# Patient Record
Sex: MALE | Race: WHITE
[De-identification: names, ages, dates, MRNs, and addresses within clinical notes are randomized per-mention and may not be internally consistent; named-entity substitution may affect disease eponyms.]

---

## 2017-01-12 ENCOUNTER — HOSPITAL ENCOUNTER (OUTPATIENT)
Dept: HOSPITAL 45 - C.LABSPEC | Age: 82
Discharge: HOME | End: 2017-01-12
Attending: INTERNAL MEDICINE
Payer: COMMERCIAL

## 2017-01-12 DIAGNOSIS — I48.91: Primary | ICD-10-CM

## 2017-01-12 DIAGNOSIS — Z91.81: ICD-10-CM

## 2017-01-12 DIAGNOSIS — Z79.01: ICD-10-CM

## 2017-01-12 DIAGNOSIS — M62.81: ICD-10-CM

## 2017-01-12 DIAGNOSIS — Z51.81: ICD-10-CM

## 2017-01-12 LAB
ANION GAP SERPL CALC-SCNC: 8 MMOL/L (ref 3–11)
BUN SERPL-MCNC: 25 MG/DL (ref 7–18)
BUN/CREAT SERPL: 16.8 (ref 10–20)
CALCIUM SERPL-MCNC: 9.4 MG/DL (ref 8.5–10.1)
CHLORIDE SERPL-SCNC: 104 MMOL/L (ref 98–107)
CO2 SERPL-SCNC: 28 MMOL/L (ref 21–32)
CREAT SERPL-MCNC: 1.5 MG/DL (ref 0.6–1.4)
GLUCOSE SERPL-MCNC: 100 MG/DL (ref 70–99)
POTASSIUM SERPL-SCNC: 4.6 MMOL/L (ref 3.5–5.1)
SODIUM SERPL-SCNC: 140 MMOL/L (ref 136–145)

## 2017-09-30 ENCOUNTER — HOSPITAL ENCOUNTER (INPATIENT)
Dept: HOSPITAL 45 - C.EDC | Age: 82
LOS: 6 days | Discharge: NURSING, REHAB OR LONG-TERM CARE | DRG: 494 | End: 2017-10-06
Attending: HOSPITALIST | Admitting: FAMILY MEDICINE
Payer: COMMERCIAL

## 2017-09-30 VITALS — OXYGEN SATURATION: 99 %

## 2017-09-30 VITALS
SYSTOLIC BLOOD PRESSURE: 112 MMHG | DIASTOLIC BLOOD PRESSURE: 59 MMHG | HEART RATE: 58 BPM | OXYGEN SATURATION: 94 % | TEMPERATURE: 97.7 F

## 2017-09-30 VITALS
HEIGHT: 71 IN | BODY MASS INDEX: 28.92 KG/M2 | WEIGHT: 206.57 LBS | BODY MASS INDEX: 28.92 KG/M2 | WEIGHT: 206.57 LBS | HEIGHT: 71 IN

## 2017-09-30 VITALS
HEART RATE: 59 BPM | DIASTOLIC BLOOD PRESSURE: 74 MMHG | OXYGEN SATURATION: 96 % | TEMPERATURE: 97.7 F | SYSTOLIC BLOOD PRESSURE: 124 MMHG

## 2017-09-30 DIAGNOSIS — Z79.899: ICD-10-CM

## 2017-09-30 DIAGNOSIS — Z79.82: ICD-10-CM

## 2017-09-30 DIAGNOSIS — I12.9: ICD-10-CM

## 2017-09-30 DIAGNOSIS — S93.04XA: ICD-10-CM
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
## 2017-09-30 DIAGNOSIS — T38.0X5A: ICD-10-CM

## 2017-09-30 DIAGNOSIS — I48.0: ICD-10-CM

## 2017-09-30 DIAGNOSIS — N18.3: ICD-10-CM

## 2017-09-30 DIAGNOSIS — E03.9: ICD-10-CM

## 2017-09-30 DIAGNOSIS — G25.0: ICD-10-CM

## 2017-09-30 DIAGNOSIS — Z79.01: ICD-10-CM

## 2017-09-30 DIAGNOSIS — S82.841A: Primary | ICD-10-CM

## 2017-09-30 DIAGNOSIS — S93.402A: ICD-10-CM

## 2017-09-30 DIAGNOSIS — T45.515A: ICD-10-CM

## 2017-09-30 DIAGNOSIS — D72.829: ICD-10-CM

## 2017-09-30 DIAGNOSIS — E78.5: ICD-10-CM

## 2017-09-30 DIAGNOSIS — R79.1: ICD-10-CM

## 2017-09-30 DIAGNOSIS — K21.9: ICD-10-CM

## 2017-09-30 DIAGNOSIS — R29.6: ICD-10-CM

## 2017-09-30 LAB
ALBUMIN/GLOB SERPL: 1 {RATIO} (ref 0.9–2)
ALP SERPL-CCNC: 83 U/L (ref 45–117)
ALT SERPL-CCNC: 27 U/L (ref 12–78)
ANION GAP SERPL CALC-SCNC: 8 MMOL/L (ref 3–11)
AST SERPL-CCNC: 18 U/L (ref 15–37)
BASOPHILS # BLD: 0 K/UL (ref 0–0.2)
BASOPHILS NFR BLD: 0 %
BUN SERPL-MCNC: 53 MG/DL (ref 7–18)
BUN/CREAT SERPL: 28.1 (ref 10–20)
CALCIUM SERPL-MCNC: 8.5 MG/DL (ref 8.5–10.1)
CHLORIDE SERPL-SCNC: 98 MMOL/L (ref 98–107)
CO2 SERPL-SCNC: 29 MMOL/L (ref 21–32)
COMPLETE: YES
CREAT CL PREDICTED SERPL C-G-VRATE: 32.6 ML/MIN
CREAT SERPL-MCNC: 1.9 MG/DL (ref 0.6–1.4)
EOSINOPHIL NFR BLD AUTO: 278 K/UL (ref 130–400)
GLOBULIN SER-MCNC: 3.2 GM/DL (ref 2.5–4)
GLUCOSE SERPL-MCNC: 115 MG/DL (ref 70–99)
HCT VFR BLD CALC: 42.7 % (ref 42–52)
IG%: 0.5 %
IMM GRANULOCYTES NFR BLD AUTO: 8.6 %
INR PPP: 4.4 (ref 0.9–1.1)
LYMPHOCYTES # BLD: 1.43 K/UL (ref 1.2–3.4)
MCH RBC QN AUTO: 28.1 PG (ref 25–34)
MCHC RBC AUTO-ENTMCNC: 31.4 G/DL (ref 32–36)
MCV RBC AUTO: 89.5 FL (ref 80–100)
MONOCYTES NFR BLD: 5.9 %
NEUTROPHILS # BLD AUTO: 0.4 %
NEUTROPHILS NFR BLD AUTO: 84.6 %
PMV BLD AUTO: 10.4 FL (ref 7.4–10.4)
POTASSIUM SERPL-SCNC: 3.6 MMOL/L (ref 3.5–5.1)
PROTHROMBIN TIME: 50.3 SECONDS (ref 9–12)
RBC # BLD AUTO: 4.77 M/UL (ref 4.7–6.1)
SODIUM SERPL-SCNC: 135 MMOL/L (ref 136–145)
WBC # BLD AUTO: 16.64 K/UL (ref 4.8–10.8)

## 2017-09-30 PROCEDURE — 0QSG04Z REPOSITION RIGHT TIBIA WITH INTERNAL FIXATION DEVICE, OPEN APPROACH: ICD-10-PCS | Performed by: ORTHOPAEDIC SURGERY

## 2017-09-30 PROCEDURE — 0SSFXZZ REPOSITION RIGHT ANKLE JOINT, EXTERNAL APPROACH: ICD-10-PCS

## 2017-09-30 PROCEDURE — 0QSJ04Z REPOSITION RIGHT FIBULA WITH INTERNAL FIXATION DEVICE, OPEN APPROACH: ICD-10-PCS | Performed by: ORTHOPAEDIC SURGERY

## 2017-09-30 RX ADMIN — TAMSULOSIN HYDROCHLORIDE SCH MG: 0.4 CAPSULE ORAL at 20:51

## 2017-09-30 RX ADMIN — TRAMADOL HYDROCHLORIDE PRN MG: 50 TABLET, COATED ORAL at 17:18

## 2017-09-30 NOTE — DIAGNOSTIC IMAGING REPORT
R ANKLE MIN 3 VIEWS ROUTINE



CLINICAL HISTORY: 86 years-old Male presenting with trauma. 



TECHNIQUE: Frontal, mortise, and lateral views of the right ankle were obtained.





COMPARISON:  None.



FINDINGS:

Diffuse soft tissue swelling noted around the ankle. Obliquely oriented fracture

of the fibula at the level of the syndesmosis which courses from the posterior

cortex proximally to the anterior cortex distally. Widening of the medial clear

space, which measures 6 to 7 mm. Widening of the anterior ankle joint, which

measures 16 mm. No gross evidence of a posterior malleolus fracture, although

presumed disruption of posterior ligaments.



IMPRESSION:

Brunner B fracture pattern with presumed disruption of posterior and medial

ligamentous structures. No fracture of the posterior malleolus or medial

malleolus.







Electronically signed by:  Santo Ochoa M.D.

9/30/2017 12:37 PM



Dictated Date/Time:  9/30/2017 12:34 PM

## 2017-09-30 NOTE — DIAGNOSTIC IMAGING REPORT
CERVICAL SPINE W/O



CLINICAL HISTORY: 86 years-old Male presenting with trauma, fell getting out of

bed. 



TECHNIQUE: Multidetector CT of the cervical spine was performed without the use

of intravenous contrast. IV contrast: None. A dose lowering technique was used

consistent with the principles of ALARA (as low as reasonably achievable).



COMPARISON: Plain radiographs of the cervical spine from 2016.



CT DOSE (mGy.cm): The estimated cumulative dose is 1221.96.



FINDINGS:



 topogram: Unremarkable.



Normal cervical lordosis. Mild vertebral body height loss noted at C4 and C5.

Significant intervertebral disc height loss at C5-6 and C6-7. Multilevel

degenerative changes with disc osteophyte complexes from C4-5 through C6-7.

Facet arthropathy also noted at multiple levels. No acute fracture or

malalignment. Skull base intact. Osseous neural foraminal narrowing noted at

C4-5 and to a lesser degree at C5-6 bilaterally. No osseous spinal canal

narrowing. Paraspinal soft tissues demonstrate the stimulator leads and

atherosclerosis but are otherwise normal. Lung apices with mild interlobular

septal prominence but essentially clear. Old calcified granuloma noted at the

right apex.



IMPRESSION:

1.  No acute osseous injury of the cervical spine.



2.  Multilevel degenerative changes as above. 







Electronically signed by:  Santo Ochoa M.D.

9/30/2017 12:32 PM



Dictated Date/Time:  9/30/2017 12:23 PM

## 2017-09-30 NOTE — DIAGNOSTIC IMAGING REPORT
PELVIS 1 OR 2 VIEW ROUTINE



CLINICAL HISTORY: 86 years-old Male presenting with trauma, distracting injury. 



TECHNIQUE: Single frontal view of the pelvis was obtained. 



COMPARISON:  None.



FINDINGS:

Bony pelvis intact. Pubic symphysis and sacroiliac joints congruent. Hip joints

congruent. No gross evidence of femoral neck fracture. Unremarkable lower lumbar

spine. Nonobstructive bowel gas pattern.



IMPRESSION:

No acute osseous injury of the pelvis.







Electronically signed by:  Santo Ochoa M.D.

9/30/2017 12:39 PM



Dictated Date/Time:  9/30/2017 12:37 PM

## 2017-09-30 NOTE — DIAGNOSTIC IMAGING REPORT
CHEST ONE VIEW PORTABLE



CLINICAL HISTORY: 86 years-old Male presenting with trauma. 



TECHNIQUE: Portable upright AP view of the chest was obtained.



COMPARISON:  12/17/2016.



FINDINGS:

Bilateral implanted devices with stimulator leads coursing to the neck and

cranium. Atherosclerosis of aortic arch. Cardiac silhouette top normal in size.

Persistent elevation of the right hemidiaphragm. Minimal bibasilar opacities

similar to prior exam. No large effusion or pneumothorax. No new focal

infiltrate. Degenerative changes of the thoracic spine. Upper abdomen normal.



IMPRESSION:

1.  Bibasilar opacities likely atelectasis or scarring. This is unchanged prior.

No new focal infiltrate to suggest acute cardiopulmonary disease.







Electronically signed by:  Santo Ochoa M.D.

9/30/2017 12:34 PM



Dictated Date/Time:  9/30/2017 12:32 PM

## 2017-09-30 NOTE — PROGRESS NOTE
Progress Note


Date of Service


Sep 30, 2017.





Progress Note


Spoke with Dr. Nice, orthopedic surgery; recommendation is made for 

surgery on the R ankle in the AM (9/30). Only concern is the INR is 4.4. I 

spoke with Dr. Miller, who recommends giving IV Vitamin K 10 - and recheck INR 

in AM. No other contraindications for surgery. Last echo noted in December 2016

; currently in NSR.

## 2017-09-30 NOTE — HISTORY AND PHYSICAL
History & Physical


Date & Time of Service:


Sep 30, 2017 at 14:06


Chief Complaint:


Fall/R-Ankle Pain


Primary Care Physician:


Dilan Rucker MD


History of Present Illness


Source:  patient, family, clinic records, hospital records


This is an 86 year old male with a PMH of paroxysmal atrial fibrillation on 

Coumadin, HTN, hypothyroidism, CKD stage 3, essential tremor, with a recent 

injury of the L fibula; L lateral malleolar area - he was at home with non-

weight bearing status; using prednisone for this. This morning, patient states 

he was trying to get out of the bed and he fell. He states his R ankle was very 

painful and swollen. He presented to he ER and found to have fracture and 

dislocation of the R ankle. This was reduced and repeat X-ray suggests a 

posterior malleolar fracture. He states that the pain is much improved after 

morphine and fentanyl. He states he feels better now. Denies chest pain/

shortness of breath. Denies fevers/chills.





States he had some diarrhea with the Keflex that he was prescribed as an 

outpatient.





Past Medical/Surgical History


Medical Problems:


(1) Carotid artery surgery


Status: Resolved  





(2) Gastroesophageal reflux disease


Status: Chronic  





(3) Hyperlipidemia


Status: Chronic  





(4) Hypothyroidism


Status: Chronic  





(5) Placement of deep brain stimulators


Status: Resolved  











Social History


Smoking Status:  Never Smoker


Marital Status:  


Housing status:  lives with family


Occupational Status:  retired





Immunizations


History of Influenza Vaccine:  Yes


History of Tetanus Vaccine?:  No


History of Pneumococcal:  Yes


History of Hepatitis B Vaccine:  No





Multi-Drug Resistant Organisms


History of MDRO:  No





Allergies


Coded Allergies:  


     Finasteride (Unverified  Adverse Reaction, Severe, RASH, 9/30/17)





Home Medications


Scheduled


Amiodarone Hcl (Cordarone), 200 MG PO DAILY


Aspirin (Aspirin EC Low Dose), 81 MG PO DAILY


Atorvastatin (Lipitor), 10 MG PO DAILY


Cephalexin Monohydrate (Keflex), 500 MG PO TID


Levothyroxine Sodium (Synthroid), 75 MCG PO DAILY


Omeprazole (Prilosec), 40 MG PO DAILYBB


Prednisone (Prednisone), 10 MG PO DAILY


Probiotic Product (Probiotic), 1 CAP PO DAILY


Tamsulosin Hcl (Flomax), 0.4 MG PO HS


Warfarin Sod (Jantoven), 4.5 MG PO UD





Scheduled PRN


Polyethylene (Miralax), 17 GM PO DAILY PRN for Constipation


Tramadol (Ultram), 25 MG PO Q6H PRN for Pain





Review of Systems


Constitutional:  No fever, No chills


ENT:  No hearing loss


Respiratory:  No cough, No sputum, No wheezing, No shortness of breath, No 

dyspnea on exertion, No dyspnea at rest, No hemoptysis


Cardiovascular:  No chest pain, No palpitations


Abdomen:  + diarrhea, No pain, No nausea, No vomiting, No constipation, No GI 

bleeding


Musculoskeletal:  + joint pain (R ankle), + swelling, No muscle pain, No calf 

pain


Genitourinary - Male:  No hematuria, No dysuria


Neurologic:  + balance problems, No memory loss, No paralysis, No weakness


Psychiatric:  No depression symptoms


Hematologic / Lymphatic:  No abnormal bleeding/bruising


Integumentary:  No rash


Allergic / Immunologic:  No environmental allergies, No seasonal allergies





Physical Exam


Vital Signs











  Date Time  Temp Pulse Resp B/P (MAP) Pulse Ox O2 Delivery O2 Flow Rate FiO2


 


9/30/17 13:00  53 14 127/81 99 Room Air  


 


9/30/17 12:22  53      


 


9/30/17 11:57  50  106/56 93 Room Air  


 


9/30/17 11:09 36.4 92 17 106/62 98 Room Air  








General Appearance:  no apparent distress, + pertinent finding (+resting tremor)


Head:  normocephalic, atraumatic


Eyes:  normal inspection


ENT:  hearing grossly normal


Respiratory/Chest:  chest non-tender, lungs clear, normal breath sounds, no 

respiratory distress, no accessory muscle use


Cardiovascular:  regular rate, rhythm, no edema, no gallop, no JVD, no murmur, 

normal peripheral pulses


Abdomen/GI:  normal bowel sounds, non tender, soft


Back:  no muscle spasm


Extremities/Musculoskelatal:  + pertinent finding (R ankle is wrapped currently

, painful to any movement; L ankle is doing better, but painful with ROM)


Neurologic/Psych:  CNs II-XII nml as tested, no motor/sensory deficits, alert, 

normal mood/affect, oriented x 3, + pertinent finding (+tremor)


Skin:  normal color


Lymphatic:  no adenopathy





Diagnostics


Laboratory Results





Results Past 24 Hours








Test


  9/30/17


11:34 Range/Units


 


 


White Blood Count 16.64 4.8-10.8  K/uL


 


Red Blood Count 4.77 4.7-6.1  M/uL


 


Hemoglobin 13.4 14.0-18.0  g/dL


 


Hematocrit 42.7 42-52  %


 


Mean Corpuscular Volume 89.5   fL


 


Mean Corpuscular Hemoglobin 28.1 25-34  pg


 


Mean Corpuscular Hemoglobin


Concent 31.4


  32-36  g/dl


 


 


Platelet Count 278 130-400  K/uL


 


Mean Platelet Volume 10.4 7.4-10.4  fL


 


Neutrophils (%) (Auto) 84.6  %


 


Lymphocytes (%) (Auto) 8.6  %


 


Monocytes (%) (Auto) 5.9  %


 


Eosinophils (%) (Auto) 0.4  %


 


Basophils (%) (Auto) 0.0  %


 


Neutrophils # (Auto) 14.07 1.4-6.5  K/uL


 


Lymphocytes # (Auto) 1.43 1.2-3.4  K/uL


 


Monocytes # (Auto) 0.99 0.11-0.59  K/uL


 


Eosinophils # (Auto) 0.06 0-0.5  K/uL


 


Basophils # (Auto) 0.00 0-0.2  K/uL


 


RDW Standard Deviation 53.1 36.4-46.3  fL


 


RDW Coefficient of Variation 16.2 11.5-14.5  %


 


Immature Granulocyte % (Auto) 0.5  %


 


Immature Granulocyte # (Auto) 0.09 0.00-0.02  K/uL


 


Prothrombin Time


  50.3


  9.0-12.0


SECONDS


 


Prothromb Time International


Ratio 4.4


  0.9-1.1  


 


 


Sodium Level 135 136-145  mmol/L


 


Potassium Level 3.6 3.5-5.1  mmol/L


 


Chloride Level 98   mmol/L


 


Carbon Dioxide Level 29 21-32  mmol/L


 


Anion Gap 8.0 3-11  mmol/L


 


Blood Urea Nitrogen 53 7-18  mg/dl


 


Creatinine


  1.90


  0.60-1.40


mg/dl


 


Est Creatinine Clear Calc


Drug Dose 32.6


   ml/min


 


 


Estimated GFR (


American) 36.2


   


 


 


Estimated GFR (Non-


American 31.2


   


 


 


BUN/Creatinine Ratio 28.1 10-20  


 


Random Glucose 115 70-99  mg/dl


 


Calcium Level 8.5 8.5-10.1  mg/dl


 


Total Bilirubin 0.7 0.2-1  mg/dl


 


Aspartate Amino Transf


(AST/SGOT) 18


  15-37  U/L


 


 


Alanine Aminotransferase


(ALT/SGPT) 27


  12-78  U/L


 


 


Alkaline Phosphatase 83   U/L


 


Total Protein 6.4 6.4-8.2  gm/dl


 


Albumin 3.1 3.4-5.0  gm/dl


 


Globulin 3.2 2.5-4.0  gm/dl


 


Albumin/Globulin Ratio 1.0 0.9-2  











Diagnostic Radiology


R ANKLE MIN 3 VIEWS ROUTINE





CLINICAL HISTORY: 86 years-old Male presenting with post reduction. 





TECHNIQUE: Frontal, oblique, and lateral views of the right ankle were obtained.








COMPARISON:  Plain radiographs of the right ankle performed earlier the same


day.





FINDINGS:


There has been interval reduction of the of the ankle mortise. The medial clear


space now measures 5 mm, decreased from 7 mm. No significant diastases of the


anterior aspect of the ankle mortise. Again demonstrated is the obliquely


oriented fracture of the fibula at the level of the syndesmosis. There is a


cortical discontinuity of the posterior aspect of the tibial plafond suggesting


posterior malleolar fracture. An overlying fiberglass splint partially tears


underlying osseous detail.





IMPRESSION:


Suggestion of posterior malleolus fracture, which is nondisplaced. Interval


reduction of the ankle mortise dislocation. Unchanged Brunner B fracture of the


fibula.








HEAD WITHOUT CONTRAST (CT)





CLINICAL HISTORY: 86 years-old Male presenting with trauma, fell when getting


out of of bed. 





TECHNIQUE: Multidetector CT imaging of the head was performed without the use of


intravenous contrast. IV contrast: None. A dose lowering technique was used


consistent with the principles of ALARA (as low as reasonably achievable). 





COMPARISON:  4/27/2009.





CT DOSE (mGy.cm): The estimated cumulative dose is 1221.96 mGy.cm.





FINDINGS: 





 topogram: 2 stimulator leads noted in the cranium.





Proportional ventricular and sulcal prominence, likely age-related parenchymal


volume loss. Prominence of CSF space along the left posterior fossa may indicate


an arachnoid cyst. Periventricular and subcortical white matter hypoattenuation,


nonspecific but likely indicative of chronic small vessel ischemic change. 2


transfrontal deep brain stimulator leads terminate in the thalami. No mass


effect or midline shift. No hemorrhage or acute territorial infarct. No


extra-axial fluid collection. Paranasal sinuses and mastoid air cells clear.


Calvarium intact other than the rupa holes for the stimulator leads.    





IMPRESSION:


1.  No acute intracranial pathology. 





2.  Postoperative changes stable from prior exam.





PELVIS 1 OR 2 VIEW ROUTINE





CLINICAL HISTORY: 86 years-old Male presenting with trauma, distracting injury. 





TECHNIQUE: Single frontal view of the pelvis was obtained. 





COMPARISON:  None.





FINDINGS:


Bony pelvis intact. Pubic symphysis and sacroiliac joints congruent. Hip joints


congruent. No gross evidence of femoral neck fracture. Unremarkable lower lumbar


spine. Nonobstructive bowel gas pattern.





IMPRESSION:


No acute osseous injury of the pelvis.





CERVICAL SPINE W/O





CLINICAL HISTORY: 86 years-old Male presenting with trauma, fell getting out of


bed. 





TECHNIQUE: Multidetector CT of the cervical spine was performed without the use


of intravenous contrast. IV contrast: None. A dose lowering technique was used


consistent with the principles of ALARA (as low as reasonably achievable).





COMPARISON: Plain radiographs of the cervical spine from 2016.





CT DOSE (mGy.cm): The estimated cumulative dose is 1221.96.





FINDINGS:





 topogram: Unremarkable.





Normal cervical lordosis. Mild vertebral body height loss noted at C4 and C5.


Significant intervertebral disc height loss at C5-6 and C6-7. Multilevel


degenerative changes with disc osteophyte complexes from C4-5 through C6-7.


Facet arthropathy also noted at multiple levels. No acute fracture or


malalignment. Skull base intact. Osseous neural foraminal narrowing noted at


C4-5 and to a lesser degree at C5-6 bilaterally. No osseous spinal canal


narrowing. Paraspinal soft tissues demonstrate the stimulator leads and


atherosclerosis but are otherwise normal. Lung apices with mild interlobular


septal prominence but essentially clear. Old calcified granuloma noted at the


right apex.





IMPRESSION:


1.  No acute osseous injury of the cervical spine.





2.  Multilevel degenerative changes as above.





EKG


Sinus Bradycardia at 58 bpm





Impression


Assessment and Plan


This is an 86 year old male with a PMH of paroxysmal atrial fibrillation on 

Coumadin, HTN, hypothyroidism, CKD stage 3, essential tremor, with mechanical 

fall and R posterior malleolar fracture





R Posterior Malleolar Fracture


L Ankle Pain


patient presented status post mechanical fall


fracture R posterior malleolar fracture


this was also dislocated and subsequently reduced by the ER physician


orthopedic consultation placed


currently on bedrest


morphine and tramadol PRN





Paroxysmal Atrial Fibrillation


currently in NSR


continue Amiodarone


hold Coumadin, due to elevated INR





Leukocytosis


secondary to prednisone use


hold steroids, and hold abx. - no sign of infection currently





Hypothyroidism


continue Synthroid





CKD stage 3


avoid nephrotoxic agents when able


gentle hydration





DVT ppx


Coumadin





FULL CODE





VTE Prophylaxis


VTE Risk Assessment Done? Y/N:  Yes


Risk Level:  High


Given or contraindicated:  Warfarin (Coumadin)

## 2017-09-30 NOTE — DIAGNOSTIC IMAGING REPORT
HEAD WITHOUT CONTRAST (CT)



CLINICAL HISTORY: 86 years-old Male presenting with trauma, fell when getting

out of of bed. 



TECHNIQUE: Multidetector CT imaging of the head was performed without the use of

intravenous contrast. IV contrast: None. A dose lowering technique was used

consistent with the principles of ALARA (as low as reasonably achievable). 



COMPARISON:  4/27/2009.



CT DOSE (mGy.cm): The estimated cumulative dose is 1221.96 mGy.cm.



FINDINGS: 



 topogram: 2 stimulator leads noted in the cranium.



Proportional ventricular and sulcal prominence, likely age-related parenchymal

volume loss. Prominence of CSF space along the left posterior fossa may indicate

an arachnoid cyst. Periventricular and subcortical white matter hypoattenuation,

nonspecific but likely indicative of chronic small vessel ischemic change. 2

transfrontal deep brain stimulator leads terminate in the thalami. No mass

effect or midline shift. No hemorrhage or acute territorial infarct. No

extra-axial fluid collection. Paranasal sinuses and mastoid air cells clear.

Calvarium intact other than the rupa holes for the stimulator leads.    



IMPRESSION:

1.  No acute intracranial pathology. 



2.  Postoperative changes stable from prior exam.







Electronically signed by:  Santo Ochoa M.D.

9/30/2017 12:19 PM



Dictated Date/Time:  9/30/2017 12:16 PM

## 2017-09-30 NOTE — EMERGENCY ROOM VISIT NOTE
History


Report prepared by Lynn:  Violte Sommers


Under the Supervision of:  ANTHONY GriffithO.


First contact with patient:  11:04


Stated Complaint:  FALL/R-ANKLE PAIN





History of Present Illness


The patient is a 86 year old male who presents to the Emergency Room with 

complaints of constant right ankle pain beginning just PTA. The patient states 

that he got up this morning to use the urinal and fell. He reports that he hit 

his head and hurt his right ankle. He notes that he is not sure how he fell and 

states that he is on Coumadin. The patient denies any LOC, shortness of breath, 

nausea, back pain, abdominal pain, shoulder pain, and hip pain. He notes that 

he had an episode of a fall last week and has been in a wheelchair since then 

with left leg pain.





   Source of History:  patient


   Onset:  just PTA


   Position:  ankle (right)


   Timing:  constant


   Associated Symptoms:  No LOC, No SOB, No nausea, No abdominal pain, No back 

pain


Note:


Pt complains of head injury. The patient denies any shoulder pain, and hip pain.





Review of Systems


See HPI for pertinent positives & negatives. A total of 10 systems reviewed and 

were otherwise negative.





Past Medical & Surgical


Medical Problems:


(1) Atrial fibrillation with RVR


(2) Carotid artery surgery


(3) Gastroesophageal reflux disease


(4) Hyperlipidemia


(5) Hypotension


(6) Hypothyroidism


(7) Placement of deep brain stimulators








Family History


No pertinent family history stated.





Social History


Smoking Status:  Never Smoker


Alcohol Use:  none


Marital Status:  


Housing Status:  lives with family


Occupation Status:  retired





Current/Historical Medications


Scheduled


Amiodarone Hcl (Cordarone), 200 MG PO DAILY


Aspirin (Aspirin EC Low Dose), 81 MG PO DAILY


Atorvastatin (Lipitor), 10 MG PO DAILY


Cephalexin Monohydrate (Keflex), 500 MG PO TID


Levothyroxine Sodium (Synthroid), 75 MCG PO DAILY


Omeprazole (Prilosec), 40 MG PO DAILYBB


Prednisone (Prednisone), 10 MG PO DAILY


Probiotic Product (Probiotic), 1 CAP PO DAILY


Tamsulosin Hcl (Flomax), 0.4 MG PO HS


Warfarin Sod (Jantoven), 4.5 MG PO UD





Scheduled PRN


Polyethylene (Miralax), 17 GM PO DAILY PRN for Constipation


Tramadol (Ultram), 25 MG PO Q6H PRN for Pain





Allergies


Coded Allergies:  


     Finasteride (Unverified  Adverse Reaction, Severe, RASH, 9/30/17)





Physical Exam


Vital Signs











  Date Time  Temp Pulse Resp B/P (MAP) Pulse Ox O2 Delivery O2 Flow Rate FiO2


 


9/30/17 13:00  53 14 127/81 99 Room Air  


 


9/30/17 12:22  53      


 


9/30/17 11:57  50  106/56 93 Room Air  


 


9/30/17 11:09 36.4 92 17 106/62 98 Room Air  











Physical Exam


GENERAL: alert, well appearing, well nourished, no distress, non-toxic 


EYE EXAM: normal conjunctiva, PERRL and EOM's grossly intact


OROPHARYNX: no exudate, no erythema, lips, buccal mucosa, and tongue normal and 

mucous membranes are moist


NECK: supple, no nuchal rigidity, no adenopathy, non-tender


LUNGS: Clear to auscultation. Normal chest wall mechanics


CHEST: No chest wall tenderness. 


HEART: no murmurs, S1 normal and S2 normal 


ABDOMEN: abdomen soft, non-tender, normo-active bowel sounds, no masses, no 

rebound or guarding. 


BACK: Back is symmetrical on inspection and there is no deformity, no midline 

tenderness, no CVA tenderness. 


SKIN: no rashes and no bruising 


UPPER EXTREMITIES: upper extremities are grossly normal. 


LOWER EXTREMITIES: Obvious deformity and swelling to the right ankle. Normal 

pulses, normal sensorium, decreased range of motion of the right foot secondary 

to pain. No other pain to palpation to hips, arms, knees.


NEURO EXAM: Normal sensorium, cranial nerves II-XII grossly intact, normal 

speech,  no gross weakness of arms, no gross weakness of legs.





Medical Decision & Procedures


ER Provider


Diagnostic Interpretation:


Radiology results have been interpreted by the radiologist and reviewed by me.





PELVIS 1 OR 2 VIEW ROUTINE





FINDINGS:


Bony pelvis intact. Pubic symphysis and sacroiliac joints congruent. Hip joints


congruent. No gross evidence of femoral neck fracture. Unremarkable lower lumbar


spine. Nonobstructive bowel gas pattern.





IMPRESSION:


No acute osseous injury of the pelvis.





Electronically signed by:  Santo Ochoa M.D.


9/30/2017 12:39 PM





Dictated Date/Time:  9/30/2017 12:37 PM





HEAD WITHOUT CONTRAST (CT)





FINDINGS: 





 topogram: 2 stimulator leads noted in the cranium.





Proportional ventricular and sulcal prominence, likely age-related parenchymal


volume loss. Prominence of CSF space along the left posterior fossa may indicate


an arachnoid cyst. Periventricular and subcortical white matter hypoattenuation,


nonspecific but likely indicative of chronic small vessel ischemic change. 2


transfrontal deep brain stimulator leads terminate in the thalami. No mass


effect or midline shift. No hemorrhage or acute territorial infarct. No


extra-axial fluid collection. Paranasal sinuses and mastoid air cells clear.


Calvarium intact other than the rupa holes for the stimulator leads.    





IMPRESSION:


1.  No acute intracranial pathology. 





2.  Postoperative changes stable from prior exam.





Electronically signed by:  Santo Ochoa M.D.


9/30/2017 12:19 PM





Dictated Date/Time:  9/30/2017 12:16 PM





CHEST ONE VIEW PORTABLE





FINDINGS:


Bilateral implanted devices with stimulator leads coursing to the neck and


cranium. Atherosclerosis of aortic arch. Cardiac silhouette top normal in size.


Persistent elevation of the right hemidiaphragm. Minimal bibasilar opacities


similar to prior exam. No large effusion or pneumothorax. No new focal


infiltrate. Degenerative changes of the thoracic spine. Upper abdomen normal.





IMPRESSION:


1.  Bibasilar opacities likely atelectasis or scarring. This is unchanged prior.


No new focal infiltrate to suggest acute cardiopulmonary disease.





Electronically signed by:  Santo Ochoa M.D.


9/30/2017 12:34 PM





Dictated Date/Time:  9/30/2017 12:32 PM





CERVICAL SPINE W/O





FINDINGS:





 topogram: Unremarkable.





Normal cervical lordosis. Mild vertebral body height loss noted at C4 and C5.


Significant intervertebral disc height loss at C5-6 and C6-7. Multilevel


degenerative changes with disc osteophyte complexes from C4-5 through C6-7.


Facet arthropathy also noted at multiple levels. No acute fracture or


malalignment. Skull base intact. Osseous neural foraminal narrowing noted at


C4-5 and to a lesser degree at C5-6 bilaterally. No osseous spinal canal


narrowing. Paraspinal soft tissues demonstrate the stimulator leads and


atherosclerosis but are otherwise normal. Lung apices with mild interlobular


septal prominence but essentially clear. Old calcified granuloma noted at the


right apex.





IMPRESSION:


1.  No acute osseous injury of the cervical spine.





2.  Multilevel degenerative changes as above. 





Electronically signed by:  Santo Ochoa M.D.


9/30/2017 12:32 PM





Dictated Date/Time:  9/30/2017 12:23 PM





R ANKLE MIN 3 VIEWS ROUTINE





COMPARISON:  None.





FINDINGS:


Diffuse soft tissue swelling noted around the ankle. Obliquely oriented fracture


of the fibula at the level of the syndesmosis which courses from the posterior


cortex proximally to the anterior cortex distally. Widening of the medial clear


space, which measures 6 to 7 mm. Widening of the anterior ankle joint, which


measures 16 mm. No gross evidence of a posterior malleolus fracture, although


presumed disruption of posterior ligaments.





IMPRESSION:


Brunner B fracture pattern with presumed disruption of posterior and medial


ligamentous structures. No fracture of the posterior malleolus or medial


malleolus.





Electronically signed by:  Santo Ochoa M.D.


9/30/2017 12:37 PM





Dictated Date/Time:  9/30/2017 12:34 PM





Laboratory Results











Test


  9/30/17


11:34


 


Immature Granulocyte % (Auto) 0.5 % 


 


White Blood Count


  16.64 K/uL


(4.8-10.8)


 


Red Blood Count


  4.77 M/uL


(4.7-6.1)


 


Hemoglobin


  13.4 g/dL


(14.0-18.0)


 


Hematocrit 42.7 % (42-52) 


 


Mean Corpuscular Volume


  89.5 fL


()


 


Mean Corpuscular Hemoglobin


  28.1 pg


(25-34)


 


Mean Corpuscular Hemoglobin


Concent 31.4 g/dl


(32-36)


 


Platelet Count


  278 K/uL


(130-400)


 


Mean Platelet Volume


  10.4 fL


(7.4-10.4)


 


Neutrophils (%) (Auto) 84.6 % 


 


Lymphocytes (%) (Auto) 8.6 % 


 


Monocytes (%) (Auto) 5.9 % 


 


Eosinophils (%) (Auto) 0.4 % 


 


Basophils (%) (Auto) 0.0 % 


 


Neutrophils # (Auto)


  14.07 K/uL


(1.4-6.5)


 


Lymphocytes # (Auto)


  1.43 K/uL


(1.2-3.4)


 


Monocytes # (Auto)


  0.99 K/uL


(0.11-0.59)


 


Eosinophils # (Auto)


  0.06 K/uL


(0-0.5)


 


Basophils # (Auto)


  0.00 K/uL


(0-0.2)


 


Immature Granulocyte # (Auto)


  0.09 K/uL


(0.00-0.02)


 


Total Bilirubin


  0.7 mg/dl


(0.2-1)


 


Aspartate Amino Transf


(AST/SGOT) 18 U/L (15-37) 


 


 


Alanine Aminotransferase


(ALT/SGPT) 27 U/L (12-78) 


 


 


Alkaline Phosphatase


  83 U/L


()


 


Total Protein


  6.4 gm/dl


(6.4-8.2)


 


Albumin


  3.1 gm/dl


(3.4-5.0)


 


Globulin


  3.2 gm/dl


(2.5-4.0)


 


Albumin/Globulin Ratio 1.0 (0.9-2) 





Laboratory results per my review.





Medications Administered











 Medications


  (Trade)  Dose


 Ordered  Sig/Deanne


 Route  Start Time


 Stop Time Status Last Admin


Dose Admin


 


 Morphine Sulfate


  (MoRPHine


 SULFATE INJ)  4 mg  NOW  STAT


 IV  9/30/17 12:01


 9/30/17 12:02 DC 9/30/17 12:13


4 MG


 


 Fentanyl Citrate


  (Fentanyl Inj)  50 mcg  NOW  STAT


 IV  9/30/17 12:40


 9/30/17 12:41 DC 9/30/17 12:46


50 MCG











Procedure


Total reduction of the patient's subluxation at the right ankle was performed 

with distraction after administration of additional 50 g of fentanyl and 

posterior splint applied.  Patient had no pulse and sensation throughout.  

Repeat exam showed improved alignment of the prior subluxation.  Fractures 

again noted.  Hospitalist aware and will place with a consult.





ECG


Indication:  other (fall)


Rate (beats per minute):  50


Rhythm:  sinus bradycardia


Findings:  no acute ischemic change, left axis deviation, other (normal 

intervals)





ED Course


1104: The patient was evaluated in room C4. A complete history and physical 

exam was performed. 





1201: Morphine Sulfate 4mg IV. 





1229: I reevaluated and updated the patient. He is feeling more comfortable 

after the pain medication. 





1240: Fentanyl Inj 50mcg IV. 





1301: I reviewed the patient's case with Dr. Quiroga of [facility].  He will 

evaluate the patient for further management.





1314: Upon reevaluation, the patient is doing well. I discussed the findings 

and the treatment plan with the patient.  He expresses agreement and 

understanding.  I spoke with Dr. Quiroga of the [facility] Hospitalist Service.  

He will be evaluated for further management.





Medical Decision


Differential diagnosis:


Etiologies such as fracture, dislocation, neurovascular compromise, compartment 

syndrome, soft tissue injury, as well as others were entertained. 





Patient with prior left fibular fracture and nonweightbearing, now sustaining a 

new fall with a new fracture on the right.  Patient with a supratherapeutic INR 

on Coumadin for A. fib.  No other trauma noted and no other acute traumatic 

injuries found.  Patient admitted for management and eventual placement, 

orthopedic consult, monitoring of his INR and other labs.  Patient and family 

aware.





Medication Reconcilliation


Current Medication List:  was personally reviewed by me





Blood Pressure Screening


Patient's blood pressure:  Normal blood pressure


Blood pressure disposition:  Did not require urgent referral





Consults


Time Called:  1256


Consulting Physician:  Dr. Junaid Schaefer


Returned Call:  1301


I reviewed the patient's case with Dr. Quiroga of [facility].  He will evaluate 

the patient for further management.





Impression





 Primary Impression:  


 Fall


 Additional Impressions:  


 Fibula fracture


 Ankle dislocation


 Supratherapeutic INR





Scribe Attestation


The scribe's documentation has been prepared under my direction and personally 

reviewed by me in its entirety. I confirm that the note above accurately 

reflects all work, treatment, procedures, and medical decision making performed 

by me.





Departure Information


Dispostion


Being Evaluated By Hospitalist





Referrals


No Doctor, Assigned (PCP)





Problem Qualifiers








 Primary Impression:  


 Fall


 Encounter type:  initial encounter  Qualified Codes:  W19.XXXA - Unspecified 

fall, initial encounter


 Additional Impressions:  


 Fibula fracture


 Encounter type:  initial encounter  Fibula location:  distal  Fracture type:  

closed  Fracture morphology:  unspecified fracture morphology  Laterality:  

right  Qualified Codes:  S82.831A - Other fracture of upper and lower end of 

right fibula, initial encounter for closed fracture


 Ankle dislocation


 Encounter type:  initial encounter  Laterality:  right  Qualified Codes:  

S93.04XA - Dislocation of right ankle joint, initial encounter

## 2017-09-30 NOTE — HISTORY AND PHYSICAL
History & Physical


Date


Sep 30, 2017.





Chief Complaint


Right ankle pain





History of Present Illness


The patient is a 86 year old male with complaints of pain in his bilateral 

ankles.  He sustained 3 falls in the last 2 weeks.  He says there are more like 

collapsing.  He had x-rays done of his left ankle at [facility] which showed a 

nondisplaced avulsion fracture per report.  We don't have those x-rays here to 

review.  However he was having trouble getting around in his left ankle.  Than 

this morning getting out of bed he still fell and injured his right ankle.  He 

is brought to the emergency room where x-rays revealed a posterior fracture 

dislocation of the ankle.  The fracture was reduced and splinted and he was 

admitted to the floor by Dr. Quiroga.  Orthopedics was consult for management 

recommendations.





Mr. Francis states that the pain is throughout the ankle.  He denies any numbness 

or tingling.  He lives with his spouse independently in a house.  His 2 

daughters are with him today.





Past Medical/Surgical History


Medical Problems:


(1) Atrial fibrillation with RVR


(2) Carotid artery surgery


(3) Gastroesophageal reflux disease


(4) Hyperlipidemia


(5) Hypotension


(6) Hypothyroidism


(7) Placement of deep brain stimulators








Allergies


Coded Allergies:  


     Finasteride (Unverified  Adverse Reaction, Severe, RASH, 9/30/17)





Home Medications


Scheduled


Amiodarone Hcl (Cordarone), 200 MG PO DAILY


Aspirin (Aspirin EC Low Dose), 81 MG PO DAILY


Atorvastatin (Lipitor), 10 MG PO DAILY


Cephalexin Monohydrate (Keflex), 500 MG PO TID


Levothyroxine Sodium (Synthroid), 75 MCG PO DAILY


Omeprazole (Prilosec), 40 MG PO DAILYBB


Prednisone (Prednisone), 10 MG PO DAILY


Probiotic Product (Probiotic), 1 CAP PO DAILY


Tamsulosin Hcl (Flomax), 0.4 MG PO HS


Warfarin Sod (Jantoven), 4.5 MG PO UD





Scheduled PRN


Polyethylene (Miralax), 17 GM PO DAILY PRN for Constipation


Tramadol (Ultram), 25 MG PO Q6H PRN for Pain





Physical Examination


Extremities:  + pertinent finding (examination of bilateral ankles reveals his 

right ankle to be in a splint.  He is able to wiggle his toes.  He is sensory 

intact to light touch over the toes.  The splint was not taken down since he 

had a fracture dislocation.  Examination of the left ankle reveals bruising 

over the distal fibula with tenderness over the ATFL CFL and the lateral 

malleolus.  He is able to fire EHL FHL tib and gastrocsoleus E has pain with 

resisted strength testing of the left ankle.  He is sensory intact to light 

touch throughout.The patient also has a central tremor of his bilateral upper 

extremities.)


Neurologic/Psych:  no motor/sensory deficits, alert


Addiitonal Comments:


Results reviewed right ankle x-rays done today pre-and post reduction show a 

distal fibula fracture with posterior displacement as well as a posterior 

malleolus fracture with posterior dislocation of the talus on the tibia.  Post 

reduction films demonstrate the ankle to be relocated in the joint.





Diagnosis


Right ankle flat fracture dislocation and subacute left ankle fracture 

reportedly nondisplaced avulsion fracture.  These are in the setting of a 

patient with history of 3 falls in the last 2 weeks who lives with his wife.





Plan of Treatment


I discussed the diagnoses with the patient and his 2 daughters.  I recommend 

surgical intervention for the right ankle fracture to improve the alignment and 

reduce his risk of posttraumatic arthritis as well as optimize his function.  

However there are risks with surgery including but not limited to anesthetic 

related risks, bleeding particularly in light of the fact that he is on 

Coumadin with an INR for today.  Other risks include infection and wound 

complications numbness stiffness and continued pain.  Postoperatively his can 

be a challenge mobilizing him because of his broken left ankle.  His skin is 

likely need to be in a wheelchair for at least a couple weeks afterwards.  For 

this reason he is going to need to go to a nursing facility once he leaves the 

hospital.  He has received IV vitamin K to reverse his INR.  It's going to be 

checked in the morning.  He is then placed on the surgery schedule for 9:30.  I 

will allow him to eat tonight and then be nothing by mouth after midnight.  

Informed consent was signed.  His surgical site was marked.

## 2017-10-01 VITALS
SYSTOLIC BLOOD PRESSURE: 111 MMHG | DIASTOLIC BLOOD PRESSURE: 66 MMHG | OXYGEN SATURATION: 99 % | HEART RATE: 69 BPM | TEMPERATURE: 97.7 F

## 2017-10-01 VITALS
DIASTOLIC BLOOD PRESSURE: 71 MMHG | HEART RATE: 66 BPM | SYSTOLIC BLOOD PRESSURE: 118 MMHG | OXYGEN SATURATION: 100 % | TEMPERATURE: 97.7 F

## 2017-10-01 VITALS
DIASTOLIC BLOOD PRESSURE: 71 MMHG | SYSTOLIC BLOOD PRESSURE: 117 MMHG | OXYGEN SATURATION: 100 % | HEART RATE: 66 BPM | TEMPERATURE: 97.16 F

## 2017-10-01 VITALS
SYSTOLIC BLOOD PRESSURE: 111 MMHG | OXYGEN SATURATION: 99 % | TEMPERATURE: 97.34 F | HEART RATE: 69 BPM | DIASTOLIC BLOOD PRESSURE: 70 MMHG

## 2017-10-01 VITALS
OXYGEN SATURATION: 100 % | DIASTOLIC BLOOD PRESSURE: 62 MMHG | SYSTOLIC BLOOD PRESSURE: 118 MMHG | HEART RATE: 67 BPM | TEMPERATURE: 97.34 F

## 2017-10-01 VITALS
DIASTOLIC BLOOD PRESSURE: 70 MMHG | HEART RATE: 66 BPM | SYSTOLIC BLOOD PRESSURE: 107 MMHG | OXYGEN SATURATION: 93 % | TEMPERATURE: 98.24 F

## 2017-10-01 LAB
ANION GAP SERPL CALC-SCNC: 7 MMOL/L (ref 3–11)
BUN SERPL-MCNC: 51 MG/DL (ref 7–18)
BUN/CREAT SERPL: 29.7 (ref 10–20)
CALCIUM SERPL-MCNC: 8.3 MG/DL (ref 8.5–10.1)
CHLORIDE SERPL-SCNC: 102 MMOL/L (ref 98–107)
CO2 SERPL-SCNC: 27 MMOL/L (ref 21–32)
CREAT CL PREDICTED SERPL C-G-VRATE: 36.5 ML/MIN
CREAT SERPL-MCNC: 1.7 MG/DL (ref 0.6–1.4)
EOSINOPHIL NFR BLD AUTO: 271 K/UL (ref 130–400)
GLUCOSE SERPL-MCNC: 104 MG/DL (ref 70–99)
HCT VFR BLD CALC: 40.6 % (ref 42–52)
INR PPP: 1.1 (ref 0.9–1.1)
MAGNESIUM SERPL-MCNC: 2.4 MG/DL (ref 1.8–2.4)
MCH RBC QN AUTO: 27.8 PG (ref 25–34)
MCHC RBC AUTO-ENTMCNC: 30.8 G/DL (ref 32–36)
MCV RBC AUTO: 90.2 FL (ref 80–100)
PMV BLD AUTO: 10.4 FL (ref 7.4–10.4)
POTASSIUM SERPL-SCNC: 4.2 MMOL/L (ref 3.5–5.1)
PROTHROMBIN TIME: 12 SECONDS (ref 9–12)
RBC # BLD AUTO: 4.5 M/UL (ref 4.7–6.1)
SODIUM SERPL-SCNC: 136 MMOL/L (ref 136–145)
WBC # BLD AUTO: 16.99 K/UL (ref 4.8–10.8)

## 2017-10-01 RX ADMIN — TAMSULOSIN HYDROCHLORIDE SCH MG: 0.4 CAPSULE ORAL at 22:21

## 2017-10-01 RX ADMIN — LACTOBACILLUS TAB SCH TAB: TAB at 14:01

## 2017-10-01 RX ADMIN — AMIODARONE HYDROCHLORIDE SCH MG: 200 TABLET ORAL at 13:58

## 2017-10-01 RX ADMIN — LEVOTHYROXINE SODIUM SCH MCG: 75 TABLET ORAL at 06:12

## 2017-10-01 RX ADMIN — CEFAZOLIN SCH MLS/HR: 10 INJECTION, POWDER, FOR SOLUTION INTRAVENOUS at 17:06

## 2017-10-01 RX ADMIN — PANTOPRAZOLE SCH MG: 40 TABLET, DELAYED RELEASE ORAL at 06:12

## 2017-10-01 RX ADMIN — WARFARIN SODIUM SCH MG: 2.5 TABLET ORAL at 17:06

## 2017-10-01 RX ADMIN — ATORVASTATIN CALCIUM SCH MG: 10 TABLET, FILM COATED ORAL at 13:58

## 2017-10-01 RX ADMIN — Medication SCH MG: at 13:57

## 2017-10-01 RX ADMIN — CEFAZOLIN SCH MLS/HR: 10 INJECTION, POWDER, FOR SOLUTION INTRAVENOUS at 23:34

## 2017-10-01 NOTE — DIAGNOSTIC IMAGING REPORT
R ANKLE MIN 3 VIEWS ROUTINE



HISTORY:  86 years-old Male ORIF OF RIGHT ANKLE follow-up study of distal

fibular fracture.



COMPARISON: Right ankle radiographs 9/30/2017



TECHNIQUE: 8 spot fluoroscopic images of the right ankle were obtained utilizing

42.1 seconds of fluoroscopy time.



FINDINGS: 

There has been interval ORIF of the distal fibula and posterior malleolus of the

distal tibia. There is improved alignment of the distal fibular fracture status

post plate and screw fixation. Single cannulated screw from a posterior approach

fixates the posterior malleolus fracture. Soft tissue swelling persists about

the ankle and lower leg.



IMPRESSION: Status post ORIF of the distal tibia and fibula with satisfactory

alignment as above. 







The above report was generated using voice recognition software. It may contain

grammatical, syntax or spelling errors.







Electronically signed by:  Sameer Sanchez M.D.

10/1/2017 10:38 AM



Dictated Date/Time:  10/1/2017 10:36 AM

## 2017-10-01 NOTE — OPERATIVE REPORT
DATE OF OPERATION:  09/30/2017

 

PREOPERATIVE DIAGNOSES:  Right ankle fracture dislocation with distal fibula

and posterior malleolus fractures.

 

POSTOPERATIVE DIAGNOSES:  Right ankle fracture dislocation with distal fibula

and posterior malleolus fractures.

 

OPERATION PERFORMED:  Open reduction and internal fixation of right ankle

fracture dislocation.

 

SURGEON:  Santo Brito MD

 

ASSISTANT:  Shilpi Vu PA-C

 

ANESTHESIA:  General endotracheal anesthesia with regional block.

 

ESTIMATED BLOOD LOSS:  25 mL.

 

IV FLUIDS:  1300 mL crystalloid.

 

IMPLANTS:  One Synthes 7-hole 1/3 tubular plate with seven 3.5 mm cortical

screws as well as a 40 mm 4.0 cannulated cancellous screw with washer.

 

INDICATIONS:  Mr. Francis is an 86-year-old gentleman, who slipped yesterday

morning, twisting his ankle.  He was brought to the emergency room where he

was found to have a fracture dislocation.  He is on Coumadin for atrial

fibrillation.  His INR was reversed yesterday.  He had his fracture reduced

in the Emergency Room.  I had a long discussion with him and his family about

the fracture.  Recommend surgical intervention.  We reviewed the risks and

benefits of surgery.  All questions were answered and informed consent was

signed.

 

OPERATIVE FINDINGS:  The posterior malleolus fracture was reduced with a 4.0

cancellous screw and washer from back to front.  The distal fibula fracture

was stabilized with a posterior antiglide plate using a 1/3 tubular plate and

screws.  This was all accomplished through a posterior lateral incision.

 

DESCRIPTION OF OPERATION:  The patient was identified on the floor yesterday

where surgical site was marked.  He was met in the preop area as well. 

Anesthesia performed a regional nerve block.  He was then brought back to

main operating room where general anesthesia was induced on the bed.  He was

then carefully rolled in the prone position.  All bony prominences were

padded.  Perioperative antibiotics were administered.  We brought in C-arm to

check both ankles.  He had a history of a nondisplaced left ankle fracture

which we did not have any x-rays of.  I was able to visualize a small

cortical irregularity in the fibula under fluoroscopy; however, this was not 
optimal and so we

will plan on getting plain x-rays of the left ankle in the recovery room.  The

fluoroscopy was used to check the right ankle fracture as well and we were

able to get good views.  The leg was then prepped and draped in normal

sterile fashion.  The timeout was performed prior to incision.  All in the

room were in agreement.

 

We began by making a 10 cm long incision just posterior to the posterior

border of the fibula.  The tourniquet had been inflated after we

exsanguinated the leg with an Esmarch bandage.  We then dissected down the

subcutaneous tissues.  We did not encounter the sural nerve in our

dissection.  Small crossing venous branches were coagulated by cautery.  We

then identified the fascial plane between the peroneals and the flexor

hallucis longus.  This was incised with cautery.  The peroneals were

retracted laterally and the FHL was moved medially to expose the posterior

aspect of the tibia.  The fracture line was identified.  It was reduced in

the anatomic fashion.  It was too small to get a T plate on and was also

oblique, so I felt that the best form of stabilization would be a single

screw with washer.  A K-wire was placed first under fluoroscopic guidance. 

We then drilled and measured for a cancellous screw which was placed without

difficulty.

 

We then moved on to the fibula.  The peroneals were dissected off the

posterior border of the fibula.  The fracture line was opened up, irrigated

and curetted to remove any fracture hematoma.  A pointed tenaculum was used

to reduce the fracture through the hole of the 1/3 tubular plate.  The plate

was optimally positioned and one screw was placed above and one screw below

the fracture.  X-ray was brought in to verify the proper position of the

plate and the fracture reduction.  We then filled the remaining holes of the

plate including a lag screw across the fracture.  X-ray was again brought in

to check the reduction of his fracture which we were happy with.  We then

made sure all the screws were tight, irrigated out the wound and closed the

fascial incisions with a running 2-0 Vicryl.  The deep dermis was closed with

inverted 3-0 Vicryl in an interrupted fashion.  A 3-0 nylons in a horizontal

mattress fashion were placed for the skin.  Xeroform was placed followed by a

4 x 4s cotton padding and a posterior and U plaster splint with the ankle held

at neutral.  The patient was then awoken from anesthesia after being

carefully rolled in the supine position.  He was extubated and transferred to

recovery room in stable condition.

 

POSTOPERATIVE COURSE:  The patient will be readmitted to the floor.  We will

restart his Coumadin starting tomorrow.  We will aim for an INR of no greater

than 2.5 to minimize the chance of post-surgical bleeding.  He is going to

need to be nonweightbearing on this leg for the next 6 weeks.  He will stay

in this splint until his 2-week followup at which point we will plan on

transitioning him to a short leg nonweightbearing cast.  Because of his other

ankle fracture, I am going to recommend that he is nonweightbearing on the

bilateral lower extremities as well and that means he will have to go in a

wheelchair and likely need a referral to a skilled nursing facility from the

hospital.

 

 

I attest to the content of the Intraoperative Record and any orders documented 
therein. Any exceptions are noted below.

 

 

 

MARILIA

## 2017-10-01 NOTE — DIAGNOSTIC IMAGING REPORT
R ANKLE MIN 3 VIEWS ROUTINE



HISTORY:  86 years-old Male s/p ORIF R ankle fracture dislocation status post

ORIF of right ankle fracture



COMPARISON: Spot fluoroscopic images of the right ankle of same day



TECHNIQUE: 3 views of the right ankle



FINDINGS: 

Plate and screw fusion hardware of the distal fibula fixating a previously noted

obliquely oriented fracture is in place with satisfactory alignment. Single

cannulated screw from a dorsal approach fixates the posterior malleolus fracture

which is also in satisfactory alignment. Fine bony detail obscured by overlying

casting material. There is mild dorsal spurring of the midfoot. No retained

foreign body identified. Post surgical soft tissue swelling.



IMPRESSION: Status post ORIF of the distal tibia and fibula with satisfactory

alignment as above. 







The above report was generated using voice recognition software. It may contain

grammatical, syntax or spelling errors.







Electronically signed by:  Sameer Sanchez M.D.

10/1/2017 12:08 PM



Dictated Date/Time:  10/1/2017 12:07 PM

## 2017-10-01 NOTE — ANESTHESIOLOGY PROGRESS NOTE
Anesthesia Post Op Note


Date & Time


Oct 1, 2017 at 11:58





Vital Signs


Pain Intensity:  0





Vital Signs Past 12 Hours








  Date Time  Temp Pulse Resp B/P (MAP) Pulse Ox O2 Delivery O2 Flow Rate FiO2


 


10/1/17 11:50 36.5 67 16 125/64 100 Nasal Cannula 2 


 


10/1/17 11:40  68 16 123/70 100 Nasal Cannula 2 


 


10/1/17 11:30  68 16 129/65 100 Oxymask 10 


 


10/1/17 11:20  67 16 133/64 100 Oxymask 10 


 


10/1/17 11:11 36 68 16 145/70 100 Oxymask 10 


 


10/1/17 07:35 36.8 66 16 107/70 (82) 93 Room Air  


 


10/1/17 07:15      Room Air  











Notes


Mental Status:  alert / awake / arousable, participated in evaluation


Pt Amnestic to Procedure:  Yes


Nausea / Vomiting:  adequately controlled


Pain:  adequately controlled


Airway Patency, RR, SpO2:  stable & adequate


BP & HR:  stable & adequate


Hydration State:  stable & adequate


Anesthetic Complications:  no major complications apparent


Pt awake. Doing well. Pain controlled. VSS.

## 2017-10-01 NOTE — DIAGNOSTIC IMAGING REPORT
L ANKLE MIN 3 VIEWS ROUTINE



HISTORY:  86 years-old Male history of L ankle fracture with bruising laterally

acute left ankle pain and bruising.



COMPARISON: None available.



TECHNIQUE: 3 views of the left ankle



FINDINGS: 

There is mild soft tissue swelling of the left lower leg and circumferentially

about the ankle, greatest laterally. There is no acute fracture or dislocation

identified. Mild talonavicular osteoarthritis. Small plantar enthesophyte about

the calcaneus. Peripheral vascular calcifications are noted.



IMPRESSION: 

1. Mild soft tissue swelling about the lower leg and ankle, greatest laterally

without acute fracture or dislocation.

2. Mild midfoot degenerative changes with small plantar enthesophyte about the

calcaneus. 







The above report was generated using voice recognition software. It may contain

grammatical, syntax or spelling errors.







Electronically signed by:  Sameer Sanchez M.D.

10/1/2017 12:10 PM



Dictated Date/Time:  10/1/2017 12:08 PM

## 2017-10-01 NOTE — HISTORY & PHYSICAL BRIDGE NOTE
H&P Re-Evaluation


Bridge Note:


I have examined the patient, reviewed the History & Physical and in the 

interval since the performance of the History & Physical I have noted the 

following changes of clinical significance: his INR this morning is 1.1, 

appropriate for proceeding with surgery. No other changes.

## 2017-10-01 NOTE — [facility] POST OPERATIVE BRIEF NOTE
Immediate Operative Summary


Operative Date


Oct 1, 2017.





Pre-Operative Diagnosis





Right ankle flat fracture dislocation and subacute left ankle fracture





Post-Operative Diagnosis





same as preop





Procedure(s) Performed





Open reduction internal fixation right ankle fracture





Surgeon


Dr. Brito





Assistant Surgeon(s)


Leanna Vu PA-C





Estimated Blood Loss


25 ml





Findings


Posterior malleolus fracture stabilized with 4.0 mm cancellous screw and 

washer.  Fibula fracture stabilized with 1/3 tubular antiglide plate and seven 

3.5 mm cortical screws





Fluids (cc crystalloids)


1300cc





Specimens





none





Drains


none





Anesthesia


GETA with block





Complication(s)


None





Disposition


Recovery Room / PACU

## 2017-10-02 VITALS
SYSTOLIC BLOOD PRESSURE: 111 MMHG | HEART RATE: 79 BPM | OXYGEN SATURATION: 93 % | DIASTOLIC BLOOD PRESSURE: 62 MMHG | TEMPERATURE: 98.06 F

## 2017-10-02 VITALS
TEMPERATURE: 97.52 F | HEART RATE: 65 BPM | DIASTOLIC BLOOD PRESSURE: 57 MMHG | SYSTOLIC BLOOD PRESSURE: 120 MMHG | OXYGEN SATURATION: 99 %

## 2017-10-02 VITALS
SYSTOLIC BLOOD PRESSURE: 131 MMHG | TEMPERATURE: 97.7 F | HEART RATE: 64 BPM | OXYGEN SATURATION: 98 % | DIASTOLIC BLOOD PRESSURE: 73 MMHG

## 2017-10-02 VITALS — OXYGEN SATURATION: 98 %

## 2017-10-02 VITALS
OXYGEN SATURATION: 95 % | HEART RATE: 68 BPM | DIASTOLIC BLOOD PRESSURE: 67 MMHG | SYSTOLIC BLOOD PRESSURE: 116 MMHG | TEMPERATURE: 97.88 F

## 2017-10-02 LAB
ANION GAP SERPL CALC-SCNC: 8 MMOL/L (ref 3–11)
BUN SERPL-MCNC: 35 MG/DL (ref 7–18)
BUN/CREAT SERPL: 23.3 (ref 10–20)
CALCIUM SERPL-MCNC: 8.9 MG/DL (ref 8.5–10.1)
CHLORIDE SERPL-SCNC: 99 MMOL/L (ref 98–107)
CO2 SERPL-SCNC: 30 MMOL/L (ref 21–32)
CREAT CL PREDICTED SERPL C-G-VRATE: 41.3 ML/MIN
CREAT SERPL-MCNC: 1.5 MG/DL (ref 0.6–1.4)
EOSINOPHIL NFR BLD AUTO: 261 K/UL (ref 130–400)
GLUCOSE SERPL-MCNC: 103 MG/DL (ref 70–99)
HCT VFR BLD CALC: 37.7 % (ref 42–52)
INR PPP: 1.1 (ref 0.9–1.1)
MAGNESIUM SERPL-MCNC: 2.3 MG/DL (ref 1.8–2.4)
MCH RBC QN AUTO: 29.5 PG (ref 25–34)
MCHC RBC AUTO-ENTMCNC: 32.9 G/DL (ref 32–36)
MCV RBC AUTO: 89.8 FL (ref 80–100)
PMV BLD AUTO: 10.7 FL (ref 7.4–10.4)
POTASSIUM SERPL-SCNC: 4 MMOL/L (ref 3.5–5.1)
PROTHROMBIN TIME: 11.4 SECONDS (ref 9–12)
RBC # BLD AUTO: 4.2 M/UL (ref 4.7–6.1)
SODIUM SERPL-SCNC: 137 MMOL/L (ref 136–145)
WBC # BLD AUTO: 17.74 K/UL (ref 4.8–10.8)

## 2017-10-02 RX ADMIN — LEVOTHYROXINE SODIUM SCH MCG: 75 TABLET ORAL at 05:47

## 2017-10-02 RX ADMIN — HYDROCODONE BITARTRATE AND ACETAMINOPHEN PRN TAB: 5; 325 TABLET ORAL at 01:24

## 2017-10-02 RX ADMIN — Medication SCH MG: at 08:32

## 2017-10-02 RX ADMIN — LACTOBACILLUS TAB SCH TAB: TAB at 08:32

## 2017-10-02 RX ADMIN — WARFARIN SODIUM SCH MG: 2.5 TABLET ORAL at 16:31

## 2017-10-02 RX ADMIN — HYDROCODONE BITARTRATE AND ACETAMINOPHEN PRN TAB: 5; 325 TABLET ORAL at 09:45

## 2017-10-02 RX ADMIN — AMIODARONE HYDROCHLORIDE SCH MG: 200 TABLET ORAL at 08:32

## 2017-10-02 RX ADMIN — TAMSULOSIN HYDROCHLORIDE SCH MG: 0.4 CAPSULE ORAL at 20:51

## 2017-10-02 RX ADMIN — ATORVASTATIN CALCIUM SCH MG: 10 TABLET, FILM COATED ORAL at 08:32

## 2017-10-02 RX ADMIN — PANTOPRAZOLE SCH MG: 40 TABLET, DELAYED RELEASE ORAL at 05:47

## 2017-10-02 NOTE — PROGRESS NOTE
Subjective


Date of Service:


Oct 2, 2017.


Subjective


Pt evaluation today including:  conversation w/ patient, physical exam, lab 

review, review of studies, review of inpatient medication list





Problem List


Medical Problems:


(1) NEGAR (acute kidney injury)


Status: Acute  





(2) Ankle dislocation


Status: Acute  





(3) Dehydration


Status: Acute  





(4) Fall


Status: Acute  





(5) Fibula fracture


Status: Acute  





(6) Supratherapeutic INR


Status: Acute  











Review of Systems


Constitutional:  No fever, No chills


Respiratory:  No shortness of breath


Cardiac:  No chest pain, No palpitations


Abdomen:  + constipation, No pain, No nausea, No vomiting, No diarrhea


Musculoskeletal:  No joint pain





Objective


Vital Signs











  Date Time  Temp Pulse Resp B/P (MAP) Pulse Ox O2 Delivery O2 Flow Rate FiO2


 


10/2/17 15:13 36.6 68 18 116/67 (83) 95 Room Air  


 


10/2/17 07:20      Room Air  


 


10/2/17 07:12 36.5 64 16 131/73 (92) 98 Room Air  


 


10/2/17 03:20 36.4 65 16 120/57 (78) 99 Room Air  


 


10/2/17 00:24     98 Room Air  


 


10/1/17 23:30      Room Air  


 


10/1/17 23:00 36.5 66 16 118/71 (87) 100 Nasal Cannula 2.0 











Physical Exam


General Appearance:  no apparent distress


Respiratory/Chest:  no respiratory distress, no accessory muscle use


Cardiovascular:  regular rate, rhythm, no edema, no murmur


Abdomen:  normal bowel sounds, non tender, soft





Laboratory Results





Last 24 Hours








Test


  10/2/17


07:11


 


White Blood Count 17.74 K/uL 


 


Red Blood Count 4.20 M/uL 


 


Hemoglobin 12.4 g/dL 


 


Hematocrit 37.7 % 


 


Mean Corpuscular Volume 89.8 fL 


 


Mean Corpuscular Hemoglobin 29.5 pg 


 


Mean Corpuscular Hemoglobin


Concent 32.9 g/dl 


 


 


RDW Standard Deviation 53.7 fL 


 


RDW Coefficient of Variation 16.3 % 


 


Platelet Count 261 K/uL 


 


Mean Platelet Volume 10.7 fL 


 


Prothrombin Time 11.4 SECONDS 


 


Prothromb Time International


Ratio 1.1 


 


 


Sodium Level 137 mmol/L 


 


Potassium Level 4.0 mmol/L 


 


Chloride Level 99 mmol/L 


 


Carbon Dioxide Level 30 mmol/L 


 


Anion Gap 8.0 mmol/L 


 


Blood Urea Nitrogen 35 mg/dl 


 


Creatinine 1.50 mg/dl 


 


Est Creatinine Clear Calc


Drug Dose 41.3 ml/min 


 


 


Estimated GFR (


American) 48.2 


 


 


Estimated GFR (Non-


American 41.6 


 


 


BUN/Creatinine Ratio 23.3 


 


Random Glucose 103 mg/dl 


 


Calcium Level 8.9 mg/dl 


 


Magnesium Level 2.3 mg/dl 











Assessment and Plan


This is an 86 year old male with a PMH of paroxysmal atrial fibrillation on 

Coumadin, HTN, hypothyroidism, CKD stage 3, essential tremor, with mechanical 

fall and R posterior malleolar fracture





R Posterior Malleolar Fracture


L Ankle Pain


10/2


s/p ORIF POD #1


Doing well


Restarted Coumadin at current dose, monitor INR, will need cautious elevation 

of INR





9/30


patient presented status post mechanical fall


fracture R posterior malleolar fracture


this was also dislocated and subsequently reduced by the ER physician


orthopedic consultation placed


currently on bedrest


morphine and tramadol PRN





Paroxysmal Atrial Fibrillation


currently in NSR


continue Amiodarone


hold Coumadin, due to elevated INR





Leukocytosis


secondary to prednisone use


hold steroids, and hold abx. - no sign of infection currently





Hypothyroidism


continue Synthroid





CKD stage 3


avoid nephrotoxic agents when able


gentle hydration





DVT ppx


Coumadin





FULL CODE

## 2017-10-02 NOTE — ORTHOPEDIC PROGRESS NOTE
Orthopedic Progress Note


Date of Service


Oct 2, 2017.





Subjective


Post OP Day:  1


Reports: feeling well, pain controlled w PO medications, Denies: complaints, 

chest pain, SOB, nausea / vomiting, light headedness, calf pain


Additional Notes:


States no pain in right ankle, still "can't feel anything".  Complaining of 

pain in left ankle.





Objective


N/V intact, splint C/D/I, capillary refill less than 2 sec., A&O x3, toes mobile


Distal sensation intact.  Right foot elevated on pillows.











  Date Time  Temp Pulse Resp B/P (MAP) Pulse Ox O2 Delivery O2 Flow Rate FiO2


 


10/2/17 15:13 36.6 68 18 116/67 (83) 95 Room Air  


 


10/2/17 07:20      Room Air  


 


10/2/17 07:12 36.5 64 16 131/73 (92) 98 Room Air  


 


10/2/17 03:20 36.4 65 16 120/57 (78) 99 Room Air  


 


10/2/17 00:24     98 Room Air  


 


10/1/17 23:30      Room Air  


 


10/1/17 23:00 36.5 66 16 118/71 (87) 100 Nasal Cannula 2.0 








Laboratory Results 24 Hours:











Test


  10/2/17


07:11


 


Hematocrit 37.7 % 


 


Hemoglobin 12.4 g/dL 


 


Prothromb Time International


Ratio 1.1 


 


 


Prothrombin Time 11.4 SECONDS 











Assessment & Plan


Assessment:


POD 1 - ORIF right ankle bimalleolar fracture


Plan:


OOB to chair, TTWB right ankle for transfers only, needs to maintain NWB 

otherwise bilateral lower extremities


continue Ice/elevation as needed for pain/swelling


PT/OT


Pain medication as prescribed


Will continue to follow


Seen and evaluated by Dr. Brito this afternoon.


Care as per primary team.

## 2017-10-03 VITALS
TEMPERATURE: 97.52 F | HEART RATE: 78 BPM | SYSTOLIC BLOOD PRESSURE: 109 MMHG | OXYGEN SATURATION: 94 % | DIASTOLIC BLOOD PRESSURE: 65 MMHG

## 2017-10-03 VITALS
SYSTOLIC BLOOD PRESSURE: 116 MMHG | TEMPERATURE: 98.24 F | DIASTOLIC BLOOD PRESSURE: 70 MMHG | HEART RATE: 81 BPM | OXYGEN SATURATION: 97 %

## 2017-10-03 VITALS
OXYGEN SATURATION: 95 % | DIASTOLIC BLOOD PRESSURE: 58 MMHG | TEMPERATURE: 97.88 F | HEART RATE: 73 BPM | SYSTOLIC BLOOD PRESSURE: 109 MMHG

## 2017-10-03 LAB
ANION GAP SERPL CALC-SCNC: 10 MMOL/L (ref 3–11)
BUN SERPL-MCNC: 34 MG/DL (ref 7–18)
BUN/CREAT SERPL: 21.3 (ref 10–20)
CALCIUM SERPL-MCNC: 8.7 MG/DL (ref 8.5–10.1)
CHLORIDE SERPL-SCNC: 98 MMOL/L (ref 98–107)
CO2 SERPL-SCNC: 27 MMOL/L (ref 21–32)
CREAT CL PREDICTED SERPL C-G-VRATE: 38.7 ML/MIN
CREAT SERPL-MCNC: 1.6 MG/DL (ref 0.6–1.4)
EOSINOPHIL NFR BLD AUTO: 243 K/UL (ref 130–400)
GLUCOSE SERPL-MCNC: 107 MG/DL (ref 70–99)
HCT VFR BLD CALC: 38.3 % (ref 42–52)
INR PPP: 1.5 (ref 0.9–1.1)
MAGNESIUM SERPL-MCNC: 2.1 MG/DL (ref 1.8–2.4)
MCH RBC QN AUTO: 28 PG (ref 25–34)
MCHC RBC AUTO-ENTMCNC: 31.1 G/DL (ref 32–36)
MCV RBC AUTO: 90.1 FL (ref 80–100)
PMV BLD AUTO: 10.6 FL (ref 7.4–10.4)
POTASSIUM SERPL-SCNC: 4 MMOL/L (ref 3.5–5.1)
PROTHROMBIN TIME: 16 SECONDS (ref 9–12)
RBC # BLD AUTO: 4.25 M/UL (ref 4.7–6.1)
SODIUM SERPL-SCNC: 135 MMOL/L (ref 136–145)
WBC # BLD AUTO: 16.5 K/UL (ref 4.8–10.8)

## 2017-10-03 RX ADMIN — LEVOTHYROXINE SODIUM SCH MCG: 75 TABLET ORAL at 05:38

## 2017-10-03 RX ADMIN — PANTOPRAZOLE SCH MG: 40 TABLET, DELAYED RELEASE ORAL at 05:38

## 2017-10-03 RX ADMIN — HYDROCODONE BITARTRATE AND ACETAMINOPHEN PRN TAB: 5; 325 TABLET ORAL at 23:23

## 2017-10-03 RX ADMIN — TAMSULOSIN HYDROCHLORIDE SCH MG: 0.4 CAPSULE ORAL at 20:54

## 2017-10-03 RX ADMIN — LACTOBACILLUS TAB SCH TAB: TAB at 09:06

## 2017-10-03 RX ADMIN — Medication SCH MG: at 09:00

## 2017-10-03 RX ADMIN — WARFARIN SODIUM SCH MG: 2.5 TABLET ORAL at 17:15

## 2017-10-03 RX ADMIN — ATORVASTATIN CALCIUM SCH MG: 10 TABLET, FILM COATED ORAL at 09:06

## 2017-10-03 RX ADMIN — AMIODARONE HYDROCHLORIDE SCH MG: 200 TABLET ORAL at 09:06

## 2017-10-03 NOTE — PROGRESS NOTE
Internal Med Progress Note


Date of Service:


Oct 3, 2017.


Provider Documentation:





SUBJECTIVE:





The patient was seen and examined 


Feels very weak and lethargic today 


Pain seems to be controlled 








OBJECTIVE:





Vital Signs-as noted below





Exam:


General-No distress at rest


Eyes-normal


ENT-normal


Neck-supple


Lungs-clear to auscultate bilaterally 


Heart-Regular,no murmur appreciated


Abdomen-Benign,no masses,bowel sound present 


Extremities-Right Ankle is bandaged  


Neuro-AAOx3


Generally weak and lethargic





Lab data as noted below.


ASSESSMENT & PLAN:








This is an 86 year old male with a PMH of paroxysmal atrial fibrillation on 

Coumadin, HTN, Hypothyroidism, CKD stage 3, Essential tremor, with mechanical 

fall and R posterior malleolar fracture





R Posterior Malleolar Fracture


Patient presented status post mechanical fall


Fracture R posterior malleolar fracture


This was also dislocated and subsequently reduced by the ER physician


Appreciate Ortho input 


S/P ORIF POD #2


Doing well following surgery 


PT/OT 


Awaiting Placement 








Paroxysmal Atrial Fibrillation


Currently in NSR


Continue Amiodarone


Coumadin was on Hold 





Leukocytosis


Secondary to use of Steroid


Will monitor 





Hypothyroidism


continue Synthroid





CKD stage 3


Avoid nephrotoxic agents when able


Gentle hydration





DVT ppx


On Coumadin 


INR 1.5 today 





FULL CODE





Disposition


Likely discharge in a day or two


Vital Signs:











  Date Time  Temp Pulse Resp B/P (MAP) Pulse Ox O2 Delivery O2 Flow Rate FiO2


 


10/3/17 08:00      Room Air  


 


10/3/17 07:10 36.8 81 17 116/70 (85) 97 Room Air  


 


10/2/17 23:30      Room Air  


 


10/2/17 22:54 36.7 79 18 111/62 (78) 93 Room Air  


 


10/2/17 16:15      Room Air  


 


10/2/17 15:13 36.6 68 18 116/67 (83) 95 Room Air  








Lab Results:





Results Past 24 Hours








Test


  10/3/17


06:49 Range/Units


 


 


White Blood Count 16.50 4.8-10.8  K/uL


 


Red Blood Count 4.25 4.7-6.1  M/uL


 


Hemoglobin 11.9 14.0-18.0  g/dL


 


Hematocrit 38.3 42-52  %


 


Mean Corpuscular Volume 90.1   fL


 


Mean Corpuscular Hemoglobin 28.0 25-34  pg


 


Mean Corpuscular Hemoglobin


Concent 31.1


  32-36  g/dl


 


 


RDW Standard Deviation 53.6 36.4-46.3  fL


 


RDW Coefficient of Variation 16.3 11.5-14.5  %


 


Platelet Count 243 130-400  K/uL


 


Mean Platelet Volume 10.6 7.4-10.4  fL


 


Prothrombin Time


  16.0


  9.0-12.0


SECONDS


 


Prothromb Time International


Ratio 1.5


  0.9-1.1  


 


 


Sodium Level 135 136-145  mmol/L


 


Potassium Level 4.0 3.5-5.1  mmol/L


 


Chloride Level 98   mmol/L


 


Carbon Dioxide Level 27 21-32  mmol/L


 


Anion Gap 10.0 3-11  mmol/L


 


Blood Urea Nitrogen 34 7-18  mg/dl


 


Creatinine


  1.60


  0.60-1.40


mg/dl


 


Est Creatinine Clear Calc


Drug Dose 38.7


   ml/min


 


 


Estimated GFR (


American) 44.6


   


 


 


Estimated GFR (Non-


American 38.4


   


 


 


BUN/Creatinine Ratio 21.3 10-20  


 


Random Glucose 107 70-99  mg/dl


 


Calcium Level 8.7 8.5-10.1  mg/dl


 


Magnesium Level 2.1 1.8-2.4  mg/dl

## 2017-10-03 NOTE — ORTHOPEDIC PROGRESS NOTE
Orthopedic Progress Note


Date of Service


Oct 3, 2017.





Subjective


Post OP Day:  2


Reports: feeling well, pain controlled w PO medications, Denies: complaints, 

chest pain, SOB, nausea / vomiting, light headedness, calf pain, using PCA





Objective


N/V intact, splint C/D/I, capillary refill less than 2 sec., dressing C/D/I, A&

O x3, toes mobile, CMS intact


Mild referred pain with resisted flexion at IP and MTP joints of Rt foot.  No 

pain with resisted extension.  Toes mobile.  Able to perform SLRT without 

difficulty.  Able to fully flex and extend at knee (0-125)











  Date Time  Temp Pulse Resp B/P (MAP) Pulse Ox O2 Delivery O2 Flow Rate FiO2


 


10/3/17 08:00      Room Air  


 


10/3/17 07:10 36.8 81 17 116/70 (85) 97 Room Air  


 


10/2/17 23:30      Room Air  


 


10/2/17 22:54 36.7 79 18 111/62 (78) 93 Room Air  


 


10/2/17 16:15      Room Air  


 


10/2/17 15:13 36.6 68 18 116/67 (83) 95 Room Air  








Laboratory Results 24 Hours:











Test


  10/3/17


06:49


 


Hematocrit 38.3 % 


 


Hemoglobin 11.9 g/dL 


 


Prothromb Time International


Ratio 1.5 


 


 


Prothrombin Time 16.0 SECONDS 











Assessment & Plan


Assessment:


POD 2 - ORIF right ankle bimalleolar fracture


Plan:


OOB to chair, TTWB right ankle for transfers only, needs to maintain NWB 

otherwise bilateral lower extremities


continue Ice/elevation as needed for pain/swelling


PT/OT


Pain medication as prescribed


Will continue to follow


Goal INR is 2.0


Patient is accepted to [facility] when deemed medically stable and prior 

auth for insurance obtained.


Care as per primary team.

## 2017-10-04 VITALS
TEMPERATURE: 98.24 F | OXYGEN SATURATION: 95 % | SYSTOLIC BLOOD PRESSURE: 101 MMHG | HEART RATE: 74 BPM | DIASTOLIC BLOOD PRESSURE: 60 MMHG

## 2017-10-04 VITALS
DIASTOLIC BLOOD PRESSURE: 67 MMHG | OXYGEN SATURATION: 97 % | TEMPERATURE: 97.7 F | HEART RATE: 71 BPM | SYSTOLIC BLOOD PRESSURE: 104 MMHG

## 2017-10-04 VITALS
OXYGEN SATURATION: 97 % | SYSTOLIC BLOOD PRESSURE: 102 MMHG | TEMPERATURE: 97.88 F | DIASTOLIC BLOOD PRESSURE: 62 MMHG | HEART RATE: 71 BPM

## 2017-10-04 LAB
EOSINOPHIL NFR BLD AUTO: 229 K/UL (ref 130–400)
HCT VFR BLD CALC: 34.1 % (ref 42–52)
INR PPP: 2.2 (ref 0.9–1.1)
MCH RBC QN AUTO: 29.7 PG (ref 25–34)
MCHC RBC AUTO-ENTMCNC: 33.1 G/DL (ref 32–36)
MCV RBC AUTO: 89.5 FL (ref 80–100)
PMV BLD AUTO: 10.7 FL (ref 7.4–10.4)
PROTHROMBIN TIME: 24.1 SECONDS (ref 9–12)
RBC # BLD AUTO: 3.81 M/UL (ref 4.7–6.1)
WBC # BLD AUTO: 14.65 K/UL (ref 4.8–10.8)

## 2017-10-04 RX ADMIN — ATORVASTATIN CALCIUM SCH MG: 10 TABLET, FILM COATED ORAL at 09:19

## 2017-10-04 RX ADMIN — LACTOBACILLUS TAB SCH TAB: TAB at 09:19

## 2017-10-04 RX ADMIN — TAMSULOSIN HYDROCHLORIDE SCH MG: 0.4 CAPSULE ORAL at 20:53

## 2017-10-04 RX ADMIN — PANTOPRAZOLE SCH MG: 40 TABLET, DELAYED RELEASE ORAL at 05:26

## 2017-10-04 RX ADMIN — HYDROCODONE BITARTRATE AND ACETAMINOPHEN PRN TAB: 5; 325 TABLET ORAL at 23:24

## 2017-10-04 RX ADMIN — Medication SCH MG: at 09:00

## 2017-10-04 RX ADMIN — AMIODARONE HYDROCHLORIDE SCH MG: 200 TABLET ORAL at 09:18

## 2017-10-04 RX ADMIN — LEVOTHYROXINE SODIUM SCH MCG: 75 TABLET ORAL at 05:26

## 2017-10-04 NOTE — PROGRESS NOTE
Internal Med Progress Note


Date of Service:


Oct 4, 2017.


Provider Documentation:





SUBJECTIVE:





The patient was seen and examined 


Feels very weak and lethargic today 


Pain seems to be controlled 


Denies any other symptoms














OBJECTIVE:





Vital Signs-as noted below





Exam:


General-No distress at rest


Eyes-normal


ENT-normal


Neck-supple


Lungs-clear to auscultate bilaterally 


Heart-Regular,no murmur appreciated


Abdomen-Benign,no masses,bowel sound present 


Extremities-Right Ankle is bandaged  


Neuro-AAOx3


Generally weak and lethargic





Lab data as noted below.


ASSESSMENT & PLAN:








This is an 86 year old male with a PMH of paroxysmal atrial fibrillation on 

Coumadin, HTN, Hypothyroidism, CKD stage 3, Essential tremor, with mechanical 

fall and R posterior malleolar fracture





R Posterior Malleolar Fracture


Patient presented status post mechanical fall


Fracture R posterior malleolar fracture


This was also dislocated and subsequently reduced by the ER physician


Appreciate Ortho input 


S/P ORIF POD # 3


Doing well following surgery 


PT/OT -requested 


Clinically better today 








Paroxysmal Atrial Fibrillation


Currently in NSR


Continue Amiodarone


Coumadin was on Hold for today 





Leukocytosis


Secondary to use of Steroid


Will monitor -improving





Hypothyroidism


continue Synthroid





CKD stage 3


Avoid nephrotoxic agents when able


Gentle hydration


Remains stable 





DVT ppx


On Coumadin 


INR 2.2 today 





FULL CODE





Disposition


Likely discharge in a day or two


Vital Signs:











  Date Time  Temp Pulse Resp B/P (MAP) Pulse Ox O2 Delivery O2 Flow Rate FiO2


 


10/4/17 08:10      Room Air  


 


10/4/17 07:18 36.5 71 16 104/67 (79) 97 Room Air  


 


10/3/17 23:25 36.6 73 18 109/58 (75) 95 Room Air  


 


10/3/17 23:15      Room Air  


 


10/3/17 17:15      Room Air  


 


10/3/17 14:59 36.4 78 18 109/65 (80) 94 Room Air  








Lab Results:





Results Past 24 Hours








Test


  10/4/17


06:11 Range/Units


 


 


White Blood Count 14.65 4.8-10.8  K/uL


 


Red Blood Count 3.81 4.7-6.1  M/uL


 


Hemoglobin 11.3 14.0-18.0  g/dL


 


Hematocrit 34.1 42-52  %


 


Mean Corpuscular Volume 89.5   fL


 


Mean Corpuscular Hemoglobin 29.7 25-34  pg


 


Mean Corpuscular Hemoglobin


Concent 33.1


  32-36  g/dl


 


 


RDW Standard Deviation 53.6 36.4-46.3  fL


 


RDW Coefficient of Variation 16.4 11.5-14.5  %


 


Platelet Count 229 130-400  K/uL


 


Mean Platelet Volume 10.7 7.4-10.4  fL


 


Prothrombin Time


  24.1


  9.0-12.0


SECONDS


 


Prothromb Time International


Ratio 2.2


  0.9-1.1

## 2017-10-04 NOTE — ORTHOPEDIC PROGRESS NOTE
Orthopedic Progress Note


Date of Service


Oct 4, 2017.





Subjective


Post OP Day:  3


Reports: feeling well


Additional Notes:


Mr. Francis reports his right ankle is doing well.  He actually says his left 

ankle bothers him more than his right.  He's been unable to put any weight on 

his left ankle with physical therapy.  He received 4.5 mg Coumadin last night 

and his INR is 2.2 this morning.





Objective


Examination of the left ankle: Patient continues to have tenderness over the 

lateral malleolus and ATFL and the left ankle.  He has pain with resisted 

dorsiflexion and plantarflexion as well as eversion.





Examination the right ankle reveals a splint to be fitting well.  Is able 

wiggle his toes and sensory intact to light touch over the toes.











  Date Time  Temp Pulse Resp B/P (MAP) Pulse Ox O2 Delivery O2 Flow Rate FiO2


 


10/4/17 07:18 36.5 71 16 104/67 (79) 97 Room Air  


 


10/3/17 23:25 36.6 73 18 109/58 (75) 95 Room Air  


 


10/3/17 23:15      Room Air  


 


10/3/17 17:15      Room Air  


 


10/3/17 14:59 36.4 78 18 109/65 (80) 94 Room Air  








Laboratory Results 24 Hours:











Test


  10/4/17


06:11


 


Hematocrit 34.1 % 


 


Hemoglobin 11.3 g/dL 


 


Prothromb Time International


Ratio 2.2 


 


 


Prothrombin Time 24.1 SECONDS 











Assessment & Plan


Assessment:


POD 3 - ORIF right ankle bimalleolar fracture.   Left ankle sprain vs. 

questionable stable fracture


Plan:


I ordered an ankle stirrup brace for his Left ankle for when he is transferring 

from OOB to chair.  NWB on the right lower extremity at all times


continue Ice/elevation as needed for pain/swelling


PT/OT


Pain medication as prescribed


Will continue to follow


Goal INR is 2.0.  Hold coumadin for today. I am concerned about his INR spiking 

and him bleeding into his surgical wound.  We do not want to return to the 

operating room to wash out a hematoma and place him at the risk of another 

anesthetic.


Patient is accepted to [facility] when deemed medically stable and prior 

auth for insurance obtained.


Care as per primary team.

## 2017-10-05 VITALS
HEART RATE: 64 BPM | TEMPERATURE: 97.52 F | DIASTOLIC BLOOD PRESSURE: 59 MMHG | OXYGEN SATURATION: 94 % | SYSTOLIC BLOOD PRESSURE: 94 MMHG

## 2017-10-05 VITALS
DIASTOLIC BLOOD PRESSURE: 67 MMHG | OXYGEN SATURATION: 95 % | HEART RATE: 62 BPM | SYSTOLIC BLOOD PRESSURE: 113 MMHG | TEMPERATURE: 97.88 F

## 2017-10-05 VITALS
HEART RATE: 59 BPM | OXYGEN SATURATION: 96 % | DIASTOLIC BLOOD PRESSURE: 59 MMHG | TEMPERATURE: 97.88 F | SYSTOLIC BLOOD PRESSURE: 98 MMHG

## 2017-10-05 LAB
ANION GAP SERPL CALC-SCNC: 3 MMOL/L (ref 3–11)
BUN SERPL-MCNC: 37 MG/DL (ref 7–18)
BUN/CREAT SERPL: 24.9 (ref 10–20)
CALCIUM SERPL-MCNC: 8.8 MG/DL (ref 8.5–10.1)
CHLORIDE SERPL-SCNC: 101 MMOL/L (ref 98–107)
CO2 SERPL-SCNC: 33 MMOL/L (ref 21–32)
CREAT CL PREDICTED SERPL C-G-VRATE: 41.3 ML/MIN
CREAT SERPL-MCNC: 1.5 MG/DL (ref 0.6–1.4)
EOSINOPHIL NFR BLD AUTO: 232 K/UL (ref 130–400)
GLUCOSE SERPL-MCNC: 94 MG/DL (ref 70–99)
HCT VFR BLD CALC: 34.6 % (ref 42–52)
INR PPP: 2.7 (ref 0.9–1.1)
MCH RBC QN AUTO: 29 PG (ref 25–34)
MCHC RBC AUTO-ENTMCNC: 32.1 G/DL (ref 32–36)
MCV RBC AUTO: 90.3 FL (ref 80–100)
PMV BLD AUTO: 10 FL (ref 7.4–10.4)
POTASSIUM SERPL-SCNC: 4 MMOL/L (ref 3.5–5.1)
PROTHROMBIN TIME: 30 SECONDS (ref 9–12)
RBC # BLD AUTO: 3.83 M/UL (ref 4.7–6.1)
SODIUM SERPL-SCNC: 137 MMOL/L (ref 136–145)
WBC # BLD AUTO: 13.2 K/UL (ref 4.8–10.8)

## 2017-10-05 RX ADMIN — ATORVASTATIN CALCIUM SCH MG: 10 TABLET, FILM COATED ORAL at 09:27

## 2017-10-05 RX ADMIN — HYDROCODONE BITARTRATE AND ACETAMINOPHEN PRN TAB: 5; 325 TABLET ORAL at 16:42

## 2017-10-05 RX ADMIN — LACTOBACILLUS TAB SCH TAB: TAB at 09:27

## 2017-10-05 RX ADMIN — HYDROCODONE BITARTRATE AND ACETAMINOPHEN PRN TAB: 5; 325 TABLET ORAL at 09:27

## 2017-10-05 RX ADMIN — PANTOPRAZOLE SCH MG: 40 TABLET, DELAYED RELEASE ORAL at 05:27

## 2017-10-05 RX ADMIN — AMIODARONE HYDROCHLORIDE SCH MG: 200 TABLET ORAL at 09:27

## 2017-10-05 RX ADMIN — TAMSULOSIN HYDROCHLORIDE SCH MG: 0.4 CAPSULE ORAL at 20:47

## 2017-10-05 RX ADMIN — LEVOTHYROXINE SODIUM SCH MCG: 75 TABLET ORAL at 05:27

## 2017-10-05 RX ADMIN — Medication SCH MG: at 09:27

## 2017-10-05 RX ADMIN — TRAMADOL HYDROCHLORIDE PRN MG: 50 TABLET, COATED ORAL at 19:04

## 2017-10-05 NOTE — ORTHOPEDIC PROGRESS NOTE
Orthopedic Progress Note


Date of Service


Oct 5, 2017.





Subjective


Post OP Day:  4


Reports: feeling well, Denies: chest pain, SOB, calf pain


Additional Notes:


Patients INR 2.7 this AM after holding coumadin last night.  He was fit with a 

stirrup ankle brace for the L ankle yesterday which he reports is fitting well.





Objective


splint C/D/I, capillary refill less than 2 sec.


L ankle stirrup brace in place.  NVI


R ankle: wiggles toes.  SILT over exposed toes.  able to do a SLR.











  Date Time  Temp Pulse Resp B/P (MAP) Pulse Ox O2 Delivery O2 Flow Rate FiO2


 


10/5/17 07:08 36.4 64 19 94/59 (71) 94 Room Air  


 


10/4/17 23:15      Room Air  


 


10/4/17 22:45 36.6 71 18 102/62 (75) 97 Room Air  


 


10/4/17 15:45      Room Air  


 


10/4/17 15:02 36.8 74 18 101/60 (74) 95 Room Air  








Laboratory Results 24 Hours:











Test


  10/5/17


06:20


 


Hematocrit 34.6 % 


 


Hemoglobin 11.1 g/dL 


 


Prothromb Time International


Ratio 2.7 


 


 


Prothrombin Time 30.0 SECONDS 











Assessment & Plan


Assessment:


POD 4 - ORIF right ankle bimalleolar fracture.   Left ankle sprain vs. 

questionable stable fracture.  Anticoagulated with coumadin


Plan:


Continue NWB RLE in splint.  Splint to remain in place until his 2 week 

followup.


Continue L ankle stirrup brace when he is transferring from OOB to chair.  NWB 

on the LLE except for transfers.  The stirrup brace may be removed when not 

transferring.


continue Ice/elevation as needed for pain/swelling


PT/OT


Pain medication as prescribed


Will continue to follow


Goal INR is 2.0.  Hold coumadin again today. I am concerned about his INR 

spiking and him bleeding into his surgical wound.  We do not want to return to 

the operating room to wash out a hematoma and place him at the risk of another 

anesthetic.


Cleared from ortho standpoint to discharge to nursing facility when appropriate 

per primary team


Care as per primary team.


Follow-up Dr. Brito in 2 weeks.

## 2017-10-05 NOTE — CONSULTANT RECOMMENDATIONS
Consultant Recommendations


Date of Service


Oct 5, 2017.





Consultant Recommendations


Orthopedic Recommendations





Continue NWB RLE in splint.  Splint to remain in place until his 2 week 

followup.


Continue L ankle stirrup brace when he is transferring from OOB to chair.  NWB 

on the LLE except for transfers.  The stirrup brace may be removed when not 

transferring.


continue Ice/elevation as needed for pain/swelling


PT/OT


Pain medication as prescribed


Will continue to follow


Goal INR is 2.0.  Hold coumadin again today. I am concerned about his INR 

spiking and him bleeding into his surgical wound.  We do not want to return to 

the operating room to wash out a hematoma and place him at the risk of another 

anesthetic.


Cleared from ortho standpoint to discharge to nursing facility when appropriate 

per primary team


Care as per primary team.


Follow-up Dr. Brito in 2 weeks

## 2017-10-06 VITALS
TEMPERATURE: 97.88 F | SYSTOLIC BLOOD PRESSURE: 112 MMHG | OXYGEN SATURATION: 94 % | HEART RATE: 85 BPM | DIASTOLIC BLOOD PRESSURE: 67 MMHG

## 2017-10-06 VITALS
SYSTOLIC BLOOD PRESSURE: 112 MMHG | DIASTOLIC BLOOD PRESSURE: 67 MMHG | OXYGEN SATURATION: 94 % | HEART RATE: 85 BPM | TEMPERATURE: 97.88 F

## 2017-10-06 LAB
EOSINOPHIL NFR BLD AUTO: 264 K/UL (ref 130–400)
HCT VFR BLD CALC: 35.5 % (ref 42–52)
INR PPP: 2.8 (ref 0.9–1.1)
MCH RBC QN AUTO: 28.9 PG (ref 25–34)
MCHC RBC AUTO-ENTMCNC: 32.1 G/DL (ref 32–36)
MCV RBC AUTO: 89.9 FL (ref 80–100)
PMV BLD AUTO: 10.4 FL (ref 7.4–10.4)
PROTHROMBIN TIME: 31.5 SECONDS (ref 9–12)
RBC # BLD AUTO: 3.95 M/UL (ref 4.7–6.1)
WBC # BLD AUTO: 12.58 K/UL (ref 4.8–10.8)

## 2017-10-06 RX ADMIN — HYDROCODONE BITARTRATE AND ACETAMINOPHEN PRN TAB: 5; 325 TABLET ORAL at 15:14

## 2017-10-06 RX ADMIN — Medication SCH MG: at 09:50

## 2017-10-06 RX ADMIN — LACTOBACILLUS TAB SCH TAB: TAB at 09:50

## 2017-10-06 RX ADMIN — PANTOPRAZOLE SCH MG: 40 TABLET, DELAYED RELEASE ORAL at 05:58

## 2017-10-06 RX ADMIN — TRAMADOL HYDROCHLORIDE PRN MG: 50 TABLET, COATED ORAL at 11:54

## 2017-10-06 RX ADMIN — TRAMADOL HYDROCHLORIDE PRN MG: 50 TABLET, COATED ORAL at 06:02

## 2017-10-06 RX ADMIN — LEVOTHYROXINE SODIUM SCH MCG: 75 TABLET ORAL at 05:58

## 2017-10-06 RX ADMIN — AMIODARONE HYDROCHLORIDE SCH MG: 200 TABLET ORAL at 09:50

## 2017-10-06 RX ADMIN — ATORVASTATIN CALCIUM SCH MG: 10 TABLET, FILM COATED ORAL at 09:50

## 2017-10-06 NOTE — PROGRESS NOTE
Internal Med Progress Note


Date of Service:


Oct 6, 2017.


Provider Documentation:





SUBJECTIVE:





The patient was seen and examined 


Much better today OOB in a chair 


Pain is reasonably controlled 











OBJECTIVE:





Vital Signs-as noted below





Exam:


General-No distress at rest


OOB in  a Chair


Eyes-normal


ENT-normal


Neck-supple


Lungs-clear to auscultate bilaterally 


Heart-Regular,no murmur appreciated


Abdomen-Benign,no masses,bowel sound present 


Extremities-Right Ankle is bandaged in Cast  


and Left ankle has brace 


Neuro-AAOx3


Generally weak and lethargic





Lab data as noted below.


ASSESSMENT & PLAN:








This is an 86 year old male with a PMH of paroxysmal atrial fibrillation on 

Coumadin, HTN, Hypothyroidism, CKD stage 3, Essential tremor, with mechanical 

fall and R posterior malleolar fracture





R Posterior Malleolar Fracture


Patient presented status post mechanical fall


Fracture R posterior malleolar fracture


This was also dislocated and subsequently reduced by the ER physician


Appreciate Ortho input 


S/P ORIF POD # 5


Doing well following surgery 


PT/OT -requested .needs to participate more in PT 


Clinically a little better today 


Will go to [facility] today 








Paroxysmal Atrial Fibrillation


Currently in NSR


Continue Amiodarone


Coumadin was on Hold for today 





Leukocytosis


Secondary to use of Steroid


Will monitor -improving


Doubt any infective process but has inflammation 





Hypothyroidism


continue Synthroid





CKD stage 3


Avoid nephrotoxic agents when able


Gentle hydration


Remains stable at 1.5 





DVT ppx


On Coumadin 


INR 2.8 today -Coumadin on Hold 





FULL CODE





Disposition


Likely discharge tomorrow


Vital Signs:











  Date Time  Temp Pulse Resp B/P (MAP) Pulse Ox O2 Delivery O2 Flow Rate FiO2


 


10/6/17 08:20      Room Air  


 


10/6/17 07:02 36.6 85 18 112/67 (82) 94 Room Air  


 


10/5/17 23:30      Room Air  


 


10/5/17 23:04 36.6 62 16 113/67 (82) 95 Room Air  


 


10/5/17 16:10      Room Air  


 


10/5/17 15:06 36.6 59 18 98/59 (72) 96 Room Air  








Lab Results:





Results Past 24 Hours








Test


  10/6/17


05:25 10/6/17


05:26 Range/Units


 


 


Prothrombin Time


  31.5


  


  9.0-12.0


SECONDS


 


Prothromb Time International


Ratio 2.8


  


  0.9-1.1  


 


 


White Blood Count  12.58 4.8-10.8  K/uL


 


Red Blood Count  3.95 4.7-6.1  M/uL


 


Hemoglobin  11.4 14.0-18.0  g/dL


 


Hematocrit  35.5 42-52  %


 


Mean Corpuscular Volume  89.9   fL


 


Mean Corpuscular Hemoglobin  28.9 25-34  pg


 


Mean Corpuscular Hemoglobin


Concent 


  32.1


  32-36  g/dl


 


 


RDW Standard Deviation  53.3 36.4-46.3  fL


 


RDW Coefficient of Variation  16.3 11.5-14.5  %


 


Platelet Count  264 130-400  K/uL


 


Mean Platelet Volume  10.4 7.4-10.4  fL

## 2017-10-06 NOTE — DISCHARGE INSTRUCTIONS
Discharge Instructions


Date of Service


Oct 6, 2017.





Admission


Reason for Admission:  Ankle Dislocation; Fall





Discharge


Discharge Diagnosis / Problem:  Right Ankle Bimalleolar fracture,s/p ORIF,Left 

ankle sprain





Discharge Goals


Goal(s):  Prevent Disease Progression





Activity Recommendations


Activity Level:  Assistance Required


Therapies:  Physical Therapy, Occupational Therapy


Weightbearing Status:  Left non-weightbearing (As per Ortho recommendation)





.





Additional Information


Patient informed of condition:  Yes


Advance Directives:  No


DNR:  No


Level of Care:  Skilled


Communicable Disease:  No


Prognosis:  Stable


Oxygen at (LPM):  2 liters /min via NC as needed


Johnson Catheter:  No





Instructions / Follow-Up


Instructions / Follow-Up


Please make an appointment with your PCP in 1 week. Ortho appointment in 2 weeks

-as advised





Current Hospital Diet


Patient's current hospital diet: Regular Diet





Discharge Diet


Recommended Diet:  Regular Diet





Procedures


Procedures Performed:  


Open reduction internal fixation right ankle fracture





Pending Studies


Studies pending at discharge:  no





Medical Emergencies








.


Who to Call and When:





Medical Emergencies:  If at any time you feel your situation is an emergency, 

please call 911 immediately.





.





Non-Emergent Contact


Non-Emergency issues call your:  Primary Care Provider





.





Past History


Medical & Surgical History:  


(1) Hyperlipidemia


(2) Hypothyroidism


(3) Placement of deep brain stimulators


(4) Atrial fibrillation with RVR


(5) Fibula fracture


(6) Supratherapeutic INR


(7) Fall


(8) Ankle dislocation


.








"Provider Documentation" section prepared by Augustine Sellers.








.





Consultant Recommendations


Consultant Recommendations:


Orthopedic Recommendations





Continue NWB RLE in splint.  Splint to remain in place until his 2 week 

followup.


Continue L ankle stirrup brace when he is transferring from OOB to chair.  NWB 

on the LLE except for transfers.  The stirrup brace may be removed when not 

transferring.


continue Ice/elevation as needed for pain/swelling


PT/OT


Pain medication as prescribed


Will continue to follow


Goal INR is 2.0.  Hold coumadin again today. I am concerned about his INR 

spiking and him bleeding into his surgical wound.  We do not want to return to 

the operating room to wash out a hematoma and place him at the risk of another 

anesthetic.


Cleared from ortho standpoint to discharge to nursing facility when appropriate 

per primary team


Care as per primary team.


Follow-up Dr. Brito in 2 weeks





Core Measure Problem


Core Measures:  None

## 2017-10-06 NOTE — ORTHOPEDIC PROGRESS NOTE
Orthopedic Progress Note


Date of Service


Oct 6, 2017.





Subjective


Post OP Day:  5


Reports: feeling well, pain controlled w PO medications, Denies: chest pain, SOB





Objective


splint C/D/I, capillary refill less than 2 sec., dressing C/D/I, toes mobile











  Date Time  Temp Pulse Resp B/P (MAP) Pulse Ox O2 Delivery O2 Flow Rate FiO2


 


10/6/17 07:02 36.6 85 18 112/67 (82) 94 Room Air  


 


10/5/17 23:30      Room Air  


 


10/5/17 23:04 36.6 62 16 113/67 (82) 95 Room Air  


 


10/5/17 16:10      Room Air  


 


10/5/17 15:06 36.6 59 18 98/59 (72) 96 Room Air  


 


10/5/17 07:30      Room Air  








Laboratory Results 24 Hours:











Test


  10/6/17


05:25 10/6/17


05:26


 


Prothromb Time International


Ratio 2.8 


  


 


 


Prothrombin Time 31.5 SECONDS  


 


Hematocrit  35.5 % 


 


Hemoglobin  11.4 g/dL 











Assessment & Plan


Assessment:


POD 5 - ORIF right ankle bimalleolar fracture.   Left ankle sprain vs. 

questionable stable fracture.  Anticoagulated with coumadin


Plan:


Continue NWB RLE in splint.  Splint to remain in place until his 2 week 

followup.


Continue L ankle stirrup brace when he is transferring from OOB to chair.  NWB 

on the LLE except for transfers.  The stirrup brace may be removed when not 

transferring.


continue Ice/elevation as needed for pain/swelling


PT/OT


Pain medication as prescribed


Will continue to follow


Goal INR is 2.0.  Hold coumadin again today. I am concerned about his INR 

spiking and him bleeding into his surgical wound.  We do not want to return to 

the operating room to wash out a hematoma and place him at the risk of another 

anesthetic.


Cleared from ortho standpoint to discharge to nursing facility when appropriate 

per primary team


Care as per primary team.


Follow-up Dr. Brito in 2 weeks.

## 2017-10-07 NOTE — DISCHARGE SUMMARY
Discharge Summary


Date of Service


Oct 7, 2017.





Discharge Summary


Admission Date:


Sep 30, 2017 at 13:42


Discharge Date:  Oct 6, 2017


Discharge Disposition:  Skilled nursing facility


Principal Diagnosis:


Right Ankle Bimalleolar fracture,s/p ORIF,Left ankle sprain


Secondary Diagnoses/Problems:


Please see H&P and Hospital Progress note


Consultations:


Ortho





Medication Reconciliation


New Medications:  


Hydrocodone/Acetaminophen 5MG/325MG (Norco 5MG/325MG)  Tab


1-2 TAB PO Q6H PRN for Pain for 7 Days, #30 TAB


PRN PAIN


 


Continued Medications:  


Amiodarone Hcl (Cordarone) 200 Mg Tab


200 MG PO DAILY, TAB





Aspirin (Aspirin EC Low Dose) 81 Mg Ectab


81 MG PO DAILY, #30 2 Refills





Atorvastatin (Lipitor) 10 Mg Tab


10 MG PO DAILY, TAB





Levothyroxine Sodium (Synthroid) 75 Mcg Tab


75 MCG PO DAILY, TAB





Omeprazole (Prilosec) 40 Mg Cap


40 MG PO DAILYBB, CAP





Polyethylene (Miralax) 17 Gm Pow


17 GM PO DAILY PRN for Constipation for 30 Days, 1 Refill





Probiotic Product (Probiotic) 1 Cap Cap


1 CAP PO DAILY





Tamsulosin Hcl (Flomax) 0.4 Mg Cap


0.4 MG PO HS, CAP





Tramadol (Ultram) 50 Mg Tab


25 MG PO Q6H PRN for Pain, #30 TAB 1 Refill





Warfarin Sod (Jantoven) 3 Mg Tab


3 MG PO UD, TAB


Do not take any on 10/6,10/7 check INR on Sunday and start coumadin


 (if appropriate) to keep the INR around 2.0 as per Ortho.


 


Discontinued Medications:  


Cephalexin Monohydrate (Keflex) 500 Mg Cap


500 MG PO TID, #15 CAP





Prednisone (Prednisone) 20 Mg Tab


10 MG PO DAILY, TAB


TAKE 10MG DAILY FOR THREE DAYS








Admission Information


HPI (per Admitting provider):


This is an 86 year old male with a PMH of paroxysmal atrial fibrillation on 

Coumadin, HTN, hypothyroidism, CKD stage 3, essential tremor, with a recent 

injury of the L fibula; L lateral malleolar area - he was at home with non-

weight bearing status; using prednisone for this. This morning, patient states 

he was trying to get out of the bed and he fell. He states his R ankle was very 

painful and swollen. He presented to he ER and found to have fracture and 

dislocation of the R ankle. This was reduced and repeat X-ray suggests a 

posterior malleolar fracture. He states that the pain is much improved after 

morphine and fentanyl. He states he feels better now. Denies chest pain/

shortness of breath. Denies fevers/chills.





States he had some diarrhea with the Keflex that he was prescribed as an 

outpatient.





Past Medical/Surgical History


Medical Problems:


(1) Carotid artery surgery


Status: Resolved  





(2) Gastroesophageal reflux disease


Status: Chronic  





(3) Hyperlipidemia


Status: Chronic  





(4) Hypothyroidism


Status: Chronic  





(5) Placement of deep brain stimulators


Status: Resolved  











Social History


Smoking Status:  Never Smoker


Marital Status:  


Housing status:  lives with family


Occupational Status:  retired





Immunizations


History of Influenza Vaccine:  Yes


History of Tetanus Vaccine?:  No


History of Pneumococcal:  Yes


History of Hepatitis B Vaccine:  No





Multi-Drug Resistant Organisms


History of MDRO:  No





Allergies


Coded Allergies:  


     Finasteride (Unverified  Adverse Reaction, Severe, RASH, 9/30/17)





Home Medications


Scheduled


Amiodarone Hcl (Cordarone), 200 MG PO DAILY


Aspirin (Aspirin EC Low Dose), 81 MG PO DAILY


Atorvastatin (Lipitor), 10 MG PO DAILY


Cephalexin Monohydrate (Keflex), 500 MG PO TID


Levothyroxine Sodium (Synthroid), 75 MCG PO DAILY


Omeprazole (Prilosec), 40 MG PO DAILYBB


Prednisone (Prednisone), 10 MG PO DAILY


Probiotic Product (Probiotic), 1 CAP PO DAILY


Tamsulosin Hcl (Flomax), 0.4 MG PO HS


Warfarin Sod (Jantoven), 4.5 MG PO UD





Scheduled PRN


Polyethylene (Miralax), 17 GM PO DAILY PRN for Constipation


Tramadol (Ultram), 25 MG PO Q6H PRN for Pain





Review of Systems


Constitutional:  No fever, No chills


ENT:  No hearing loss


Respiratory:  No cough, No sputum, No wheezing, No shortness of breath, No 

dyspnea on exertion, No dyspnea at rest, No hemoptysis


Cardiovascular:  No chest pain, No palpitations


Abdomen:  + diarrhea, No pain, No nausea, No vomiting, No constipation, No GI 

bleeding


Musculoskeletal:  + joint pain (R ankle), + swelling, No muscle pain, No calf 

pain


Genitourinary - Male:  No hematuria, No dysuria


Neurologic:  + balance problems, No memory loss, No paralysis, No weakness


Psychiatric:  No depression symptoms


Hematologic / Lymphatic:  No abnormal bleeding/bruising


Integumentary:  No rash


Allergic / Immunologic:  No environmental allergies, No seasonal allergies





 Physical Ex - H&P 


Physical Exam


Vital Signs











  Date Time  Temp Pulse Resp B/P (MAP) Pulse Ox O2 Delivery O2 Flow Rate FiO2


 


9/30/17 13:00  53 14 127/81 99 Room Air  


 


9/30/17 12:22  53      


 


9/30/17 11:57  50  106/56 93 Room Air  


 


9/30/17 11:09 36.4 92 17 106/62 98 Room Air  








General Appearance:  no apparent distress, + pertinent finding (+resting tremor)


Head:  normocephalic, atraumatic


Eyes:  normal inspection


ENT:  hearing grossly normal


Respiratory/Chest:  chest non-tender, lungs clear, normal breath sounds, no 

respiratory distress, no accessory muscle use


Cardiovascular:  regular rate, rhythm, no edema, no gallop, no JVD, no murmur, 

normal peripheral pulses


Abdomen/GI:  normal bowel sounds, non tender, soft


Back:  no muscle spasm


Extremities/Musculoskelatal:  + pertinent finding (R ankle is wrapped currently

, painful to any movement; L ankle is doing better, but painful with ROM)


Neurologic/Psych:  CNs II-XII nml as tested, no motor/sensory deficits, alert, 

normal mood/affect, oriented x 3, + pertinent finding (+tremor)


Skin:  normal color


Lymphatic:  no adenopathy





 Diagnostics - H&P 


Diagnostics


Laboratory Results





Results Past 24 Hours








Test


  9/30/17


11:34 Range/Units


 


 


White Blood Count 16.64 4.8-10.8  K/uL


 


Red Blood Count 4.77 4.7-6.1  M/uL


 


Hemoglobin 13.4 14.0-18.0  g/dL


 


Hematocrit 42.7 42-52  %


 


Mean Corpuscular Volume 89.5   fL


 


Mean Corpuscular Hemoglobin 28.1 25-34  pg


 


Mean Corpuscular Hemoglobin


Concent 31.4


  32-36  g/dl


 


 


Platelet Count 278 130-400  K/uL


 


Mean Platelet Volume 10.4 7.4-10.4  fL


 


Neutrophils (%) (Auto) 84.6  %


 


Lymphocytes (%) (Auto) 8.6  %


 


Monocytes (%) (Auto) 5.9  %


 


Eosinophils (%) (Auto) 0.4  %


 


Basophils (%) (Auto) 0.0  %


 


Neutrophils # (Auto) 14.07 1.4-6.5  K/uL


 


Lymphocytes # (Auto) 1.43 1.2-3.4  K/uL


 


Monocytes # (Auto) 0.99 0.11-0.59  K/uL


 


Eosinophils # (Auto) 0.06 0-0.5  K/uL


 


Basophils # (Auto) 0.00 0-0.2  K/uL


 


RDW Standard Deviation 53.1 36.4-46.3  fL


 


RDW Coefficient of Variation 16.2 11.5-14.5  %


 


Immature Granulocyte % (Auto) 0.5  %


 


Immature Granulocyte # (Auto) 0.09 0.00-0.02  K/uL


 


Prothrombin Time


  50.3


  9.0-12.0


SECONDS


 


Prothromb Time International


Ratio 4.4


  0.9-1.1  


 


 


Sodium Level 135 136-145  mmol/L


 


Potassium Level 3.6 3.5-5.1  mmol/L


 


Chloride Level 98   mmol/L


 


Carbon Dioxide Level 29 21-32  mmol/L


 


Anion Gap 8.0 3-11  mmol/L


 


Blood Urea Nitrogen 53 7-18  mg/dl


 


Creatinine


  1.90


  0.60-1.40


mg/dl


 


Est Creatinine Clear Calc


Drug Dose 32.6


   ml/min


 


 


Estimated GFR (


American) 36.2


   


 


 


Estimated GFR (Non-


American 31.2


   


 


 


BUN/Creatinine Ratio 28.1 10-20  


 


Random Glucose 115 70-99  mg/dl


 


Calcium Level 8.5 8.5-10.1  mg/dl


 


Total Bilirubin 0.7 0.2-1  mg/dl


 


Aspartate Amino Transf


(AST/SGOT) 18


  15-37  U/L


 


 


Alanine Aminotransferase


(ALT/SGPT) 27


  12-78  U/L


 


 


Alkaline Phosphatase 83   U/L


 


Total Protein 6.4 6.4-8.2  gm/dl


 


Albumin 3.1 3.4-5.0  gm/dl


 


Globulin 3.2 2.5-4.0  gm/dl


 


Albumin/Globulin Ratio 1.0 0.9-2  











Diagnostic Radiology


R ANKLE MIN 3 VIEWS ROUTINE





CLINICAL HISTORY: 86 years-old Male presenting with post reduction. 





TECHNIQUE: Frontal, oblique, and lateral views of the right ankle were obtained.








COMPARISON:  Plain radiographs of the right ankle performed earlier the same


day.





FINDINGS:


There has been interval reduction of the of the ankle mortise. The medial clear


space now measures 5 mm, decreased from 7 mm. No significant diastases of the


anterior aspect of the ankle mortise. Again demonstrated is the obliquely


oriented fracture of the fibula at the level of the syndesmosis. There is a


cortical discontinuity of the posterior aspect of the tibial plafond suggesting


posterior malleolar fracture. An overlying fiberglass splint partially tears


underlying osseous detail.





IMPRESSION:


Suggestion of posterior malleolus fracture, which is nondisplaced. Interval


reduction of the ankle mortise dislocation. Unchanged Brunner B fracture of the


fibula.








HEAD WITHOUT CONTRAST (CT)





CLINICAL HISTORY: 86 years-old Male presenting with trauma, fell when getting


out of of bed. 





TECHNIQUE: Multidetector CT imaging of the head was performed without the use of


intravenous contrast. IV contrast: None. A dose lowering technique was used


consistent with the principles of ALARA (as low as reasonably achievable). 





COMPARISON:  4/27/2009.





CT DOSE (mGy.cm): The estimated cumulative dose is 1221.96 mGy.cm.





FINDINGS: 





 topogram: 2 stimulator leads noted in the cranium.





Proportional ventricular and sulcal prominence, likely age-related parenchymal


volume loss. Prominence of CSF space along the left posterior fossa may indicate


an arachnoid cyst. Periventricular and subcortical white matter hypoattenuation,


nonspecific but likely indicative of chronic small vessel ischemic change. 2


transfrontal deep brain stimulator leads terminate in the thalami. No mass


effect or midline shift. No hemorrhage or acute territorial infarct. No


extra-axial fluid collection. Paranasal sinuses and mastoid air cells clear.


Calvarium intact other than the rupa holes for the stimulator leads.    





IMPRESSION:


1.  No acute intracranial pathology. 





2.  Postoperative changes stable from prior exam.





PELVIS 1 OR 2 VIEW ROUTINE





CLINICAL HISTORY: 86 years-old Male presenting with trauma, distracting injury. 





TECHNIQUE: Single frontal view of the pelvis was obtained. 





COMPARISON:  None.





FINDINGS:


Bony pelvis intact. Pubic symphysis and sacroiliac joints congruent. Hip joints


congruent. No gross evidence of femoral neck fracture. Unremarkable lower lumbar


spine. Nonobstructive bowel gas pattern.





IMPRESSION:


No acute osseous injury of the pelvis.





CERVICAL SPINE W/O





CLINICAL HISTORY: 86 years-old Male presenting with trauma, fell getting out of


bed. 





TECHNIQUE: Multidetector CT of the cervical spine was performed without the use


of intravenous contrast. IV contrast: None. A dose lowering technique was used


consistent with the principles of ALARA (as low as reasonably achievable).





COMPARISON: Plain radiographs of the cervical spine from 2016.





CT DOSE (mGy.cm): The estimated cumulative dose is 1221.96.





FINDINGS:





 topogram: Unremarkable.





Normal cervical lordosis. Mild vertebral body height loss noted at C4 and C5.


Significant intervertebral disc height loss at C5-6 and C6-7. Multilevel


degenerative changes with disc osteophyte complexes from C4-5 through C6-7.


Facet arthropathy also noted at multiple levels. No acute fracture or


malalignment. Skull base intact. Osseous neural foraminal narrowing noted at


C4-5 and to a lesser degree at C5-6 bilaterally. No osseous spinal canal


narrowing. Paraspinal soft tissues demonstrate the stimulator leads and


atherosclerosis but are otherwise normal. Lung apices with mild interlobular


septal prominence but essentially clear. Old calcified granuloma noted at the


right apex.





IMPRESSION:


1.  No acute osseous injury of the cervical spine.





2.  Multilevel degenerative changes as above.





EKG


Sinus Bradycardia at 58 bpm





 Impression - H&P 


Impression


Assessment and Plan


This is an 86 year old male with a PMH of paroxysmal atrial fibrillation on 

Coumadin, HTN, hypothyroidism, CKD stage 3, essential tremor, with mechanical 

fall and R posterior malleolar fracture





R Posterior Malleolar Fracture


L Ankle Pain


patient presented status post mechanical fall


fracture R posterior malleolar fracture


this was also dislocated and subsequently reduced by the ER physician


orthopedic consultation placed


currently on bedrest


morphine and tramadol PRN





Paroxysmal Atrial Fibrillation


currently in NSR


continue Amiodarone


hold Coumadin, due to elevated INR





Leukocytosis


secondary to prednisone use


hold steroids, and hold abx. - no sign of infection currently





Hypothyroidism


continue Synthroid





CKD stage 3


avoid nephrotoxic agents when able


gentle hydration





DVT ppx


Coumadin





FULL CODE





VTE Prophylaxis


VTE Risk Assessment Done? Y/N:  Yes


Risk Level:  High


Given or contraindicated:  Warfarin (Coumadin)


Physical Exam (per Admitting):  


   General Appearance:  no apparent distress, + pertinent finding (+resting 

tremor)


   Head:  normocephalic, atraumatic


   Eyes:  normal inspection


   ENT:  hearing grossly normal


   Respiratory/Chest:  chest non-tender, lungs clear, normal breath sounds, no 

respiratory distress, no accessory muscle use


   Cardiovascular:  regular rate, rhythm, no edema, no gallop, no JVD, no murmur

, normal peripheral pulses


   Abdomen/GI:  normal bowel sounds, non tender, soft


   Back:  no muscle spasm


   Extremities/Musculoskelatal:  + pertinent finding (R ankle is wrapped 

currently, painful to any movement; L ankle is doing better, but painful with 

ROM)


   Neurologic/Psych:  CNs II-XII nml as tested, no motor/sensory deficits, alert

, normal mood/affect, oriented x 3, + pertinent finding (+tremor)


   Skin:  normal color


   Lymphatic:  no adenopathy





Hospital Course








This is an 86 year old male with a PMH of paroxysmal atrial fibrillation on 

Coumadin, HTN, Hypothyroidism, CKD stage 3, Essential tremor, with mechanical 

fall and R posterior malleolar fracture





R Posterior Malleolar Fracture


Patient presented status post mechanical fall


Fracture R posterior malleolar fracture


This was also dislocated and subsequently reduced by the ER physician


Appreciate Ortho input 


S/P ORIF POD # 5


Doing well following surgery 


PT/OT -requested .needs to participate more in PT 


Clinically a little better today 


Will go to [facility] today 








Paroxysmal Atrial Fibrillation


Currently in NSR


Continue Amiodarone


Coumadin was on Hold for today 





Leukocytosis


Secondary to use of Steroid


Will monitor -improving


Doubt any infective process but has inflammation 





Hypothyroidism


continue Synthroid





CKD stage 3


Avoid nephrotoxic agents when able


Gentle hydration


Remains stable at 1.5 





DVT ppx


On Coumadin 


INR 2.8 today -Coumadin on Hold 





FULL CODE





Disposition


Likely discharge tomorrow


Total time spent on discharge = 40 minutes


This includes examination of the patient, discharge planning, medication 

reconciliation, and communication with other providers.





Discharge Instructions


Date of Service


Oct 6, 2017.





Admission


Reason for Admission:  Ankle Dislocation; Fall





Discharge


Discharge Diagnosis / Problem:  Right Ankle Bimalleolar fracture,s/p ORIF,Left 

ankle sprain





Discharge Goals


Goal(s):  Prevent Disease Progression





Activity Recommendations


Activity Level:  Assistance Required


Therapies:  Physical Therapy, Occupational Therapy


Weightbearing Status:  Left non-weightbearing (As per Ortho recommendation)





.





Additional Information


Patient informed of condition:  Yes


Advance Directives:  No


DNR:  No


Level of Care:  Skilled


Communicable Disease:  No


Prognosis:  Stable


Oxygen at (LPM):  2 liters /min via NC as needed


Johnson Catheter:  No





Instructions / Follow-Up


Instructions / Follow-Up


Please make an appointment with your PCP in 1 week. Ortho appointment in 2 weeks

-as advised





Current Hospital Diet


Patient's current hospital diet: Regular Diet





Discharge Diet


Recommended Diet:  Regular Diet





Procedures


Procedures Performed:  


Open reduction internal fixation right ankle fracture





Pending Studies


Studies pending at discharge:  no





Medical Emergencies








.


Who to Call and When:





Medical Emergencies:  If at any time you feel your situation is an emergency, 

please call 911 immediately.





.





Non-Emergent Contact


Non-Emergency issues call your:  Primary Care Provider





.





Past History


Medical & Surgical History:  


(1) Hyperlipidemia


(2) Hypothyroidism


(3) Placement of deep brain stimulators


(4) Atrial fibrillation with RVR


(5) Fibula fracture


(6) Supratherapeutic INR


(7) Fall


(8) Ankle dislocation


.








"Provider Documentation" section prepared by Augustine Sellers.








.





Consultant Recommendations


Consultant Recommendations:


Orthopedic Recommendations





Continue NWB RLE in splint.  Splint to remain in place until his 2 week 

followup.


Continue L ankle stirrup brace when he is transferring from OOB to chair.  NWB 

on the LLE except for transfers.  The stirrup brace may be removed when not 

transferring.


continue Ice/elevation as needed for pain/swelling


PT/OT


Pain medication as prescribed


Will continue to follow


Goal INR is 2.0.  Hold coumadin again today. I am concerned about his INR 

spiking and him bleeding into his surgical wound.  We do not want to return to 

the operating room to wash out a hematoma and place him at the risk of another 

anesthetic.


Cleared from ortho standpoint to discharge to nursing facility when appropriate 

per primary team


Care as per primary team.


Follow-up Dr. Brito in 2 weeks





Core Measure Problem


Core Measures:  None











<Electronically signed by Augustine Sellers M.D.>





  Signed:  10/06/17 5236





Additional Copies To


Dilan Rucker MD

## 2017-10-09 NOTE — ORTHOPEDIC CONSULTATION
Orthopedic Consultation


Date of Consultation:


Oct 9, 2017.


Attending Physician:


Augustine Sellers M.D.


History of Present Illness


 History & Physical Pre-Op


 


Patient Name: Chip Francis JR Unit Number: [id number]


YOB: 1931 Patient Status: Discharged Inpatient


Attending Doctor: Augustine Sellers M.D. Account Number: [id number]


   


 H&P: Limited ([facility]) 


History & Physical


Date


Sep 30, 2017.





Chief Complaint


Right ankle pain





History of Present Illness


The patient is a 86 year old male with complaints of pain in his bilateral 

ankles.  He sustained 3 falls in the last 2 weeks.  He says there are more like 

collapsing.  He had x-rays done of his left ankle at [facility] which showed a 

nondisplaced avulsion fracture per report.  We don't have those x-rays here to 

review.  However he was having trouble getting around in his left ankle.  Than 

this morning getting out of bed he still fell and injured his right ankle.  He 

is brought to the emergency room where x-rays revealed a posterior fracture 

dislocation of the ankle.  The fracture was reduced and splinted and he was 

admitted to the floor by Dr. Quiroga.  Orthopedics was consult for management 

recommendations.





Mr. Francis states that the pain is throughout the ankle.  He denies any numbness 

or tingling.  He lives with his spouse independently in a house.  His 2 

daughters are with him today.





Past Medical/Surgical History


Medical Problems:


(1) Atrial fibrillation with RVR


(2) Carotid artery surgery


(3) Gastroesophageal reflux disease


(4) Hyperlipidemia


(5) Hypotension


(6) Hypothyroidism


(7) Placement of deep brain stimulators








Allergies


Coded Allergies:  


     Finasteride (Unverified  Adverse Reaction, Severe, RASH, 9/30/17)





Home Medications


Scheduled


Amiodarone Hcl (Cordarone), 200 MG PO DAILY


Aspirin (Aspirin EC Low Dose), 81 MG PO DAILY


Atorvastatin (Lipitor), 10 MG PO DAILY


Cephalexin Monohydrate (Keflex), 500 MG PO TID


Levothyroxine Sodium (Synthroid), 75 MCG PO DAILY


Omeprazole (Prilosec), 40 MG PO DAILYBB


Prednisone (Prednisone), 10 MG PO DAILY


Probiotic Product (Probiotic), 1 CAP PO DAILY


Tamsulosin Hcl (Flomax), 0.4 MG PO HS


Warfarin Sod (Jantoven), 4.5 MG PO UD





Scheduled PRN


Polyethylene (Miralax), 17 GM PO DAILY PRN for Constipation


Tramadol (Ultram), 25 MG PO Q6H PRN for Pain





Physical Examination


Extremities:  + pertinent finding (examination of bilateral ankles reveals his 

right ankle to be in a splint.  He is able to wiggle his toes.  He is sensory 

intact to light touch over the toes.  The splint was not taken down since he 

had a fracture dislocation.  Examination of the left ankle reveals bruising 

over the distal fibula with tenderness over the ATFL CFL and the lateral 

malleolus.  He is able to fire EHL FHL tib and gastrocsoleus E has pain with 

resisted strength testing of the left ankle.  He is sensory intact to light 

touch throughout.The patient also has a central tremor of his bilateral upper 

extremities.)


Neurologic/Psych:  no motor/sensory deficits, alert


Addiitonal Comments:


Results reviewed right ankle x-rays done today pre-and post reduction show a 

distal fibula fracture with posterior displacement as well as a posterior 

malleolus fracture with posterior dislocation of the talus on the tibia.  Post 

reduction films demonstrate the ankle to be relocated in the joint.





Diagnosis


Right ankle flat fracture dislocation and subacute left ankle fracture 

reportedly nondisplaced avulsion fracture.  These are in the setting of a 

patient with history of 3 falls in the last 2 weeks who lives with his wife.





Plan of Treatment


I discussed the diagnoses with the patient and his 2 daughters.  I recommend 

surgical intervention for the right ankle fracture to improve the alignment and 

reduce his risk of posttraumatic arthritis as well as optimize his function.  

However there are risks with surgery including but not limited to anesthetic 

related risks, bleeding particularly in light of the fact that he is on 

Coumadin with an INR for today.  Other risks include infection and wound 

complications numbness stiffness and continued pain.  Postoperatively his can 

be a challenge mobilizing him because of his broken left ankle.  His skin is 

likely need to be in a wheelchair for at least a couple weeks afterwards.  For 

this reason he is going to need to go to a nursing facility once he leaves the 

hospital.  He has received IV vitamin K to reverse his INR.  It's going to be 

checked in the morning.  He is then placed on the surgery schedule for 9:30.  I 

will allow him to eat tonight and then be nothing by mouth after midnight.  

Informed consent was signed.  His surgical site was marked.





Past Medical/Surgical History


Medical Problems:


(1) NEGAR (acute kidney injury)


Status: Acute  





(2) Ankle dislocation


Status: Acute  





(3) Dehydration


Status: Acute  





(4) Fall


Status: Acute  





(5) Fibula fracture


Status: Acute  





(6) Supratherapeutic INR


Status: Acute  











Social History


Smoking Status:  Never Smoker


Marital Status:  


Housing Status:  lives with family


Occupation Status:  retired





Allergies


Coded Allergies:  


     Finasteride (Unverified  Adverse Reaction, Severe, RASH, 9/30/17)





Home Medications


Scheduled


Amiodarone Hcl (Cordarone), 200 MG PO DAILY


Aspirin (Aspirin EC Low Dose), 81 MG PO DAILY


Atorvastatin (Lipitor), 10 MG PO DAILY


Levothyroxine Sodium (Synthroid), 75 MCG PO DAILY


Omeprazole (Prilosec), 40 MG PO DAILYBB


Probiotic Product (Probiotic), 1 CAP PO DAILY


Tamsulosin Hcl (Flomax), 0.4 MG PO HS


Warfarin Sod (Jantoven), 3 MG PO UD





Scheduled PRN


Hydrocodone/Acetaminophen 5MG/325MG (Norco 5MG/325MG), 1-2 TAB PO Q6H PRN for 

Pain


Polyethylene (Miralax), 17 GM PO DAILY PRN for Constipation


Tramadol (Ultram), 25 MG PO Q6H PRN for Pain

## 2017-10-20 ENCOUNTER — HOSPITAL ENCOUNTER (OUTPATIENT)
Dept: HOSPITAL 45 - C.RDSM | Age: 82
Discharge: HOME | End: 2017-10-20
Attending: ORTHOPAEDIC SURGERY
Payer: COMMERCIAL

## 2017-10-20 DIAGNOSIS — S82.891A: Primary | ICD-10-CM
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
## 2017-11-03 RX ADMIN — SODIUM CHLORIDE, SODIUM ACETATE ANHYDROUS, SODIUM GLUCONATE, POTASSIUM CHLORIDE, AND MAGNESIUM CHLORIDE SCH MLS/HR: 526; 222; 502; 37; 30 INJECTION, SOLUTION INTRAVENOUS at 18:45

## 2017-11-04 ENCOUNTER — HOSPITAL ENCOUNTER (INPATIENT)
Dept: HOSPITAL 45 - C.EDC | Age: 82
LOS: 12 days | Discharge: NURSING, REHAB OR LONG-TERM CARE | DRG: 871 | End: 2017-11-16
Attending: HOSPITALIST | Admitting: HOSPITALIST
Payer: COMMERCIAL

## 2017-11-04 VITALS — SYSTOLIC BLOOD PRESSURE: 110 MMHG | DIASTOLIC BLOOD PRESSURE: 49 MMHG | HEART RATE: 109 BPM

## 2017-11-04 VITALS
OXYGEN SATURATION: 100 % | TEMPERATURE: 98.78 F | SYSTOLIC BLOOD PRESSURE: 96 MMHG | DIASTOLIC BLOOD PRESSURE: 54 MMHG | HEART RATE: 67 BPM

## 2017-11-04 VITALS — HEART RATE: 176 BPM | SYSTOLIC BLOOD PRESSURE: 91 MMHG | DIASTOLIC BLOOD PRESSURE: 52 MMHG | OXYGEN SATURATION: 74 %

## 2017-11-04 VITALS — OXYGEN SATURATION: 94 % | DIASTOLIC BLOOD PRESSURE: 45 MMHG | SYSTOLIC BLOOD PRESSURE: 65 MMHG | HEART RATE: 74 BPM

## 2017-11-04 VITALS
HEART RATE: 80 BPM | DIASTOLIC BLOOD PRESSURE: 40 MMHG | TEMPERATURE: 98.78 F | SYSTOLIC BLOOD PRESSURE: 74 MMHG | OXYGEN SATURATION: 95 %

## 2017-11-04 VITALS — OXYGEN SATURATION: 95 % | HEART RATE: 83 BPM | DIASTOLIC BLOOD PRESSURE: 43 MMHG | SYSTOLIC BLOOD PRESSURE: 84 MMHG

## 2017-11-04 VITALS
SYSTOLIC BLOOD PRESSURE: 72 MMHG | OXYGEN SATURATION: 93 % | HEART RATE: 74 BPM | TEMPERATURE: 98.06 F | DIASTOLIC BLOOD PRESSURE: 40 MMHG

## 2017-11-04 VITALS — HEART RATE: 70 BPM | DIASTOLIC BLOOD PRESSURE: 51 MMHG | OXYGEN SATURATION: 97 % | SYSTOLIC BLOOD PRESSURE: 99 MMHG

## 2017-11-04 VITALS — OXYGEN SATURATION: 97 % | DIASTOLIC BLOOD PRESSURE: 43 MMHG | HEART RATE: 68 BPM | SYSTOLIC BLOOD PRESSURE: 83 MMHG

## 2017-11-04 VITALS — OXYGEN SATURATION: 90 % | SYSTOLIC BLOOD PRESSURE: 82 MMHG | DIASTOLIC BLOOD PRESSURE: 40 MMHG | HEART RATE: 74 BPM

## 2017-11-04 VITALS — SYSTOLIC BLOOD PRESSURE: 78 MMHG | DIASTOLIC BLOOD PRESSURE: 42 MMHG | HEART RATE: 68 BPM | OXYGEN SATURATION: 95 %

## 2017-11-04 VITALS — HEART RATE: 66 BPM | SYSTOLIC BLOOD PRESSURE: 90 MMHG | OXYGEN SATURATION: 97 % | DIASTOLIC BLOOD PRESSURE: 47 MMHG

## 2017-11-04 VITALS — HEART RATE: 79 BPM | SYSTOLIC BLOOD PRESSURE: 70 MMHG | DIASTOLIC BLOOD PRESSURE: 41 MMHG | OXYGEN SATURATION: 95 %

## 2017-11-04 VITALS — SYSTOLIC BLOOD PRESSURE: 85 MMHG | DIASTOLIC BLOOD PRESSURE: 47 MMHG | OXYGEN SATURATION: 97 % | HEART RATE: 68 BPM

## 2017-11-04 VITALS — DIASTOLIC BLOOD PRESSURE: 46 MMHG | HEART RATE: 71 BPM | SYSTOLIC BLOOD PRESSURE: 109 MMHG | OXYGEN SATURATION: 98 %

## 2017-11-04 VITALS — DIASTOLIC BLOOD PRESSURE: 68 MMHG | SYSTOLIC BLOOD PRESSURE: 138 MMHG | HEART RATE: 104 BPM

## 2017-11-04 VITALS
WEIGHT: 227.74 LBS | BODY MASS INDEX: 31.88 KG/M2 | HEIGHT: 71 IN | BODY MASS INDEX: 31.88 KG/M2 | WEIGHT: 227.74 LBS | HEIGHT: 71 IN

## 2017-11-04 VITALS — HEART RATE: 71 BPM | SYSTOLIC BLOOD PRESSURE: 92 MMHG | OXYGEN SATURATION: 96 % | DIASTOLIC BLOOD PRESSURE: 43 MMHG

## 2017-11-04 VITALS — HEART RATE: 64 BPM | OXYGEN SATURATION: 100 % | DIASTOLIC BLOOD PRESSURE: 50 MMHG | SYSTOLIC BLOOD PRESSURE: 94 MMHG

## 2017-11-04 VITALS — DIASTOLIC BLOOD PRESSURE: 44 MMHG | SYSTOLIC BLOOD PRESSURE: 76 MMHG | OXYGEN SATURATION: 95 % | HEART RATE: 78 BPM

## 2017-11-04 VITALS — SYSTOLIC BLOOD PRESSURE: 73 MMHG | DIASTOLIC BLOOD PRESSURE: 49 MMHG | HEART RATE: 76 BPM | OXYGEN SATURATION: 96 %

## 2017-11-04 VITALS — DIASTOLIC BLOOD PRESSURE: 47 MMHG | SYSTOLIC BLOOD PRESSURE: 91 MMHG | OXYGEN SATURATION: 99 % | HEART RATE: 66 BPM

## 2017-11-04 VITALS — OXYGEN SATURATION: 97 % | HEART RATE: 91 BPM | DIASTOLIC BLOOD PRESSURE: 46 MMHG | SYSTOLIC BLOOD PRESSURE: 106 MMHG

## 2017-11-04 VITALS — HEART RATE: 70 BPM | OXYGEN SATURATION: 94 % | DIASTOLIC BLOOD PRESSURE: 42 MMHG | SYSTOLIC BLOOD PRESSURE: 73 MMHG

## 2017-11-04 VITALS — HEART RATE: 77 BPM | SYSTOLIC BLOOD PRESSURE: 72 MMHG | DIASTOLIC BLOOD PRESSURE: 46 MMHG | OXYGEN SATURATION: 94 %

## 2017-11-04 VITALS — DIASTOLIC BLOOD PRESSURE: 66 MMHG | SYSTOLIC BLOOD PRESSURE: 118 MMHG

## 2017-11-04 VITALS — OXYGEN SATURATION: 93 % | DIASTOLIC BLOOD PRESSURE: 64 MMHG | HEART RATE: 100 BPM | SYSTOLIC BLOOD PRESSURE: 105 MMHG

## 2017-11-04 VITALS — DIASTOLIC BLOOD PRESSURE: 37 MMHG | SYSTOLIC BLOOD PRESSURE: 79 MMHG | HEART RATE: 72 BPM | OXYGEN SATURATION: 93 %

## 2017-11-04 VITALS — OXYGEN SATURATION: 98 %

## 2017-11-04 DIAGNOSIS — E78.00: ICD-10-CM

## 2017-11-04 DIAGNOSIS — Z79.82: ICD-10-CM

## 2017-11-04 DIAGNOSIS — A04.72: ICD-10-CM

## 2017-11-04 DIAGNOSIS — E78.5: ICD-10-CM

## 2017-11-04 DIAGNOSIS — S82.891A: ICD-10-CM
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
## 2017-11-04 DIAGNOSIS — R65.21: ICD-10-CM

## 2017-11-04 DIAGNOSIS — Z79.899: ICD-10-CM

## 2017-11-04 DIAGNOSIS — E83.39: ICD-10-CM

## 2017-11-04 DIAGNOSIS — D64.9: ICD-10-CM

## 2017-11-04 DIAGNOSIS — K21.9: ICD-10-CM

## 2017-11-04 DIAGNOSIS — N39.0: ICD-10-CM

## 2017-11-04 DIAGNOSIS — A41.51: Primary | ICD-10-CM

## 2017-11-04 DIAGNOSIS — Z79.01: ICD-10-CM

## 2017-11-04 DIAGNOSIS — E87.6: ICD-10-CM

## 2017-11-04 DIAGNOSIS — E03.9: ICD-10-CM

## 2017-11-04 DIAGNOSIS — N17.9: ICD-10-CM

## 2017-11-04 DIAGNOSIS — G25.2: ICD-10-CM

## 2017-11-04 DIAGNOSIS — N18.3: ICD-10-CM

## 2017-11-04 DIAGNOSIS — I48.0: ICD-10-CM

## 2017-11-04 LAB
ALP SERPL-CCNC: 181 U/L (ref 45–117)
ALT SERPL-CCNC: 64 U/L (ref 12–78)
ANION GAP SERPL CALC-SCNC: 16 MMOL/L (ref 16–25)
ANION GAP SERPL CALC-SCNC: 8 MMOL/L (ref 3–11)
APPEARANCE UR: (no result)
AST SERPL-CCNC: 103 U/L (ref 15–37)
BASOPHILS # BLD: 0 K/UL (ref 0–0.2)
BASOPHILS NFR BLD: 0 %
BILIRUB UR-MCNC: (no result) MG/DL
BUN SERPL-MCNC: 28 MG/DL (ref 7–18)
BUN/CREAT SERPL: 14.9 (ref 10–20)
CA-I BLD-SCNC: 1.1 MMOL/L (ref 1.12–1.32)
CALCIUM SERPL-MCNC: 7.9 MG/DL (ref 8.5–10.1)
CHLORIDE BLD-SCNC: 101 MEQ/L (ref 101–112)
CHLORIDE SERPL-SCNC: 105 MMOL/L (ref 98–107)
CKMB/CK RATIO: 2 (ref 0–3)
CO2 SERPL-SCNC: 25 MMOL/L (ref 21–32)
COLOR UR: (no result)
COMPLETE: YES
CREAT BLD-MCNC: 1.8 MG/DL (ref 0.6–1.3)
CREAT CL PREDICTED SERPL C-G-VRATE: 33.3 ML/MIN
CREAT SERPL-MCNC: 1.87 MG/DL (ref 0.6–1.4)
DOHLE BOD BLD QL SMEAR: (no result)
EOSINOPHIL NFR BLD AUTO: 190 K/UL (ref 130–400)
GLUCOSE SERPL-MCNC: 87 MG/DL (ref 70–99)
HCT VFR BLD AUTO: 29 % (ref 42–52)
HCT VFR BLD CALC: 30.7 % (ref 42–52)
HGB BLD-MCNC: 9.9 G/DL (ref 14–18)
IG%: 0.3 %
IMM GRANULOCYTES NFR BLD AUTO: 1.5 %
INR PPP: 1.3 (ref 0.9–1.1)
ISTAT CARBON DIOXIDE: 25 MEQ/L (ref 24–31)
LYMPHOCYTES # BLD: 0.2 K/UL (ref 1.2–3.4)
MAGNESIUM SERPL-MCNC: 1.6 MG/DL (ref 1.8–2.4)
MANUAL MICROSCOPIC REQUIRED?: NO
MCH RBC QN AUTO: 28.3 PG (ref 25–34)
MCHC RBC AUTO-ENTMCNC: 30.9 G/DL (ref 32–36)
MCV RBC AUTO: 91.4 FL (ref 80–100)
MONOCYTES NFR BLD: 1.5 %
NEUTROPHILS # BLD AUTO: 0 %
NEUTROPHILS NFR BLD AUTO: 96.7 %
NITRITE UR QL STRIP: (no result)
PH UR STRIP: 5 [PH] (ref 4.5–7.5)
PMV BLD AUTO: 10.6 FL (ref 7.4–10.4)
POLYCHROMASIA BLD QL SMEAR: (no result)
POTASSIUM SERPL-SCNC: 3.7 MMOL/L (ref 3.5–5.1)
PROTHROMBIN TIME: 14 SECONDS (ref 9–12)
RBC # BLD AUTO: 3.36 M/UL (ref 4.7–6.1)
REVIEW REQ?: NO
SODIUM BLD-SCNC: 138 MEQ/L (ref 135–144)
SODIUM SERPL-SCNC: 138 MMOL/L (ref 136–145)
SP GR UR STRIP: 1.02 (ref 1–1.03)
URINE EPITHELIAL CELL AUTO: (no result) /LPF (ref 0–5)
UROBILINOGEN UR-MCNC: (no result) MG/DL
VACUOLIZATION: (no result)
WBC # BLD AUTO: 13.25 K/UL (ref 4.8–10.8)
ZZURINE CULT IF INDIC CATH: YES

## 2017-11-04 RX ADMIN — SODIUM CHLORIDE, SODIUM ACETATE ANHYDROUS, SODIUM GLUCONATE, POTASSIUM CHLORIDE, AND MAGNESIUM CHLORIDE SCH MLS/HR: 526; 222; 502; 37; 30 INJECTION, SOLUTION INTRAVENOUS at 17:47

## 2017-11-04 RX ADMIN — WARFARIN SODIUM SCH MG: 1 TABLET ORAL at 17:20

## 2017-11-04 RX ADMIN — SODIUM CHLORIDE, SODIUM ACETATE ANHYDROUS, SODIUM GLUCONATE, POTASSIUM CHLORIDE, AND MAGNESIUM CHLORIDE SCH MLS/HR: 526; 222; 502; 37; 30 INJECTION, SOLUTION INTRAVENOUS at 22:13

## 2017-11-04 RX ADMIN — TAMSULOSIN HYDROCHLORIDE SCH MG: 0.4 CAPSULE ORAL at 22:17

## 2017-11-04 RX ADMIN — DOCUSATE SODIUM SCH MG: 100 CAPSULE, LIQUID FILLED ORAL at 21:00

## 2017-11-04 RX ADMIN — PIPERACILLIN SODIUM, TAZOBACTAM SODIUM SCH MLS/HR: 4; .5 INJECTION, POWDER, LYOPHILIZED, FOR SOLUTION INTRAVENOUS at 22:14

## 2017-11-04 RX ADMIN — SODIUM CHLORIDE, SODIUM ACETATE ANHYDROUS, SODIUM GLUCONATE, POTASSIUM CHLORIDE, AND MAGNESIUM CHLORIDE SCH MLS/HR: 526; 222; 502; 37; 30 INJECTION, SOLUTION INTRAVENOUS at 17:19

## 2017-11-04 RX ADMIN — HEPARIN SODIUM SCH UNIT: 10000 INJECTION, SOLUTION INTRAVENOUS; SUBCUTANEOUS at 22:17

## 2017-11-04 NOTE — DIAGNOSTIC IMAGING REPORT
EXAMINATION: RENAL ULTRASOUND



CLINICAL HISTORY: Urinary sepsis    



COMPARISON STUDY:  CT scan dated 5/15/2013



FINDINGS: The right kidney measures 10.3 cm. The left kidney measures 10.4 cm. 

There is no evidence of hydronephrosis.  There are no renal masses.



Multiple bladder diverticula are visualized. The prostate is enlarged. The

patient is reported to have a Johnson catheter however the balloon is not

visualized within the bladder.

                 

IMPRESSION :

1. Thick walled bladder containing multiple diverticula

2. No evidence of hydronephrosis

3. Prostate enlargement

4. The patient is reported to have a Johnson catheter however the balloon is not

visualized within the bladder







Electronically signed by:  Jake Duval M.D.

11/4/2017 7:05 PM



Dictated Date/Time:  11/4/2017 7:01 PM

## 2017-11-04 NOTE — DIAGNOSTIC IMAGING REPORT
CT LUMBAR SPINE WITHOUT



CT DOSE:    



CLINICAL HISTORY: low back pain    



TECHNIQUE: Helical images were acquired in transverse plane. Reformatted

sagittal and coronal images were reviewed.  A dose lowering technique was

utilized adhering to the principles of ALARA.





CONTRAST: No contrast was administered



COMPARISON STUDY: None.



FINDINGS: 

L1-2 level: There is no evidence of significant disc bulge or focal herniation.

There is no evidence of spinal or foraminal stenosis.

L2-3 level: There is a minimal disc bulge. There is no significant spinal or

foraminal stenosis

L3-4 level: There is a moderate circumferential disc bulge. There is mild spinal

stenosis.

L4-5 level: There is a mild circumferential disc bulge. There is no significant

spinal foraminal stenosis

L5-S1 level: There is a mild circumferential disc bulge. There is no significant

spinal or foraminal stenosis.



There are sclerotic changes present within the iliac side of both SI joints. No

acute fractures or traumatic subluxations are visualized.



IMPRESSION: 

1. Mild multilevel degenerative changes

2. Disc bulge and mild spinal stenosis at the L3-4 level

3. No evidence of acute fracture or traumatic subluxation. 







Electronically signed by:  Jake Duval M.D.

11/4/2017 9:32 PM



Dictated Date/Time:  11/4/2017 9:30 PM

## 2017-11-04 NOTE — CRITICAL CARE CONSULTATION
Critical Care Consultation


Date of Consultation:


Nov 4, 2017.


Attending Physician:


Grace Ervin DO


Reason for Consultation:


Sepsis from the urinary source.


History of Present Illness


I personally examined this patient, reviewed his clinical and laboratory data, 

interpret his chest x-ray and formulated further plan of care.


In summary, the patient is a 86-year-old  gentleman with history of 

hypertension, hypothyroidism and paroxysmal atrial fibrillation, good 

biventricular systolic function who has been at the rehabilitation facility for 

recent right ankle fracture status requiring ORIF.  2 days ago history of 

complaining of shaking.  Yesterday he developed fever, chills.  His appetite 

was poor, he felt fatigued and had an episode of nonbloody vomitus.  Today his 

blood pressure was found to be in 80s.  He was brought to [facility] emergency room.


Patient was hypotensive with systolic blood pressure in 80s.  He was treated 

with 2 L of IV fluid bolus, norepinephrine drip at 0.02 g/kilogram/minute.  

Vancomycin and Zosyn were given.  Patient was transferred critically ill to the 

surgical intensive care unit in view of persistent hypotension.





Past Medical/Surgical History


1.  Hypertension


2.  Paroxysmal supraventricular tachycardia


3.  Gastroesophageal reflux disease


4.  Dyslipidemia


5.  Hypothyroidism


6.  Central tremors.





PAST SURGICAL HISTORY:   


1.  Bilateral carotid endarterectomy


2.  deep brain stimulators x2 in situ





Family History





Patient reports no known family medical history.


Patient's parents were relatively healthy.  His mother passed away at age of 87

, father at age of 94.





Social History


Smoking Status:  Never Smoker


Marital Status:  


Housing Status:  lives with family


Occupation Status:  retired





Allergies


Coded Allergies:  


     Finasteride (Unverified  Adverse Reaction, Severe, RASH, 11/4/17)





Home Medications


Scheduled


Amiodarone Hcl (Cordarone), 200 MG PO DAILY


Aspirin (Aspirin EC Low Dose), 81 MG PO DAILY


Atorvastatin (Lipitor), 10 MG PO DAILY


Docusate Sodium (Docusate Sodium), 100 MG PO BID


Levothyroxine Sodium (Synthroid), 100 MCG PO DAILY


Omeprazole (Prilosec), 40 MG PO DAILYBB


Probiotic Product (Probiotic), 1 CAP PO DAILY


Tamsulosin Hcl (Flomax), 0.4 MG PO HS


Warfarin Sod (Coumadin), 1 MG PO 3XWK


Warfarin Sod (Jantoven), 0.5 MG PO 4XWK





Scheduled PRN


Acetaminophen (Acetaminophen ER), 650 MG PO Q4 PRN for Pain or Fever


Acetaminophen (Tylenol), 650 MG RE Q4 PRN for Pain


Hydrocodone/Acetaminophen 5MG/325MG (Norco 5MG/325MG), 1-2 TAB PO Q6H PRN for 

Pain


Polyethylene (Miralax), 17 GM PO DAILY PRN for Constipation





Current Inpatient Medications





Current Inpatient Medications








 Medications


  (Trade)  Dose


 Ordered  Sig/Deanne


 Route  Start Time


 Stop Time Status Last Admin


Dose Admin


 


 Sodium Chloride  1,000 ml @ 


 500 mls/hr  Q2H


 IV  11/4/17 17:00


 11/4/17 20:59   


 


 


 Acetaminophen


  (Tylenol Tab)  650 mg  Q4H  PRN


 PO  11/4/17 14:30


 12/4/17 14:29   


 


 


 Norepinephrine


 Bitartrate 8 mg/


 Dextrose  508 ml @ 0


 mls/hr  Q0M PRN


 IV  11/4/17 14:21


 12/4/17 14:20   


 


 


 Pantoprazole


 Sodium


  (Protonix Tab)  40 mg  DAILY


 PO  11/5/17 09:00


 12/5/17 08:59   


 


 


 Morphine Sulfate


  (MoRPHine


 SULFATE INJ)  2 mg  Q2H  PRN


 IV  11/4/17 14:30


 11/18/17 14:29   


 


 


 Amiodarone HCl


  (Cordarone Tab)  200 mg  DAILY


 PO  11/5/17 09:00


 12/5/17 08:59   


 


 


 Aspirin


  (Ecotrin Tab)  81 mg  DAILY


 PO  11/5/17 09:00


 12/5/17 08:59   


 


 


 Atorvastatin


 Calcium


  (Lipitor Tab)  10 mg  DAILY


 PO  11/5/17 09:00


 12/5/17 08:59   


 


 


 Docusate Sodium


  (coLACE CAP)  100 mg  BID


 PO  11/4/17 21:00


 12/4/17 20:59   


 


 


 Acetaminophen/


 Hydrocodone Bitart


  (Norco 5/325 Tab)  1 tab  Q6H  PRN


 PO  11/4/17 14:45


 11/18/17 14:44   


 


 


 Levothyroxine


 Sodium


  (Synthroid Tab)  100 mcg  DAILYBB


 PO  11/5/17 06:00


 12/5/17 06:59   


 


 


 Polyethylene


  (Miralax Powder


 Packet)  17 gm  DAILY  PRN


 PO  11/4/17 14:45


 12/4/17 14:44   


 


 


 Tamsulosin HCl


  (Flomax Cap)  0.4 mg  HS


 PO  11/4/17 21:00


 12/4/17 20:59   


 


 


 Warfarin Sodium


  (Coumadin Tab)  1 mg  MoWeFr@1600


 PO  11/6/17 16:00


 12/6/17 15:59   


 


 


 Warfarin Sodium


  (Coumadin Tab)  0.5 mg  SuTuThSa@1600


 PO  11/4/17 16:00


 12/4/17 15:59   


 


 


 Vancomycin HCl


  (Consult)  1 ea  UD  PRN


 N/A  11/4/17 16:15


 11/14/17 16:14   


 


 


 Piperacillin Sod/


 Tazobactam Sod


  (Consult)  1 ea  UD  PRN


 N/A  11/4/17 16:15


 11/14/17 16:14   


 


 


 Piperacillin Sod/


 Tazobactam Sod


 4.5 gm/Dextrose  120 ml @ 


 30 mls/hr  Q8H


 IV  11/4/17 20:00


 11/6/17 11:59   


 


 


 Vancomycin HCl


 1500 mg/Sodium


 Chloride  530 ml @ 


 200 mls/hr  DAILY@0800


 IV  11/5/17 08:00


 11/6/17 12:59   


 


 


 Sodium Chloride  1,000 ml @ 


 125 mls/hr  Q8H


 IV  11/4/17 21:00


 12/4/17 20:59   


 











Review of Systems


Patient denies any chest pain, chest tightness, shortness of breath, cough, 

sputum production, abdominal pain, vomiting or diarrhea.


Constitutional:  + fever, + chills, + weakness, + fatigue


Abdomen:  + nausea





Physical Exam











  Date Time  Temp Pulse Resp B/P (MAP) Pulse Ox O2 Delivery O2 Flow Rate FiO2


 


11/4/17 15:45  76 18 90/57 98 Nasal Cannula 2.0 


 


11/4/17 15:30  76 15 87/62 95   


 


11/4/17 15:15  79 19 94/56 91 Nasal Cannula 2.0 


 


11/4/17 15:00  68 19 98/55 94   


 


11/4/17 13:38    84/50  Nasal Cannula 2.0 


 


11/4/17 12:42  82 18  84   


 


11/4/17 12:27  85 24  100   


 


11/4/17 12:15    72/45    


 


11/4/17 12:12  84      


 


11/4/17 12:12  84 22  98   


 


11/4/17 12:00    78/49    


 


11/4/17 11:57  215 18 87/52    


 


11/4/17 11:46     94 Nasal Cannula 2.0 


 


11/4/17 11:46     90 Room Air  


 


11/4/17 11:42  88 17  95   


 


11/4/17 11:41 36.7 87 18 77/47 96 Nasal Cannula  


 


11/4/17 11:40    77/47    


 


11/4/17 11:36    81/55    








General: Elderly frail  gentleman who is laying in bed, no significant 

distress.


 Head exam is unremarkable. No scleral icterus of corneal arcus noted. 


Neck is without jugular venous distension, thyromegally, or carotid bruits. 


Carotid upstrokes are brisk bilaterally. no bruits


Lungs are clear to auscultation and percussion. 


Cardiac exam : Irregular rhythm no murmurs  


Abdominal exam reveals normal bowl sounds, no masses, no organomegaly and no 

aortic enlargement. 


Extremities are nonedematous and both femoral and pedal pulses are normal.  

Right ankle cast in situ.  Good capillary refill in the right toe, moves toes 

freely.


Psychiatric: Slightly lethargic.


Neurologic: grossly normal, no focal deficits.  Alert to place and people around

, disoriented to time.





Laboratory Results





Last 24 Hours








Test


  11/4/17


11:57 11/4/17


12:50 11/4/17


13:00 11/4/17


13:03


 


Urine Color DK YELLOW    


 


Urine Appearance TURBID    


 


Urine pH 5.0    


 


Urine Specific Gravity 1.016    


 


Urine Protein 2+    


 


Urine Glucose (UA) NEG    


 


Urine Ketones NEG    


 


Urine Occult Blood 3+    


 


Urine Nitrite NEG    


 


Urine Bilirubin NEG    


 


Urine Urobilinogen NEG    


 


Urine Leukocyte Esterase LARGE    


 


Urine WBC (Auto) >30 /hpf    


 


Urine RBC (Auto) 10-30 /hpf    


 


Urine Hyaline Casts (Auto) 1-5 /lpf    


 


Urine Epithelial Cells (Auto) 0-5 /lpf    


 


Urine Bacteria (Auto) 4+    


 


White Blood Count  13.25 K/uL   


 


Red Blood Count  3.36 M/uL   


 


Hemoglobin  9.5 g/dL   


 


Hematocrit  30.7 %   


 


Mean Corpuscular Volume  91.4 fL   


 


Mean Corpuscular Hemoglobin  28.3 pg   


 


Mean Corpuscular Hemoglobin


Concent 


  30.9 g/dl 


  


  


 


 


Platelet Count  190 K/uL   


 


Mean Platelet Volume  10.6 fL   


 


Neutrophils (%) (Auto)  96.7 %   


 


Lymphocytes (%) (Auto)  1.5 %   


 


Monocytes (%) (Auto)  1.5 %   


 


Eosinophils (%) (Auto)  0.0 %   


 


Basophils (%) (Auto)  0.0 %   


 


Neutrophils # (Auto)  12.81 K/uL   


 


Lymphocytes # (Auto)  0.20 K/uL   


 


Monocytes # (Auto)  0.20 K/uL   


 


Eosinophils # (Auto)  0.00 K/uL   


 


Basophils # (Auto)  0.00 K/uL   


 


RDW Standard Deviation  54.2 fL   


 


RDW Coefficient of Variation  16.3 %   


 


Immature Granulocyte % (Auto)  0.3 %   


 


Immature Granulocyte # (Auto)  0.04 K/uL   


 


Toxic Vacuolation  2+   


 


Dohle Bodies  1+   


 


Polychromasia  1+   


 


Prothrombin Time  14.0 SECONDS   


 


Prothromb Time International


Ratio 


  1.3 


  


  


 


 


Sodium Level  138 mmol/L   


 


Potassium Level  3.7 mmol/L   


 


Chloride Level  105 mmol/L   


 


Carbon Dioxide Level  25 mmol/L   


 


Anion Gap  8.0 mmol/L   16.0 mmol/L 


 


Blood Urea Nitrogen  28 mg/dl   


 


Creatinine  1.87 mg/dl   


 


Est Creatinine Clear Calc


Drug Dose 


  33.3 ml/min 


  


  


 


 


Estimated GFR (


American) 


  36.9 


  


  


 


 


Estimated GFR (Non-


American 


  31.8 


  


  


 


 


BUN/Creatinine Ratio  14.9   


 


Random Glucose  87 mg/dl   


 


Calcium Level  7.9 mg/dl   


 


Magnesium Level  1.6 mg/dl   


 


Total Bilirubin  1.1 mg/dl   


 


Direct Bilirubin  0.7 mg/dl   


 


Aspartate Amino Transf


(AST/SGOT) 


  103 U/L 


  


  


 


 


Alanine Aminotransferase


(ALT/SGPT) 


  64 U/L 


  


  


 


 


Alkaline Phosphatase  181 U/L   


 


Total Creatine Kinase  44 U/L   


 


Creatine Kinase MB  0.9 ng/ml   


 


Creatine Kinase MB Ratio  2.0   


 


Troponin I  0.402 ng/ml   


 


Total Protein  5.4 gm/dl   


 


Albumin  1.9 gm/dl   


 


Bedside Lactic Acid Venous   2.99 mmol/L  


 


Bedside Hemoglobin    9.9 g/dl 


 


Bedside Hematocrit    29 % 


 


Bedside Sodium    138 mEq/L 


 


Bedside Potassium    3.7 mEq/L 


 


Bedside Chloride    101 mEq/L 


 


Bedside Total CO2    25 mEq/l 


 


Bedside Blood Urea Nitrogen    24 mg/dl 


 


Bedside Creatinine    1.8 mg/dl 


 


Bedside Glucose (other)    86 mg/dl 


 


Bedside Ionized Calcium (Kee)    1.10 mmol/l 











Assessment & Plan


1.  Sepsis likely from the urinary source.  We will continue with vancomycin 

and Zosyn while awaiting for cultures.  We will obtain bilateral ultrasound of 

kidneys to rule out hydronephrosis.


2.  Cardiovascular: 


   -Hypotension secondary to intravascular volume depletion and vasodilation.  

We will continue with fluid resuscitation as patient still appears to be 

intravascular volume depleted, titrate norepinephrine drip to keep maps above 

65 mmHg.


   -Paroxysmal atrial fibrillation on Coumadin at home.  Patient is in sinus 

rhythm currently.  Subtherapeutic INR at 1.3.  We'll continue with daily 

Coumadin, monitor INR.  We will continue with amiodarone maintenance.


   -Good biventricular systolic function.


3.  Respiratory: Adequate gas exchange on 2 L of oxygen.


4.  Acute on chronic kidney injury creatinine (creatinine 1.25 on 11/1/17) .  

We will continue with fluid resuscitation, replace potassium.  We will check 

urine sodium and creatinine.


5.  Anemia (hemoglobin 13.4 on 9/30/17).


6.  GI: Nothing by mouth for now.  Protonix for GERD.


7.  Hypothyroidism for what will continue with Synthroid.


8.  Central tremors.  Deep brain stimulators are turned off.


9.  Status post bilateral CEA on aspirin 81 mg daily.


10.  Hypercholesterolemia on Lipitor.


11.  Heparin for DVT prophylaxis while INR is still subtherapeutic.


Patient is full code.





I spent totally 35 minutes of critical care time involved in managing this 

patient

## 2017-11-04 NOTE — HISTORY AND PHYSICAL
History & Physical


Date & Time of Service:


Nov 4, 2017 at 14:34


Chief Complaint:


Illness


Primary Care Physician:


Toole, Crest


History of Present Illness


Source:  patient, family, clinic records, hospital records


85 yo M presents with worsening chills and fevers this morning.  He had some 

dysuria recently but mostly rigors has been noted by he and other family 

members while the patient has been at [facility].  He was admitted one month 

ago wtih a R ankle fracture and on discharge went to [facility] until 

recently, transferred to [facility] for a few month stay as a transition to 

home.  He reports tolerating PO, denies any pain in his legs, abdomen, chest or 

anywhere else.  Has notable abdominal discomfort on exam and bilateral CVA 

tenderness.  He denies any nausea, vomiting or diarrhea.  He and family report 

some PO intake here and there, and patient states that he has been eating.  He 

is mentating normally.  Mucous membranes are very dry on exam and he appears 

dehydrated.  He was evaluated by myself and the ICU physician, DR. Nigel Balbuena

, simultaneously.





Past Medical/Surgical History


Medical Problems:


(1) Anticoagulated on Coumadin


Status: Chronic  





(2) York's esophagus


Status: Chronic  





(3) Carotid artery surgery


Status: Resolved  





(4) CKD (chronic kidney disease), stage III


Status: Chronic  





(5) Essential tremor


Status: Chronic  





(6) Gastroesophageal reflux disease


Status: Chronic  





(7) Hyperlipidemia


Status: Chronic  





(8) Hypothyroidism


Status: Chronic  





(9) Paroxysmal atrial fibrillation


Status: Chronic  





(10) Placement of deep brain stimulators


Status: Resolved  





(11) Status post ORIF of fracture of ankle


Status: Chronic  








Family History





Patient reports no known family medical history.





Social History


Smoking Status:  Never Smoker


Smokeless Tobacco Use:  No


Alcohol Use:  none


Drug Use:  none


Marital Status:  


Housing status:  nursing home


Occupational Status:  retired





Immunizations


History of Influenza Vaccine:  Yes


Influenza Vaccine Date:  Nov 16, 2016


History of Tetanus Vaccine?:  No


Tetanus Immunization Date:  Mar 24, 2017


History of Pneumococcal:  Yes


Pneumococcal Date:  Aug 15, 2015


History of Hepatitis B Vaccine:  No





Multi-Drug Resistant Organisms


History of MDRO:  No





Allergies


Coded Allergies:  


     Finasteride (Unverified  Adverse Reaction, Severe, RASH, 11/4/17)





Home Medications


Scheduled


Amiodarone Hcl (Cordarone), 200 MG PO DAILY


Aspirin (Aspirin EC Low Dose), 81 MG PO DAILY


Atorvastatin (Lipitor), 10 MG PO DAILY


Docusate Sodium (Docusate Sodium), 100 MG PO BID


Levothyroxine Sodium (Synthroid), 100 MCG PO DAILY


Omeprazole (Prilosec), 40 MG PO DAILYBB


Probiotic Product (Probiotic), 1 CAP PO DAILY


Tamsulosin Hcl (Flomax), 0.4 MG PO HS


Warfarin Sod (Coumadin), 1 MG PO 3XWK


Warfarin Sod (Jantoven), 0.5 MG PO 4XWK





Scheduled PRN


Acetaminophen (Acetaminophen ER), 650 MG PO Q4 PRN for Pain or Fever


Acetaminophen (Tylenol), 650 MG RE Q4 PRN for Pain


Hydrocodone/Acetaminophen 5MG/325MG (Norco 5MG/325MG), 1-2 TAB PO Q6H PRN for 

Pain


Polyethylene (Miralax), 17 GM PO DAILY PRN for Constipation





Review of Systems


At least ten systems reviewed and negative except as indicated in HPI.





Physical Exam


Vital Signs











  Date Time  Temp Pulse Resp B/P (MAP) Pulse Ox O2 Delivery O2 Flow Rate FiO2


 


11/4/17 13:38    84/50  Nasal Cannula 2.0 


 


11/4/17 12:42  82 18  84   


 


11/4/17 12:27  85 24  100   


 


11/4/17 12:15    72/45    


 


11/4/17 12:12  84      


 


11/4/17 12:12  84 22  98   


 


11/4/17 12:00    78/49    


 


11/4/17 11:57  215 18 87/52    


 


11/4/17 11:46     94 Nasal Cannula 2.0 


 


11/4/17 11:46     90 Room Air  


 


11/4/17 11:42  88 17  95   


 


11/4/17 11:41 36.7 87 18 77/47 96 Nasal Cannula  


 


11/4/17 11:40    77/47    


 


11/4/17 11:36    81/55    








General Appearance:  WD/WN, no apparent distress, + pertinent finding (lying 

supine, appears very dry and deconditioned.  Elderly)


Head:  normocephalic, atraumatic


Eyes:  normal inspection, PERRL, EOMI, sclerae normal


ENT:  TMs normal, + pertinent finding (very dry mucous membranes)


Neck:  supple, trachea midline


Respiratory/Chest:  lungs clear, normal breath sounds, no respiratory distress, 

no accessory muscle use


Cardiovascular:  no edema, normal peripheral pulses (except in L ankle where 

there is a hard cast present.), + tachycardia, + pertinent finding


Abdomen/GI:  normal bowel sounds, soft, + tenderness, + pertinent finding (mild 

diffuse tenderness to palpation, bilateral CVA tenderness)


Back:  normal inspection, + left CVA tenderness, + right CVA tenderness


Extremities/Musculoskelatal:  + pertinent finding (pt moves all extremities 

equally, has aircast/brace on L ankle and hard cast on R ankle and foot. )


Neurologic/Psych:  CNs II-XII nml as tested, no motor/sensory deficits, alert, 

oriented x 3


Skin:  normal color, warm/dry





Diagnostics


Laboratory Results


11/4/17 12:50








Red Blood Count 3.36, Mean Corpuscular Volume 91.4, Mean Corpuscular Hemoglobin 

28.3, Mean Corpuscular Hemoglobin Concent 30.9, Mean Platelet Volume 10.6, 

Neutrophils (%) (Auto) 96.7, Lymphocytes (%) (Auto) 1.5, Monocytes (%) (Auto) 

1.5, Eosinophils (%) (Auto) 0.0, Basophils (%) (Auto) 0.0, Neutrophils # (Auto) 

12.81, Lymphocytes # (Auto) 0.20, Monocytes # (Auto) 0.20, Eosinophils # (Auto) 

0.00, Basophils # (Auto) 0.00





11/4/17 12:50

















Test


  11/4/17


11:57 11/4/17


12:50 11/4/17


13:00 11/4/17


13:03


 


Urine Color DK YELLOW    


 


Urine Appearance TURBID (CLEAR)    


 


Urine pH 5.0 (4.5-7.5)    


 


Urine Specific Gravity


  1.016


(1.000-1.030) 


  


  


 


 


Urine Protein 2+ (NEG)    


 


Urine Glucose (UA) NEG (NEG)    


 


Urine Ketones NEG (NEG)    


 


Urine Occult Blood 3+ (NEG)    


 


Urine Nitrite NEG (NEG)    


 


Urine Bilirubin NEG (NEG)    


 


Urine Urobilinogen NEG (NEG)    


 


Urine Leukocyte Esterase LARGE (NEG)    


 


Urine WBC (Auto) >30 /hpf (0-5)    


 


Urine RBC (Auto)


  10-30 /hpf


(0-4) 


  


  


 


 


Urine Hyaline Casts (Auto) 1-5 /lpf (0-5)    


 


Urine Epithelial Cells (Auto) 0-5 /lpf (0-5)    


 


Urine Bacteria (Auto) 4+ (NEG)    


 


White Blood Count


  


  13.25 K/uL


(4.8-10.8) 


  


 


 


Red Blood Count


  


  3.36 M/uL


(4.7-6.1) 


  


 


 


Hemoglobin


  


  9.5 g/dL


(14.0-18.0) 


  


 


 


Hematocrit  30.7 % (42-52)   


 


Mean Corpuscular Volume


  


  91.4 fL


() 


  


 


 


Mean Corpuscular Hemoglobin


  


  28.3 pg


(25-34) 


  


 


 


Mean Corpuscular Hemoglobin


Concent 


  30.9 g/dl


(32-36) 


  


 


 


Platelet Count


  


  190 K/uL


(130-400) 


  


 


 


Mean Platelet Volume


  


  10.6 fL


(7.4-10.4) 


  


 


 


Neutrophils (%) (Auto)  96.7 %   


 


Lymphocytes (%) (Auto)  1.5 %   


 


Monocytes (%) (Auto)  1.5 %   


 


Eosinophils (%) (Auto)  0.0 %   


 


Basophils (%) (Auto)  0.0 %   


 


Neutrophils # (Auto)


  


  12.81 K/uL


(1.4-6.5) 


  


 


 


Lymphocytes # (Auto)


  


  0.20 K/uL


(1.2-3.4) 


  


 


 


Monocytes # (Auto)


  


  0.20 K/uL


(0.11-0.59) 


  


 


 


Eosinophils # (Auto)


  


  0.00 K/uL


(0-0.5) 


  


 


 


Basophils # (Auto)


  


  0.00 K/uL


(0-0.2) 


  


 


 


RDW Standard Deviation


  


  54.2 fL


(36.4-46.3) 


  


 


 


RDW Coefficient of Variation


  


  16.3 %


(11.5-14.5) 


  


 


 


Immature Granulocyte % (Auto)  0.3 %   


 


Immature Granulocyte # (Auto)


  


  0.04 K/uL


(0.00-0.02) 


  


 


 


Toxic Vacuolation  2+   


 


Dohle Bodies  1+   


 


Polychromasia  1+   


 


Prothrombin Time


  


  14.0 SECONDS


(9.0-12.0) 


  


 


 


Prothromb Time International


Ratio 


  1.3 (0.9-1.1) 


  


  


 


 


Est Creatinine Clear Calc


Drug Dose 


  33.3 ml/min 


  


  


 


 


Estimated GFR (


American) 


  36.9 


  


  


 


 


Estimated GFR (Non-


American 


  31.8 


  


  


 


 


BUN/Creatinine Ratio  14.9 (10-20)   


 


Calcium Level


  


  7.9 mg/dl


(8.5-10.1) 


  


 


 


Magnesium Level


  


  1.6 mg/dl


(1.8-2.4) 


  


 


 


Total Bilirubin


  


  1.1 mg/dl


(0.2-1) 


  


 


 


Direct Bilirubin


  


  0.7 mg/dl


(0-0.2) 


  


 


 


Aspartate Amino Transf


(AST/SGOT) 


  103 U/L


(15-37) 


  


 


 


Alanine Aminotransferase


(ALT/SGPT) 


  64 U/L (12-78) 


  


  


 


 


Alkaline Phosphatase


  


  181 U/L


() 


  


 


 


Total Creatine Kinase


  


  44 U/L


() 


  


 


 


Creatine Kinase MB


  


  0.9 ng/ml


(0.5-3.6) 


  


 


 


Creatine Kinase MB Ratio  2.0 (0-3.0)   


 


Troponin I


  


  0.402 ng/ml


(0-0.045) 


  


 


 


Total Protein


  


  5.4 gm/dl


(6.4-8.2) 


  


 


 


Albumin


  


  1.9 gm/dl


(3.4-5.0) 


  


 


 


Bedside Lactic Acid Venous


  


  


  2.99 mmol/L


(0.90-1.70) 


 


 


Bedside Hemoglobin


  


  


  


  9.9 g/dl


(14.0-18.0)


 


Bedside Hematocrit    29 % (42-52) 


 


Bedside Sodium


  


  


  


  138 mEq/L


(135-144)


 


Bedside Potassium


  


  


  


  3.7 mEq/L


(3.3-5.0)


 


Bedside Chloride


  


  


  


  101 mEq/L


(101-112)


 


Bedside Total CO2


  


  


  


  25 mEq/l


(24-31)


 


Anion Gap


  


  


  


  16.0 mmol/L


(16-25)


 


Bedside Blood Urea Nitrogen


  


  


  


  24 mg/dl


(7-18)


 


Bedside Creatinine


  


  


  


  1.8 mg/dl


(0.6-1.3)


 


Bedside Glucose (other)


  


  


  


  86 mg/dl


(70-99)


 


Bedside Ionized Calcium (Kee)


  


  


  


  1.10 mmol/l


(1.12-1.32)














 Date/Time


Source Procedure


Growth Status


 


 


 11/4/17 12:45


Blood Blood Culture


Pending Received


 


 11/4/17 16:40


Nasal MRSA DNA Surveillance Screen - Final


Specimen Negative for MRSA by DNA Probe Complete


 


 11/4/17 11:57


Urine,Catheterized Urine Culture


Pending Received








Results Past 24 Hours








Test


  11/4/17


11:57 11/4/17


12:50 11/4/17


13:00 11/4/17


13:03 Range/Units


 


 


Urine Color DK YELLOW     


 


Urine Appearance TURBID    CLEAR  


 


Urine pH 5.0    4.5-7.5  


 


Urine Specific Gravity 1.016    1.000-1.030  


 


Urine Protein 2+    NEG  


 


Urine Glucose (UA) NEG    NEG  


 


Urine Ketones NEG    NEG  


 


Urine Occult Blood 3+    NEG  


 


Urine Nitrite NEG    NEG  


 


Urine Bilirubin NEG    NEG  


 


Urine Urobilinogen NEG    NEG  


 


Urine Leukocyte Esterase LARGE    NEG  


 


Urine WBC (Auto) >30    0-5  /hpf


 


Urine RBC (Auto) 10-30    0-4  /hpf


 


Urine Hyaline Casts (Auto) 1-5    0-5  /lpf


 


Urine Epithelial Cells (Auto) 0-5    0-5  /lpf


 


Urine Bacteria (Auto) 4+    NEG  


 


White Blood Count  13.25   4.8-10.8  K/uL


 


Red Blood Count  3.36   4.7-6.1  M/uL


 


Hemoglobin  9.5   14.0-18.0  g/dL


 


Hematocrit  30.7   42-52  %


 


Mean Corpuscular Volume  91.4     fL


 


Mean Corpuscular Hemoglobin  28.3   25-34  pg


 


Mean Corpuscular Hemoglobin


Concent 


  30.9


  


  


  32-36  g/dl


 


 


Platelet Count  190   130-400  K/uL


 


Mean Platelet Volume  10.6   7.4-10.4  fL


 


Neutrophils (%) (Auto)  96.7    %


 


Lymphocytes (%) (Auto)  1.5    %


 


Monocytes (%) (Auto)  1.5    %


 


Eosinophils (%) (Auto)  0.0    %


 


Basophils (%) (Auto)  0.0    %


 


Neutrophils # (Auto)  12.81   1.4-6.5  K/uL


 


Lymphocytes # (Auto)  0.20   1.2-3.4  K/uL


 


Monocytes # (Auto)  0.20   0.11-0.59  K/uL


 


Eosinophils # (Auto)  0.00   0-0.5  K/uL


 


Basophils # (Auto)  0.00   0-0.2  K/uL


 


RDW Standard Deviation  54.2   36.4-46.3  fL


 


RDW Coefficient of Variation  16.3   11.5-14.5  %


 


Immature Granulocyte % (Auto)  0.3    %


 


Immature Granulocyte # (Auto)  0.04   0.00-0.02  K/uL


 


Toxic Vacuolation  2+    


 


Dohle Bodies  1+    


 


Polychromasia  1+    


 


Prothrombin Time


  


  14.0


  


  


  9.0-12.0


SECONDS


 


Prothromb Time International


Ratio 


  1.3


  


  


  0.9-1.1  


 


 


Sodium Level  138   136-145  mmol/L


 


Potassium Level  3.7   3.5-5.1  mmol/L


 


Chloride Level  105     mmol/L


 


Carbon Dioxide Level  25   21-32  mmol/L


 


Anion Gap  8.0  16.0 16-25  mmol/L


 


Blood Urea Nitrogen  28   7-18  mg/dl


 


Creatinine


  


  1.87


  


  


  0.60-1.40


mg/dl


 


Est Creatinine Clear Calc


Drug Dose 


  33.3


  


  


   ml/min


 


 


Estimated GFR (


American) 


  36.9


  


  


   


 


 


Estimated GFR (Non-


American 


  31.8


  


  


   


 


 


BUN/Creatinine Ratio  14.9   10-20  


 


Random Glucose  87   70-99  mg/dl


 


Calcium Level  7.9   8.5-10.1  mg/dl


 


Magnesium Level  1.6   1.8-2.4  mg/dl


 


Total Bilirubin  1.1   0.2-1  mg/dl


 


Direct Bilirubin  0.7   0-0.2  mg/dl


 


Aspartate Amino Transf


(AST/SGOT) 


  103


  


  


  15-37  U/L


 


 


Alanine Aminotransferase


(ALT/SGPT) 


  64


  


  


  12-78  U/L


 


 


Alkaline Phosphatase  181     U/L


 


Total Creatine Kinase  44     U/L


 


Creatine Kinase MB  0.9   0.5-3.6  ng/ml


 


Creatine Kinase MB Ratio  2.0   0-3.0  


 


Troponin I  0.402   0-0.045  ng/ml


 


Total Protein  5.4   6.4-8.2  gm/dl


 


Albumin  1.9   3.4-5.0  gm/dl


 


Bedside Lactic Acid Venous


  


  


  2.99


  


  0.90-1.70


mmol/L


 


Bedside Hemoglobin    9.9 14.0-18.0  g/dl


 


Bedside Hematocrit    29 42-52  %


 


Bedside Sodium    138 135-144  mEq/L


 


Bedside Potassium    3.7 3.3-5.0  mEq/L


 


Bedside Chloride    101 101-112  mEq/L


 


Bedside Total CO2    25 24-31  mEq/l


 


Bedside Blood Urea Nitrogen    24 7-18  mg/dl


 


Bedside Creatinine    1.8 0.6-1.3  mg/dl


 


Bedside Glucose (other)    86 70-99  mg/dl


 


Bedside Ionized Calcium (Kee)


  


  


  


  1.10


  1.12-1.32


mmol/l








Microbiology Results


11/4/17 Blood Culture, Received


          Pending


11/4/17 Blood Culture, Received


          Pending


11/4/17 Urine Culture, Received


          Pending





Diagnostic Radiology


CHEST ONE VIEW PORTABLE





CLINICAL HISTORY: fever    





COMPARISON STUDY:  9/30/2017





FINDINGS: The heart is enlarged. Bilateral neurostimulator generator packs are


visualized. There is asymmetric interstitial thickening, similar to the prior


study. There is no lobar consolidation. No pleural effusions are visualized.[ 





IMPRESSION: Asymmetric right greater than left interstitial thickening, similar


to the preceding study. No evidence of acute focal parenchymal consolidation





EKG


SR 86





Impression


Assessment and Plan


85 yo M with septic shock likely 2/2 pyelo. 





1.  Septic Shock-urinary source, in setting of UTI with CVA tenderness fevers 

and white count, considering pyelonephritis.  Lungs are clear of infection and 

no reported coughing recently. Leg is in cast but patient denies any pain.  

Bolused 2L IVF in the ER and required pressors.  To ICU for continued 

resuscitation overnight.  Cont broad spectrum abx with Vanc and Zosyn.  Blood 

and urine cultures are pending. 


2.  Carotid disease-cont ASA 81


3.  PAF-on coumadin and subtherapeutic.  Cont coumadin and amiodarone.  No AV 

mary blocking agents and rate is controlled. 


4.  Tremors s/p deep brain stimulators-these are currently turned off. 


5.  NEGAR in setting CKD STage III-baseline creat 1.3-1.6.  Cont with aggressive 

IVF resuscitation and repeat in am. 


6.  Hypothyroidism-cont Synthroid at home dose





DVT proph-coumadin and heparin while subtherapeutic


Full Code per discussion with patient, wife and daughter on admission


Dispo-to ICU. 





[facility]





Level of Care


Critical Care





Resuscitation Status


FULL RESUSCITATION





VTE Prophylaxis


VTE Risk Assessment Done? Y/N:  Yes


Risk Level:  Moderate


Given or contraindicated:  Unfractionated heparin SQ, Warfarin (Coumadin)





Note


Total Time:   Critical Care 30 - 74 minutes

## 2017-11-04 NOTE — PHARMACY PROGRESS NOTE
Pharmacy Abx Initial Consult


Date of Service


Nov 4, 2017.





Pharmacy Dosing Scope


Date of Consult:  11/4/17


Consultation requested by: Dr. Ervin





Pharmacy is consulted to initiate Vancomycin and Zosyn IV dosing therapy, order 

appropriate labs and adjust drug dose/frequency.





Subjective


The patient is a 86 year old male admitted on Nov 4, 2017 at 14:30.





Objective


Height (Feet):  5


Height (Inches):  11.00


Weight (Kilograms):  94.500


Vital Signs (Past 12Hrs)





Vital Signs Past 12 Hours








  Date Time  Temp Pulse Resp B/P (MAP) Pulse Ox O2 Delivery O2 Flow Rate FiO2


 


11/4/17 15:45  76 18 90/57 98 Nasal Cannula 2.0 


 


11/4/17 15:30  76 15 87/62 95   


 


11/4/17 15:15  79 19 94/56 91 Nasal Cannula 2.0 


 


11/4/17 15:00  68 19 98/55 94   


 


11/4/17 13:38    84/50  Nasal Cannula 2.0 


 


11/4/17 12:42  82 18  84   


 


11/4/17 12:27  85 24  100   


 


11/4/17 12:15    72/45    


 


11/4/17 12:12  84      


 


11/4/17 12:12  84 22  98   


 


11/4/17 12:00    78/49    


 


11/4/17 11:57  215 18 87/52    


 


11/4/17 11:46     94 Nasal Cannula 2.0 


 


11/4/17 11:46     90 Room Air  


 


11/4/17 11:42  88 17  95   


 


11/4/17 11:41 36.7 87 18 77/47 96 Nasal Cannula  


 


11/4/17 11:40    77/47    


 


11/4/17 11:36    81/55    








Lab Results (24Hrs)





Laboratory Tests (24 Hours)








Test


  11/4/17


12:50


 


White Blood Count


  13.25 K/uL


(4.8-10.8)  H


 


Red Blood Count


  3.36 M/uL


(4.7-6.1)  L


 


Hemoglobin


  9.5 g/dL


(14.0-18.0)  L


 


Hematocrit


  30.7 % (42-52)


L


 


Mean Corpuscular Volume


  91.4 fL


()


 


Mean Corpuscular Hemoglobin


  28.3 pg


(25-34)


 


Mean Corpuscular Hemoglobin


Concent 30.9 g/dl


(32-36)  L


 


Platelet Count


  190 K/uL


(130-400)


 


Mean Platelet Volume


  10.6 fL


(7.4-10.4)  H


 


Neutrophils (%) (Auto) 96.7 %  


 


Lymphocytes (%) (Auto) 1.5 %  


 


Monocytes (%) (Auto) 1.5 %  


 


Eosinophils (%) (Auto) 0.0 %  


 


Basophils (%) (Auto) 0.0 %  


 


Neutrophils # (Auto)


  12.81 K/uL


(1.4-6.5)  H


 


Lymphocytes # (Auto)


  0.20 K/uL


(1.2-3.4)  L


 


Monocytes # (Auto)


  0.20 K/uL


(0.11-0.59)


 


Eosinophils # (Auto)


  0.00 K/uL


(0-0.5)


 


Basophils # (Auto)


  0.00 K/uL


(0-0.2)


 


Total Creatine Kinase


  44 U/L


()








Micro Results











 Date/Time


Source Procedure


Growth Status


 


 


 11/4/17 12:45


Blood Blood Culture


Pending Received


 


 11/4/17 12:20


Blood Blood Culture


Pending Received


 


 11/4/17 11:57


Urine,Catheterized Urine Culture


Pending Received











Risk Factors for Resistance


* Resident in a nursing home or extended-care facility


* Hospitalization for 48 hours or more within the past 90 days





Assessment & Plan


Assessment





86 year old male admitted with apparent sepsis possibly associated with UTI 

source.  Broad spectrum empiric antibiotic coverage with IV Vancomycin and IV 

Zosyn have been initiated.  Patient with CKD-3 and blood and urine cultures 

pending.





Plan








IV Vancomycin and IV Zosyn for treatment of presumed Sepsis and possible UTI X 

48 hour course





Vancomycin IV 


* Loading dose: 1900 mg  (20 mg/kg)


* Maintenance dose: 1500 mg IV (15.9 mg/kg) every 24 hours


* Goal trough level for possible sepsis: 15 to 20 mcg/mL


* Vancomycin levels are deferred to see if antibiotic extend beyond initial 48 

hours empiric indication


* An extended dosing interval has been selected due to likelihood of drug 

accumulation in patient with h/o CKD. 





Piperacillin/tazobactam


* 4.5 g bolus administered over 30 minutes, then 4.5 g IV extended infusion 

every 8 hours for CrCl greater than 20 mL/min 


* Aggressive dosing selected due to critically ill status.


* Due to severe IV bag shortage please consider switch to Cefepime instead of 

Zosyn if clinical course allows.





Pharmacy will continue to follow and will adjust dose/frequency as necessary.  

Thank you.

## 2017-11-04 NOTE — EMERGENCY ROOM VISIT NOTE
History


Report prepared by Lynn:  Zenobia Villalba


Under the Supervision of:  Dr. Linden Dawkins M.D.


First contact with patient:  11:33


Chief Complaint:  ILLNESS


Stated Complaint:  ILLNESS





History of Present Illness


The patient is a 86 year old male who presents to the Emergency Room with 

complaints of a constant illness beginning PTA. The patient is a resident at 

[facility]. He was noted to be hypotensive and febrile this morning. He had a 

temperature of 101.5. EMS was called and the patient was brought to the ED for 

further evaluation. He was given Tylenol en route, which seems to be helping 

his fever. The patient is feeling tired. He reports occasional dysuria. He does 

not have any other complaints at this time. He denies chest pain, shortness of 

breath, cough, diarrhea, abdominal pain, and rash. The patient is on Coumadin.





   Source of History:  patient, EMS


   Onset:  PTA


   Position:  other (global)


   Symptom Intensity:  temperature of 101.5


   Quality:  other (illness)


   Timing:  constant


   Modifying Factors (Relieving):  tylenol


   Associated Symptoms:  + fevers, + urinary symptoms, + fatigue, No cough, No 

chest pain, No SOB, No abdominal pain, No diarrhea, No rash





Review of Systems


See HPI for pertinent positives & negatives. A total of 10 systems reviewed and 

were otherwise negative.





Past Medical & Surgical


Medical Problems:


(1) Atrial fibrillation with RVR


(2) Carotid artery surgery


(3) Gastroesophageal reflux disease


(4) Hyperlipidemia


(5) Hypotension


(6) Hypothyroidism


(7) Placement of deep brain stimulators








Family History


Non-pertinent due to advanced age.





Social History


Smoking Status:  Never Smoker


Alcohol Use:  none


Marital Status:  


Housing Status:  lives with family


Occupation Status:  retired





Current/Historical Medications


Scheduled


Amiodarone Hcl (Cordarone), 200 MG PO DAILY


Aspirin (Aspirin EC Low Dose), 81 MG PO DAILY


Atorvastatin (Lipitor), 10 MG PO DAILY


Docusate Sodium (Docusate Sodium), 100 MG PO BID


Levothyroxine Sodium (Synthroid), 100 MCG PO DAILY


Omeprazole (Prilosec), 40 MG PO DAILYBB


Probiotic Product (Probiotic), 1 CAP PO DAILY


Tamsulosin Hcl (Flomax), 0.4 MG PO HS


Warfarin Sod (Coumadin), 1 MG PO 3XWK


Warfarin Sod (Jantoven), 0.5 MG PO 4XWK





Scheduled PRN


Acetaminophen (Acetaminophen ER), 650 MG PO Q4 PRN for Pain or Fever


Acetaminophen (Tylenol), 650 MG RE Q4 PRN for Pain


Hydrocodone/Acetaminophen 5MG/325MG (Norco 5MG/325MG), 1-2 TAB PO Q6H PRN for 

Pain


Polyethylene (Miralax), 17 GM PO DAILY PRN for Constipation





Allergies


Coded Allergies:  


     Finasteride (Unverified  Adverse Reaction, Severe, RASH, 11/4/17)





Physical Exam


Vital Signs











  Date Time  Temp Pulse Resp B/P (MAP) Pulse Ox O2 Delivery O2 Flow Rate FiO2


 


11/4/17 13:38    84/50  Nasal Cannula 2.0 


 


11/4/17 12:42  82 18  84   


 


11/4/17 12:27  85 24  100   


 


11/4/17 12:15    72/45    


 


11/4/17 12:12  84      


 


11/4/17 12:12  84 22  98   


 


11/4/17 12:00    78/49    


 


11/4/17 11:57  215 18 87/52    


 


11/4/17 11:46     94 Nasal Cannula 2.0 


 


11/4/17 11:46     90 Room Air  


 


11/4/17 11:42  88 17  95   


 


11/4/17 11:41 36.7 87 18 77/47 96 Nasal Cannula  


 


11/4/17 11:40    77/47    


 


11/4/17 11:36    81/55    











Physical Exam


GENERAL: Patient is ill appearing and in minimal distress. Tired and dehydrated 

appearing. 


HEENT: No acute trauma, normocephalic atraumatic, mucous membranes moist, no 

nasal congestion, no scleral icterus.


NECK: No stridor, no adenopathy, no meningismus, trachea is midline.


LUNGS: No dyspnea. Crackles at the bilateral bases. No wheeze, no rhonchi.


HEART: Regular rate and rhythm.  No murmurs, rubs, gallops appreciated.


ABDOMEN: Soft, nontender, bowel sounds positive, no masses appreciated, no 

peritonitis.


BACK: No midline tenderness, no CVA tenderness


EXTREMITIES: Cast on the right lower leg, splint on the left lower leg, no 

cyanosis, no edema.


NEUROLOGIC: Alert and oriented, no acute motor or sensory deficits, no focal 

weakness, cranial nerves grossly intact.


SKIN: No rash, no jaundice, no diaphoresis.





Medical Decision & Procedures


ER Provider


Diagnostic Interpretation:


Radiology results and stated below per my review and radiologist interpretation:





CHEST ONE VIEW PORTABLE





CLINICAL HISTORY: fever    





COMPARISON STUDY:  9/30/2017





FINDINGS: The heart is enlarged. Bilateral neurostimulator generator packs are


visualized. There is asymmetric interstitial thickening, similar to the prior


study. There is no lobar consolidation. No pleural effusions are visualized.[ 





IMPRESSION: Asymmetric right greater than left interstitial thickening, similar


to the preceding study. No evidence of acute focal parenchymal consolidation











Electronically signed by:  Jake Duval M.D.


11/4/2017 12:08 PM





Dictated Date/Time:  11/4/2017 12:07 PM





Laboratory Results


11/4/17 12:50








Red Blood Count 3.36, Mean Corpuscular Volume 91.4, Mean Corpuscular Hemoglobin 

28.3, Mean Corpuscular Hemoglobin Concent 30.9, Mean Platelet Volume 10.6, 

Neutrophils (%) (Auto) 96.7, Lymphocytes (%) (Auto) 1.5, Monocytes (%) (Auto) 

1.5, Eosinophils (%) (Auto) 0.0, Basophils (%) (Auto) 0.0, Neutrophils # (Auto) 

12.81, Lymphocytes # (Auto) 0.20, Monocytes # (Auto) 0.20, Eosinophils # (Auto) 

0.00, Basophils # (Auto) 0.00





11/4/17 12:50

















Test


  11/4/17


11:57 11/4/17


12:50 11/4/17


13:00 11/4/17


13:03


 


Urine Color DK YELLOW    


 


Urine Appearance TURBID (CLEAR)    


 


Urine pH 5.0 (4.5-7.5)    


 


Urine Specific Gravity


  1.016


(1.000-1.030) 


  


  


 


 


Urine Protein 2+ (NEG)    


 


Urine Glucose (UA) NEG (NEG)    


 


Urine Ketones NEG (NEG)    


 


Urine Occult Blood 3+ (NEG)    


 


Urine Nitrite NEG (NEG)    


 


Urine Bilirubin NEG (NEG)    


 


Urine Urobilinogen NEG (NEG)    


 


Urine Leukocyte Esterase LARGE (NEG)    


 


Urine WBC (Auto) >30 /hpf (0-5)    


 


Urine RBC (Auto)


  10-30 /hpf


(0-4) 


  


  


 


 


Urine Hyaline Casts (Auto) 1-5 /lpf (0-5)    


 


Urine Epithelial Cells (Auto) 0-5 /lpf (0-5)    


 


Urine Bacteria (Auto) 4+ (NEG)    


 


White Blood Count


  


  13.25 K/uL


(4.8-10.8) 


  


 


 


Red Blood Count


  


  3.36 M/uL


(4.7-6.1) 


  


 


 


Hemoglobin


  


  9.5 g/dL


(14.0-18.0) 


  


 


 


Hematocrit  30.7 % (42-52)   


 


Mean Corpuscular Volume


  


  91.4 fL


() 


  


 


 


Mean Corpuscular Hemoglobin


  


  28.3 pg


(25-34) 


  


 


 


Mean Corpuscular Hemoglobin


Concent 


  30.9 g/dl


(32-36) 


  


 


 


Platelet Count


  


  190 K/uL


(130-400) 


  


 


 


Mean Platelet Volume


  


  10.6 fL


(7.4-10.4) 


  


 


 


Neutrophils (%) (Auto)  96.7 %   


 


Lymphocytes (%) (Auto)  1.5 %   


 


Monocytes (%) (Auto)  1.5 %   


 


Eosinophils (%) (Auto)  0.0 %   


 


Basophils (%) (Auto)  0.0 %   


 


Neutrophils # (Auto)


  


  12.81 K/uL


(1.4-6.5) 


  


 


 


Lymphocytes # (Auto)


  


  0.20 K/uL


(1.2-3.4) 


  


 


 


Monocytes # (Auto)


  


  0.20 K/uL


(0.11-0.59) 


  


 


 


Eosinophils # (Auto)


  


  0.00 K/uL


(0-0.5) 


  


 


 


Basophils # (Auto)


  


  0.00 K/uL


(0-0.2) 


  


 


 


RDW Standard Deviation


  


  54.2 fL


(36.4-46.3) 


  


 


 


RDW Coefficient of Variation


  


  16.3 %


(11.5-14.5) 


  


 


 


Immature Granulocyte % (Auto)  0.3 %   


 


Immature Granulocyte # (Auto)


  


  0.04 K/uL


(0.00-0.02) 


  


 


 


Toxic Vacuolation  2+   


 


Dohle Bodies  1+   


 


Polychromasia  1+   


 


Prothrombin Time


  


  14.0 SECONDS


(9.0-12.0) 


  


 


 


Prothromb Time International


Ratio 


  1.3 (0.9-1.1) 


  


  


 


 


Est Creatinine Clear Calc


Drug Dose 


  33.3 ml/min 


  


  


 


 


Estimated GFR (


American) 


  36.9 


  


  


 


 


Estimated GFR (Non-


American 


  31.8 


  


  


 


 


BUN/Creatinine Ratio  14.9 (10-20)   


 


Calcium Level


  


  7.9 mg/dl


(8.5-10.1) 


  


 


 


Magnesium Level


  


  1.6 mg/dl


(1.8-2.4) 


  


 


 


Total Bilirubin


  


  1.1 mg/dl


(0.2-1) 


  


 


 


Direct Bilirubin


  


  0.7 mg/dl


(0-0.2) 


  


 


 


Aspartate Amino Transf


(AST/SGOT) 


  103 U/L


(15-37) 


  


 


 


Alanine Aminotransferase


(ALT/SGPT) 


  64 U/L (12-78) 


  


  


 


 


Alkaline Phosphatase


  


  181 U/L


() 


  


 


 


Total Creatine Kinase


  


  44 U/L


() 


  


 


 


Creatine Kinase MB


  


  0.9 ng/ml


(0.5-3.6) 


  


 


 


Creatine Kinase MB Ratio  2.0 (0-3.0)   


 


Troponin I


  


  0.402 ng/ml


(0-0.045) 


  


 


 


Total Protein


  


  5.4 gm/dl


(6.4-8.2) 


  


 


 


Albumin


  


  1.9 gm/dl


(3.4-5.0) 


  


 


 


Bedside Lactic Acid Venous


  


  


  2.99 mmol/L


(0.90-1.70) 


 


 


Bedside Hemoglobin


  


  


  


  9.9 g/dl


(14.0-18.0)


 


Bedside Hematocrit    29 % (42-52) 


 


Bedside Sodium


  


  


  


  138 mEq/L


(135-144)


 


Bedside Potassium


  


  


  


  3.7 mEq/L


(3.3-5.0)


 


Bedside Chloride


  


  


  


  101 mEq/L


(101-112)


 


Bedside Total CO2


  


  


  


  25 mEq/l


(24-31)


 


Anion Gap


  


  


  


  16.0 mmol/L


(16-25)


 


Bedside Blood Urea Nitrogen


  


  


  


  24 mg/dl


(7-18)


 


Bedside Creatinine


  


  


  


  1.8 mg/dl


(0.6-1.3)


 


Bedside Glucose (other)


  


  


  


  86 mg/dl


(70-99)


 


Bedside Ionized Calcium (Kee)


  


  


  


  1.10 mmol/l


(1.12-1.32)





Laboratory results as reviewed by me.





Medications Administered











 Medications


  (Trade)  Dose


 Ordered  Sig/[facility]


 Route  Start Time


 Stop Time Status Last Admin


Dose Admin


 


 Sodium Chloride  2,000 ml @ 


 999 mls/hr  Q2H1M STAT


 IV  11/4/17 11:39


 11/4/17 13:39 DC 11/4/17 11:39


999 MLS/HR


 


 Piperacillin Sod/


 Tazobactam Sod


  (Zosyn Iv)  4.5 gm  NOW  STAT


 IV  11/4/17 12:18


 11/4/17 12:20 DC 11/4/17 12:58


4.5 GM


 


 Vancomycin HCl


 1900 mg/Sodium


 Chloride  538 ml @ 


 200 mls/hr  ONE  STAT


 IV  11/4/17 12:18


 11/4/17 14:59 DC 11/4/17 12:58


200 MLS/HR


 


 Norepinephrine


 Bitartrate 8 mg/


 Dextrose  508 ml @ 0


 mls/hr  Q0M STAT


 IV  11/4/17 12:56


 11/4/17 12:57 DC 11/4/17 13:35


7 MLS/HR











ECG


Indication:  other


Rate (beats per minute):  86


Rhythm:  normal sinus


Findings:  no acute ischemic change, no ectopy





ED Course


1135: The patient was evaluated in room C11B. A complete history and physical 

exam was performed.





1139: NSS 2000 ml @ 999 mls/hr IV





1218: Vancomycin HCl 1900 mg/Sodium Chloride 538 ml @ 200 mls/hr IV, Zosyn 4.5 

gm IV 





1227: I updated the patient and his family. Family notes that for the past 2-3 

days he has been having rigors but no other complaints. Nursing staff just got 

a second IV. He has had about 1.5 L of fluid. He denies any symptoms right now. 





1254: He has received 2 L of fluids. His BP is still 71 systolic. Levophed has 

been ordered. Nursing staff was unable to get labs until just now due to 

minimal blood return. 





1256: Norepinephrine Bitartrate 8 mg/Dextrose IV 





1302: I reassessed the patient at this time. He is resting more comfortably. I 

discussed the results and treatment plan with the patient and his family. I 

answered all pertaining questions that they had. They expressed understanding 

and verbalized agreement. 





1308: I spoke with Dr. Sellers. We discussed the patients case. The patient will 

be evaluated by the [facility] for further management. 





13:57 Re-evaluation: Hypotension improving.  Hospitalist at bedside.





Medical Decision


Differential: Viral, Pharyngitis, Cellulitis, Pneumonia, Influenza, Meningitis, 

Sepsis, Bacteremia, UTI/Pyelonephritis, Endocrine, Toxicologic, amongst other 

pathologies entertained.





86 yr old male recently in hospital for bilateral ankle fractures and living at 

nursing home currently arrives for evaluation of hypotension and fever.  On 

arrival 2 IVs established, blood cultures/LA ordered and 2 L NSS bolus 

initiated.  Difficulty getting labs given poor veins but did obtain IVs.  

Empiric abx ordered and hung.  After 2 L NSS patient noted to still be 

hypotensive thus Levophed ordered.  With NSS bolus and abx fluids at 30ml/KG 

thus given age felt it acceptable to start pressors at this time.  Especially 

once Lactic Acid revealed to be 3.  WBC moderately elevated.  UA clearly 

infected.  CXR clear, suspect hypoxia secondary to sepsis and mild somnolence 

unless it just hasn't blossomed yet.  Patient however does wake up and answer 

questions.  No fever here but documented fevers at nursing home and EMS, given 

Tylenol PTA.  Multiple re-evals discussing case with patient, and extensive 

family members.  As adequate access currently will hold off on central line 

while here in ED.  Patient's MAP > 65 with multiple repeat BPs once Levophed 

started.





Medication Reconcilliation


Current Medication List:  was personally reviewed by me





Blood Pressure Screening


Patient's blood pressure:  Low blood pressure





Consults


Time Called:  1303


Consulting Physician:  Dr. Sellers


Returned Call:  1308


I spoke with Dr. Sellers. We discussed the patients case. The patient will be 

evaluated by the [facility]ist Group for further management.





Impression





 Primary Impression:  


 Septic shock


 Additional Impression:  


 UTI (urinary tract infection)





Critical Care


I have personally spent greater than 90 minutes of critical care time in the 

direct management of this patient.  This was a life/limb threatening event.  

This includes time spent evaluating patient, direct bedside care, chart review, 

placing orders, interpretation of diagnostic studies, discussion with 

consultants, patient, and family members, as well as other required patient 

management activities.


This 90 minutes is in excess of all separately billable procedures.





Scribe Attestation


The scribe's documentation has been prepared under my direction and personally 

reviewed by me in its entirety. I confirm that the note above accurately 

reflects all work, treatment, procedures, and medical decision making performed 

by me.





Departure Information


Dispostion


Being Evaluated By Hospitalist





Referrals


[place] (PCP)





Patient Instructions


[facility]





Problem Qualifiers

## 2017-11-04 NOTE — DIAGNOSTIC IMAGING REPORT
CHEST ONE VIEW PORTABLE



CLINICAL HISTORY: fever    



COMPARISON STUDY:  9/30/2017



FINDINGS: The heart is enlarged. Bilateral neurostimulator generator packs are

visualized. There is asymmetric interstitial thickening, similar to the prior

study. There is no lobar consolidation. No pleural effusions are visualized.[ 



IMPRESSION: Asymmetric right greater than left interstitial thickening, similar

to the preceding study. No evidence of acute focal parenchymal consolidation







Electronically signed by:  Jake Duval M.D.

11/4/2017 12:08 PM



Dictated Date/Time:  11/4/2017 12:07 PM

## 2017-11-04 NOTE — DIAGNOSTIC IMAGING REPORT
CT SCAN OF THE ABDOMEN AND PELVIS WITHOUT CONTRAST



CLINICAL HISTORY: Low back and perineal pain. History of catheter repositioning.

POSSIBLE PELVIC HEMATOMA.



COMPARISON STUDY:  5/15/2013 



TECHNIQUE: CT scan of the abdomen and pelvis was performed from the lung bases

to the proximal femurs. Images are reviewed in the axial, sagittal, and coronal

planes. IV contrast was not administered for this examination.  A dose lowering

technique was utilized adhering to the principles of ALARA.





CT DOSE: 1239.77 mGy.cm

FINDINGS:



The examination is compromised due to motion artifact.



Lower chest: There are small bilateral pleural effusions. There is septal edema.

There are right basal airspace opacities, atelectatic versus inflammatory. There

is a hiatal hernia.



Liver: No space-occupying masses are visualized on this noncontrast study.



Gallbladder: Surgically absent



Spleen: Normal in size and attenuation.



Pancreas: No masses are visualized in this noncontrast study. There is mild

peripancreatic stranding, not dissimilar to other areas of stranding within the

peritoneum.



Adrenal glands: Unremarkable.



Kidneys: No renal, ureteral, or bladder calculi are visualized. There is no

hydronephrosis



Bowel: There are multiple colonic diverticula present. There is no evidence of

acute diverticulitis. Subtle peridiverticular inflammatory changes could be

obscured due to the moderate motion artifact on the study.



Peritoneum: There is mild peritoneal infiltration/stranding. This may be

chronic. There is no ascites. There is no free intraperitoneal air.



Vasculature: The abdominal aorta is normal in course and caliber.



Adenopathy: None.



Pelvic viscera: There is indwelling Johnson catheter. The prostate is enlarged.

There is no evidence of pelvic hematoma. There is a tiny right inguinal hernia

versus lipomatous inguinal canal



Skeletal structures: No destructive osseous lesions are seen.



IMPRESSION:  

1. Technically limited study secondary to patient motion artifact, and the lack

of intravenous and oral contrast

2. No evidence of bowel obstruction. No evidence of free air

3. No renal, ureteral, or bladder calculi identified

4. Johnson catheter within the bladder

5. No evidence of pelvic hematoma

6. Small bilateral pleural effusions.

7. Mild septal thickening likely secondary to interstitial edema

8. Right lower lobe airspace opacities, atelectatic versus

infectious/inflammatory







Electronically signed by:  Jake Duval M.D.

11/4/2017 9:26 PM



Dictated Date/Time:  11/4/2017 9:19 PM

## 2017-11-05 VITALS — DIASTOLIC BLOOD PRESSURE: 82 MMHG | HEART RATE: 63 BPM | OXYGEN SATURATION: 99 % | SYSTOLIC BLOOD PRESSURE: 127 MMHG

## 2017-11-05 VITALS — SYSTOLIC BLOOD PRESSURE: 145 MMHG | DIASTOLIC BLOOD PRESSURE: 77 MMHG | OXYGEN SATURATION: 100 % | HEART RATE: 69 BPM

## 2017-11-05 VITALS — SYSTOLIC BLOOD PRESSURE: 130 MMHG | OXYGEN SATURATION: 99 % | DIASTOLIC BLOOD PRESSURE: 78 MMHG | HEART RATE: 71 BPM

## 2017-11-05 VITALS — HEART RATE: 70 BPM | SYSTOLIC BLOOD PRESSURE: 116 MMHG | DIASTOLIC BLOOD PRESSURE: 69 MMHG | OXYGEN SATURATION: 99 %

## 2017-11-05 VITALS — OXYGEN SATURATION: 96 % | HEART RATE: 71 BPM | DIASTOLIC BLOOD PRESSURE: 76 MMHG | SYSTOLIC BLOOD PRESSURE: 119 MMHG

## 2017-11-05 VITALS — HEART RATE: 63 BPM | DIASTOLIC BLOOD PRESSURE: 63 MMHG | SYSTOLIC BLOOD PRESSURE: 96 MMHG | OXYGEN SATURATION: 92 %

## 2017-11-05 VITALS — HEART RATE: 61 BPM | DIASTOLIC BLOOD PRESSURE: 55 MMHG | SYSTOLIC BLOOD PRESSURE: 92 MMHG | OXYGEN SATURATION: 98 %

## 2017-11-05 VITALS — HEART RATE: 63 BPM | DIASTOLIC BLOOD PRESSURE: 73 MMHG | SYSTOLIC BLOOD PRESSURE: 131 MMHG | OXYGEN SATURATION: 95 %

## 2017-11-05 VITALS
TEMPERATURE: 97.88 F | DIASTOLIC BLOOD PRESSURE: 75 MMHG | HEART RATE: 59 BPM | SYSTOLIC BLOOD PRESSURE: 124 MMHG | OXYGEN SATURATION: 94 %

## 2017-11-05 VITALS — SYSTOLIC BLOOD PRESSURE: 71 MMHG | HEART RATE: 83 BPM | DIASTOLIC BLOOD PRESSURE: 49 MMHG | OXYGEN SATURATION: 97 %

## 2017-11-05 VITALS — DIASTOLIC BLOOD PRESSURE: 47 MMHG | HEART RATE: 81 BPM | SYSTOLIC BLOOD PRESSURE: 71 MMHG

## 2017-11-05 VITALS — SYSTOLIC BLOOD PRESSURE: 94 MMHG | OXYGEN SATURATION: 90 % | HEART RATE: 76 BPM | DIASTOLIC BLOOD PRESSURE: 51 MMHG

## 2017-11-05 VITALS — SYSTOLIC BLOOD PRESSURE: 114 MMHG | HEART RATE: 63 BPM | OXYGEN SATURATION: 97 % | DIASTOLIC BLOOD PRESSURE: 66 MMHG

## 2017-11-05 VITALS — OXYGEN SATURATION: 98 %

## 2017-11-05 VITALS — DIASTOLIC BLOOD PRESSURE: 57 MMHG | SYSTOLIC BLOOD PRESSURE: 100 MMHG | HEART RATE: 73 BPM | OXYGEN SATURATION: 97 %

## 2017-11-05 VITALS — DIASTOLIC BLOOD PRESSURE: 68 MMHG | HEART RATE: 60 BPM | SYSTOLIC BLOOD PRESSURE: 109 MMHG | OXYGEN SATURATION: 95 %

## 2017-11-05 VITALS — SYSTOLIC BLOOD PRESSURE: 105 MMHG | HEART RATE: 81 BPM | DIASTOLIC BLOOD PRESSURE: 50 MMHG

## 2017-11-05 VITALS — SYSTOLIC BLOOD PRESSURE: 100 MMHG | OXYGEN SATURATION: 92 % | DIASTOLIC BLOOD PRESSURE: 55 MMHG | HEART RATE: 75 BPM

## 2017-11-05 VITALS — OXYGEN SATURATION: 100 % | HEART RATE: 67 BPM | SYSTOLIC BLOOD PRESSURE: 123 MMHG | DIASTOLIC BLOOD PRESSURE: 74 MMHG

## 2017-11-05 VITALS
TEMPERATURE: 97.52 F | DIASTOLIC BLOOD PRESSURE: 62 MMHG | SYSTOLIC BLOOD PRESSURE: 108 MMHG | OXYGEN SATURATION: 96 % | HEART RATE: 60 BPM

## 2017-11-05 VITALS — DIASTOLIC BLOOD PRESSURE: 63 MMHG | SYSTOLIC BLOOD PRESSURE: 105 MMHG | HEART RATE: 63 BPM | OXYGEN SATURATION: 91 %

## 2017-11-05 VITALS — DIASTOLIC BLOOD PRESSURE: 49 MMHG | HEART RATE: 82 BPM | SYSTOLIC BLOOD PRESSURE: 68 MMHG

## 2017-11-05 VITALS — SYSTOLIC BLOOD PRESSURE: 120 MMHG | OXYGEN SATURATION: 96 % | DIASTOLIC BLOOD PRESSURE: 71 MMHG | HEART RATE: 66 BPM

## 2017-11-05 VITALS — HEART RATE: 78 BPM | OXYGEN SATURATION: 95 % | SYSTOLIC BLOOD PRESSURE: 90 MMHG | DIASTOLIC BLOOD PRESSURE: 55 MMHG

## 2017-11-05 VITALS
OXYGEN SATURATION: 97 % | SYSTOLIC BLOOD PRESSURE: 100 MMHG | TEMPERATURE: 98.06 F | HEART RATE: 64 BPM | DIASTOLIC BLOOD PRESSURE: 66 MMHG

## 2017-11-05 VITALS — HEART RATE: 82 BPM | SYSTOLIC BLOOD PRESSURE: 71 MMHG | DIASTOLIC BLOOD PRESSURE: 49 MMHG

## 2017-11-05 VITALS — OXYGEN SATURATION: 96 % | SYSTOLIC BLOOD PRESSURE: 105 MMHG | HEART RATE: 59 BPM | DIASTOLIC BLOOD PRESSURE: 64 MMHG

## 2017-11-05 VITALS — DIASTOLIC BLOOD PRESSURE: 54 MMHG | OXYGEN SATURATION: 88 % | SYSTOLIC BLOOD PRESSURE: 90 MMHG | HEART RATE: 74 BPM

## 2017-11-05 VITALS — OXYGEN SATURATION: 97 % | SYSTOLIC BLOOD PRESSURE: 87 MMHG | DIASTOLIC BLOOD PRESSURE: 52 MMHG | HEART RATE: 83 BPM

## 2017-11-05 VITALS — SYSTOLIC BLOOD PRESSURE: 90 MMHG | OXYGEN SATURATION: 92 % | DIASTOLIC BLOOD PRESSURE: 62 MMHG | HEART RATE: 76 BPM

## 2017-11-05 VITALS — HEART RATE: 84 BPM | OXYGEN SATURATION: 93 % | SYSTOLIC BLOOD PRESSURE: 119 MMHG | DIASTOLIC BLOOD PRESSURE: 62 MMHG

## 2017-11-05 VITALS — OXYGEN SATURATION: 96 % | HEART RATE: 62 BPM | SYSTOLIC BLOOD PRESSURE: 100 MMHG | DIASTOLIC BLOOD PRESSURE: 62 MMHG

## 2017-11-05 VITALS — SYSTOLIC BLOOD PRESSURE: 101 MMHG | OXYGEN SATURATION: 97 % | DIASTOLIC BLOOD PRESSURE: 66 MMHG | HEART RATE: 70 BPM

## 2017-11-05 VITALS — DIASTOLIC BLOOD PRESSURE: 73 MMHG | OXYGEN SATURATION: 95 % | SYSTOLIC BLOOD PRESSURE: 128 MMHG | HEART RATE: 61 BPM

## 2017-11-05 VITALS — DIASTOLIC BLOOD PRESSURE: 70 MMHG | HEART RATE: 65 BPM | OXYGEN SATURATION: 97 % | SYSTOLIC BLOOD PRESSURE: 104 MMHG

## 2017-11-05 VITALS
OXYGEN SATURATION: 97 % | DIASTOLIC BLOOD PRESSURE: 50 MMHG | SYSTOLIC BLOOD PRESSURE: 74 MMHG | TEMPERATURE: 97.34 F | HEART RATE: 83 BPM

## 2017-11-05 VITALS — SYSTOLIC BLOOD PRESSURE: 116 MMHG | OXYGEN SATURATION: 95 % | DIASTOLIC BLOOD PRESSURE: 71 MMHG | HEART RATE: 61 BPM

## 2017-11-05 VITALS — SYSTOLIC BLOOD PRESSURE: 128 MMHG | OXYGEN SATURATION: 87 % | HEART RATE: 58 BPM | DIASTOLIC BLOOD PRESSURE: 67 MMHG

## 2017-11-05 VITALS — SYSTOLIC BLOOD PRESSURE: 86 MMHG | HEART RATE: 76 BPM | OXYGEN SATURATION: 93 % | DIASTOLIC BLOOD PRESSURE: 54 MMHG

## 2017-11-05 VITALS — SYSTOLIC BLOOD PRESSURE: 79 MMHG | HEART RATE: 80 BPM | DIASTOLIC BLOOD PRESSURE: 55 MMHG

## 2017-11-05 VITALS — HEART RATE: 82 BPM | SYSTOLIC BLOOD PRESSURE: 89 MMHG | DIASTOLIC BLOOD PRESSURE: 52 MMHG

## 2017-11-05 VITALS — SYSTOLIC BLOOD PRESSURE: 84 MMHG | DIASTOLIC BLOOD PRESSURE: 55 MMHG | HEART RATE: 83 BPM | OXYGEN SATURATION: 98 %

## 2017-11-05 VITALS — OXYGEN SATURATION: 95 % | SYSTOLIC BLOOD PRESSURE: 67 MMHG | DIASTOLIC BLOOD PRESSURE: 45 MMHG | HEART RATE: 81 BPM

## 2017-11-05 VITALS
SYSTOLIC BLOOD PRESSURE: 129 MMHG | DIASTOLIC BLOOD PRESSURE: 77 MMHG | TEMPERATURE: 97.52 F | HEART RATE: 65 BPM | OXYGEN SATURATION: 99 %

## 2017-11-05 VITALS — SYSTOLIC BLOOD PRESSURE: 108 MMHG | DIASTOLIC BLOOD PRESSURE: 68 MMHG | OXYGEN SATURATION: 96 % | HEART RATE: 64 BPM

## 2017-11-05 LAB
ALP SERPL-CCNC: 159 U/L (ref 45–117)
ALT SERPL-CCNC: 148 U/L (ref 12–78)
ANION GAP SERPL CALC-SCNC: 11 MMOL/L (ref 3–11)
AST SERPL-CCNC: 201 U/L (ref 15–37)
BASOPHILS # BLD: 0.01 K/UL (ref 0–0.2)
BASOPHILS NFR BLD: 0 %
BUN SERPL-MCNC: 27 MG/DL (ref 7–18)
BUN/CREAT SERPL: 14.1 (ref 10–20)
BURR CELLS BLD QL SMEAR: (no result)
CALCIUM SERPL-MCNC: 6.9 MG/DL (ref 8.5–10.1)
CHLORIDE SERPL-SCNC: 103 MMOL/L (ref 98–107)
CO2 SERPL-SCNC: 23 MMOL/L (ref 21–32)
COMPLETE: YES
CREAT CL PREDICTED SERPL C-G-VRATE: 32.9 ML/MIN
CREAT SERPL-MCNC: 1.93 MG/DL (ref 0.6–1.4)
DOHLE BOD BLD QL SMEAR: (no result)
EOSINOPHIL NFR BLD AUTO: 186 K/UL (ref 130–400)
GLUCOSE SERPL-MCNC: 151 MG/DL (ref 70–99)
HCT VFR BLD CALC: 31.4 % (ref 42–52)
IG%: 2 %
IMM GRANULOCYTES NFR BLD AUTO: 1.7 %
INR PPP: 1.3 (ref 0.9–1.1)
LYMPHOCYTES # BLD: 0.45 K/UL (ref 1.2–3.4)
MCH RBC QN AUTO: 28 PG (ref 25–34)
MCHC RBC AUTO-ENTMCNC: 30.9 G/DL (ref 32–36)
MCV RBC AUTO: 90.5 FL (ref 80–100)
MONOCYTES NFR BLD: 2.1 %
NEUTROPHILS # BLD AUTO: 0 %
NEUTROPHILS NFR BLD AUTO: 94.2 %
PLATELET # BLD EST: NORMAL 10*3/UL
PMV BLD AUTO: 10.9 FL (ref 7.4–10.4)
POTASSIUM SERPL-SCNC: 4.6 MMOL/L (ref 3.5–5.1)
PROTHROMBIN TIME: 14.6 SECONDS (ref 9–12)
RBC # BLD AUTO: 3.47 M/UL (ref 4.7–6.1)
SODIUM SERPL-SCNC: 137 MMOL/L (ref 136–145)
TSH SERPL-ACNC: 4.57 UIU/ML (ref 0.3–4.5)
VACUOLIZATION: (no result)
WBC # BLD AUTO: 26.95 K/UL (ref 4.8–10.8)

## 2017-11-05 RX ADMIN — AMIODARONE HYDROCHLORIDE SCH MG: 200 TABLET ORAL at 08:41

## 2017-11-05 RX ADMIN — PANTOPRAZOLE SCH MG: 40 TABLET, DELAYED RELEASE ORAL at 08:41

## 2017-11-05 RX ADMIN — SODIUM CHLORIDE, SODIUM ACETATE ANHYDROUS, SODIUM GLUCONATE, POTASSIUM CHLORIDE, AND MAGNESIUM CHLORIDE SCH MLS/HR: 526; 222; 502; 37; 30 INJECTION, SOLUTION INTRAVENOUS at 17:47

## 2017-11-05 RX ADMIN — HEPARIN SODIUM SCH UNIT: 10000 INJECTION, SOLUTION INTRAVENOUS; SUBCUTANEOUS at 08:44

## 2017-11-05 RX ADMIN — DOCUSATE SODIUM SCH MG: 100 CAPSULE, LIQUID FILLED ORAL at 20:47

## 2017-11-05 RX ADMIN — WARFARIN SODIUM SCH MG: 1 TABLET ORAL at 16:02

## 2017-11-05 RX ADMIN — Medication SCH MG: at 08:41

## 2017-11-05 RX ADMIN — PIPERACILLIN SODIUM, TAZOBACTAM SODIUM SCH MLS/HR: 4; .5 INJECTION, POWDER, LYOPHILIZED, FOR SOLUTION INTRAVENOUS at 19:53

## 2017-11-05 RX ADMIN — SODIUM CHLORIDE, SODIUM ACETATE ANHYDROUS, SODIUM GLUCONATE, POTASSIUM CHLORIDE, AND MAGNESIUM CHLORIDE SCH MLS/HR: 526; 222; 502; 37; 30 INJECTION, SOLUTION INTRAVENOUS at 11:40

## 2017-11-05 RX ADMIN — LEVOTHYROXINE SODIUM SCH MCG: 100 TABLET ORAL at 06:11

## 2017-11-05 RX ADMIN — HEPARIN SODIUM SCH UNIT: 10000 INJECTION, SOLUTION INTRAVENOUS; SUBCUTANEOUS at 20:49

## 2017-11-05 RX ADMIN — DOCUSATE SODIUM SCH MG: 100 CAPSULE, LIQUID FILLED ORAL at 08:41

## 2017-11-05 RX ADMIN — PIPERACILLIN SODIUM, TAZOBACTAM SODIUM SCH MLS/HR: 4; .5 INJECTION, POWDER, LYOPHILIZED, FOR SOLUTION INTRAVENOUS at 12:41

## 2017-11-05 RX ADMIN — PIPERACILLIN SODIUM, TAZOBACTAM SODIUM SCH MLS/HR: 4; .5 INJECTION, POWDER, LYOPHILIZED, FOR SOLUTION INTRAVENOUS at 04:00

## 2017-11-05 RX ADMIN — TAMSULOSIN HYDROCHLORIDE SCH MG: 0.4 CAPSULE ORAL at 20:48

## 2017-11-05 RX ADMIN — SODIUM CHLORIDE, SODIUM ACETATE ANHYDROUS, SODIUM GLUCONATE, POTASSIUM CHLORIDE, AND MAGNESIUM CHLORIDE SCH MLS/HR: 526; 222; 502; 37; 30 INJECTION, SOLUTION INTRAVENOUS at 03:10

## 2017-11-05 RX ADMIN — ATORVASTATIN CALCIUM SCH MG: 10 TABLET, FILM COATED ORAL at 08:41

## 2017-11-05 RX ADMIN — HYDROCORTISONE SODIUM SUCCINATE SCH MLS/MIN: 100 INJECTION, POWDER, FOR SOLUTION INTRAMUSCULAR; INTRAVENOUS at 17:47

## 2017-11-05 NOTE — PROGRESS NOTE
Medicine Progress Note


Date & Time of Visit:


Nov 5, 2017 at 14:20.


Subjective


85 yo M with septic shock likely 2/2 pyelo. 





Improved today


Denies pain


Denies work of breathing after IVF overnight


Hydrocortisone given overnight


Off norepi at 0930 this morning and maintaining pressure.





Objective





Last 8 Hrs








  Date Time  Temp Pulse Resp B/P (MAP) Pulse Ox O2 Delivery O2 Flow Rate FiO2


 


11/5/17 14:01  61 16 92/55 (67) 98 Room Air  


 


11/5/17 13:00  62 13 100/62 (75) 96 Room Air  


 


11/5/17 12:00 36.7 64 17 100/66 (77) 97 Room Air  


 


11/5/17 12:00      Room Air  


 


11/5/17 11:30  64 15 108/68 (81) 96 Room Air  


 


11/5/17 11:00  63 13 114/66 (82) 97 Room Air  


 


11/5/17 10:00  66 15 120/71 (87) 96 Room Air  


 


11/5/17 09:31  63 15 96/63 (74) 92 Room Air  


 


11/5/17 09:00  63 14 131/73 (92) 95 Room Air  


 


11/5/17 08:32  71 16 119/76 (90) 96 Nasal Cannula 2.0 


 


11/5/17 08:00 36.4 65 17 129/77 (94) 99 Nasal Cannula 2.0 


 


11/5/17 08:00      Nasal Cannula 2.0 


 


11/5/17 07:31  65 15 104/70 (81) 97 Nasal Cannula 2.0 


 


11/5/17 07:01  70 14 101/66 (78) 97 Nasal Cannula 2.0 








Physical Exam:


GEN: WNWD, in no acute distress, alert and appropriate, lying supine in bed, 

struggles to sit up without assistance. 


HEENT: NC/AT, normal sclerae, mucous membranes moist


CARDIO: reg rate, S1/2 heard without m/g/r


LUNGS: CTA bilaterally, no crackles, rales or wheezes, good diaphragmatic 

excursion


ABD: soft, non-tender, non-distended, no rebound or guarding, +BS, no CVA 

tenderness bilaterally


EXTREMITY: RP and DP palpable 2+ bilat, no LE swelling or edema, extremities 

are warm and well-perfused


N/M:  alert and appropriate, +bilateral hand tremors noted. 


SKIN:  warm and dry


Laboratory Results:


11/5/17 06:42








Red Blood Count 3.47, Mean Corpuscular Volume 90.5, Mean Corpuscular Hemoglobin 

28.0, Mean Corpuscular Hemoglobin Concent 30.9, Mean Platelet Volume 10.9, 

Neutrophils (%) (Auto) 94.2, Lymphocytes (%) (Auto) 1.7, Monocytes (%) (Auto) 

2.1, Eosinophils (%) (Auto) 0.0, Basophils (%) (Auto) 0.0, Neutrophils # (Auto) 

25.39, Lymphocytes # (Auto) 0.45, Monocytes # (Auto) 0.57, Eosinophils # (Auto) 

0.00, Basophils # (Auto) 0.01





11/5/17 06:42

















Test


  11/4/17


11:57 11/4/17


12:50 11/4/17


13:00 11/4/17


13:03


 


Urine Color DK YELLOW    


 


Urine Appearance TURBID (CLEAR)    


 


Urine pH 5.0 (4.5-7.5)    


 


Urine Specific Gravity


  1.016


(1.000-1.030) 


  


  


 


 


Urine Protein 2+ (NEG)    


 


Urine Glucose (UA) NEG (NEG)    


 


Urine Ketones NEG (NEG)    


 


Urine Occult Blood 3+ (NEG)    


 


Urine Nitrite NEG (NEG)    


 


Urine Bilirubin NEG (NEG)    


 


Urine Urobilinogen NEG (NEG)    


 


Urine Leukocyte Esterase LARGE (NEG)    


 


Urine WBC (Auto) >30 /hpf (0-5)    


 


Urine RBC (Auto)


  10-30 /hpf


(0-4) 


  


  


 


 


Urine Hyaline Casts (Auto) 1-5 /lpf (0-5)    


 


Urine Epithelial Cells (Auto) 0-5 /lpf (0-5)    


 


Urine Bacteria (Auto) 4+ (NEG)    


 


Polychromasia  1+   


 


Magnesium Level


  


  1.6 mg/dl


(1.8-2.4) 


  


 


 


Total Creatine Kinase


  


  44 U/L


() 


  


 


 


Creatine Kinase MB


  


  0.9 ng/ml


(0.5-3.6) 


  


 


 


Creatine Kinase MB Ratio  2.0 (0-3.0)   


 


Bedside Lactic Acid Venous


  


  


  2.99 mmol/L


(0.90-1.70) 


 


 


Bedside Hemoglobin


  


  


  


  9.9 g/dl


(14.0-18.0)


 


Bedside Hematocrit    29 % (42-52) 


 


Bedside Sodium


  


  


  


  138 mEq/L


(135-144)


 


Bedside Potassium


  


  


  


  3.7 mEq/L


(3.3-5.0)


 


Bedside Chloride


  


  


  


  101 mEq/L


(101-112)


 


Bedside Total CO2


  


  


  


  25 mEq/l


(24-31)


 


Bedside Blood Urea Nitrogen


  


  


  


  24 mg/dl


(7-18)


 


Bedside Creatinine


  


  


  


  1.8 mg/dl


(0.6-1.3)


 


Bedside Glucose (other)


  


  


  


  86 mg/dl


(70-99)


 


Bedside Ionized Calcium (Kee)


  


  


  


  1.10 mmol/l


(1.12-1.32)


 


Test


  11/5/17


06:42 11/5/17


11:05 11/5/17


14:08 


 


 


White Blood Count


  26.95 K/uL


(4.8-10.8) 


  


  


 


 


Red Blood Count


  3.47 M/uL


(4.7-6.1) 


  


  


 


 


Hemoglobin


  9.7 g/dL


(14.0-18.0) 


  


  


 


 


Hematocrit 31.4 % (42-52)    


 


Mean Corpuscular Volume


  90.5 fL


() 


  


  


 


 


Mean Corpuscular Hemoglobin


  28.0 pg


(25-34) 


  


  


 


 


Mean Corpuscular Hemoglobin


Concent 30.9 g/dl


(32-36) 


  


  


 


 


Platelet Count


  186 K/uL


(130-400) 


  


  


 


 


Mean Platelet Volume


  10.9 fL


(7.4-10.4) 


  


  


 


 


Neutrophils (%) (Auto) 94.2 %    


 


Lymphocytes (%) (Auto) 1.7 %    


 


Monocytes (%) (Auto) 2.1 %    


 


Eosinophils (%) (Auto) 0.0 %    


 


Basophils (%) (Auto) 0.0 %    


 


Neutrophils # (Auto)


  25.39 K/uL


(1.4-6.5) 


  


  


 


 


Lymphocytes # (Auto)


  0.45 K/uL


(1.2-3.4) 


  


  


 


 


Monocytes # (Auto)


  0.57 K/uL


(0.11-0.59) 


  


  


 


 


Eosinophils # (Auto)


  0.00 K/uL


(0-0.5) 


  


  


 


 


Basophils # (Auto)


  0.01 K/uL


(0-0.2) 


  


  


 


 


RDW Standard Deviation


  55.2 fL


(36.4-46.3) 


  


  


 


 


RDW Coefficient of Variation


  16.8 %


(11.5-14.5) 


  


  


 


 


Immature Granulocyte % (Auto) 2.0 %    


 


Immature Granulocyte # (Auto)


  0.53 K/uL


(0.00-0.02) 


  


  


 


 


Toxic Vacuolation 2+    


 


Dohle Bodies 1+    


 


Platelet Estimate NORMAL    


 


Echinocytes 1+    


 


Prothrombin Time


  14.6 SECONDS


(9.0-12.0) 


  


  


 


 


Prothromb Time International


Ratio 1.3 (0.9-1.1) 


  


  


  


 


 


Anion Gap


  11.0 mmol/L


(3-11) 


  


  


 


 


Est Creatinine Clear Calc


Drug Dose 32.9 ml/min 


  


  


  


 


 


Estimated GFR (


American) 35.5 


  


  


  


 


 


Estimated GFR (Non-


American 30.6 


  


  


  


 


 


BUN/Creatinine Ratio 14.1 (10-20)    


 


Lactic Acid Level


  2.6 mmol/L


(0.4-2.0) 


  


  


 


 


Calcium Level


  6.9 mg/dl


(8.5-10.1) 


  


  


 


 


Total Bilirubin


  0.9 mg/dl


(0.2-1) 


  


  


 


 


Direct Bilirubin


  0.6 mg/dl


(0-0.2) 


  


  


 


 


Aspartate Amino Transf


(AST/SGOT) 201 U/L


(15-37) 


  


  


 


 


Alanine Aminotransferase


(ALT/SGPT) 148 U/L


(12-78) 


  


  


 


 


Alkaline Phosphatase


  159 U/L


() 


  


  


 


 


Total Protein


  5.6 gm/dl


(6.4-8.2) 


  


  


 


 


Albumin


  2.1 gm/dl


(3.4-5.0) 


  


  


 


 


Procalcitonin


  > 200.00 ng/ml


(0-0.5) 


  


  


 


 


Bedside Glucose


  


  116 mg/dl


(70-99) 


  


 














 Date/Time


Source Procedure


Growth Status


 


 


 11/4/17 12:45


Blood Blood Culture


Pending Received


 


 11/4/17 16:40


Nasal MRSA DNA Surveillance Screen - Final


Specimen Negative for MRSA by DNA Probe Complete


 


 11/4/17 11:57


Urine,Catheterized Urine Culture - Preliminary


Gram Negative Bacilli Resulted








Last 24 Hours








Test


  11/5/17


06:42 11/5/17


11:05 11/5/17


14:08


 


White Blood Count 26.95 K/uL   


 


Red Blood Count 3.47 M/uL   


 


Hemoglobin 9.7 g/dL   


 


Hematocrit 31.4 %   


 


Mean Corpuscular Volume 90.5 fL   


 


Mean Corpuscular Hemoglobin 28.0 pg   


 


Mean Corpuscular Hemoglobin


Concent 30.9 g/dl 


  


  


 


 


Platelet Count 186 K/uL   


 


Mean Platelet Volume 10.9 fL   


 


Neutrophils (%) (Auto) 94.2 %   


 


Lymphocytes (%) (Auto) 1.7 %   


 


Monocytes (%) (Auto) 2.1 %   


 


Eosinophils (%) (Auto) 0.0 %   


 


Basophils (%) (Auto) 0.0 %   


 


Neutrophils # (Auto) 25.39 K/uL   


 


Lymphocytes # (Auto) 0.45 K/uL   


 


Monocytes # (Auto) 0.57 K/uL   


 


Eosinophils # (Auto) 0.00 K/uL   


 


Basophils # (Auto) 0.01 K/uL   


 


RDW Standard Deviation 55.2 fL   


 


RDW Coefficient of Variation 16.8 %   


 


Immature Granulocyte % (Auto) 2.0 %   


 


Immature Granulocyte # (Auto) 0.53 K/uL   


 


Toxic Vacuolation 2+   


 


Dohle Bodies 1+   


 


Platelet Estimate NORMAL   


 


Echinocytes 1+   


 


Prothrombin Time 14.6 SECONDS   


 


Prothromb Time International


Ratio 1.3 


  


  


 


 


Sodium Level 137 mmol/L   


 


Potassium Level 4.6 mmol/L   


 


Chloride Level 103 mmol/L   


 


Carbon Dioxide Level 23 mmol/L   


 


Anion Gap 11.0 mmol/L   


 


Blood Urea Nitrogen 27 mg/dl   


 


Creatinine 1.93 mg/dl   


 


Est Creatinine Clear Calc


Drug Dose 32.9 ml/min 


  


  


 


 


Estimated GFR (


American) 35.5 


  


  


 


 


Estimated GFR (Non-


American 30.6 


  


  


 


 


BUN/Creatinine Ratio 14.1   


 


Random Glucose 151 mg/dl   


 


Lactic Acid Level 2.6 mmol/L   


 


Calcium Level 6.9 mg/dl   


 


Total Bilirubin 0.9 mg/dl   


 


Direct Bilirubin 0.6 mg/dl   


 


Aspartate Amino Transf


(AST/SGOT) 201 U/L 


  


  


 


 


Alanine Aminotransferase


(ALT/SGPT) 148 U/L 


  


  


 


 


Alkaline Phosphatase 159 U/L   


 


Troponin I 0.224 ng/ml   


 


Total Protein 5.6 gm/dl   


 


Albumin 2.1 gm/dl   


 


Procalcitonin > 200.00 ng/ml   


 


Bedside Glucose  116 mg/dl  














 Date/Time


Source Procedure


Growth Status


 


 


 11/4/17 16:40


Nasal MRSA DNA Surveillance Screen - Final


Specimen Negative for MRSA by DNA Probe Complete











Assessment & Plan


85 yo M with septic shock likely 2/2 pyelo. 





1.  Septic Shock-resuscitated and off Levophed.  Urinary source, in setting of 

UTI with CVA tenderness fevers and white count, considering pyelonephritis.  

Lungs are clear of infection and no reported coughing recently. Leg is in cast 

but patient denies any pain.  Bolused 2L IVF in the ER and required pressors.  

Continued with resuscitation overnight in ICU and doing well today.  Cont broad 

spectrum abx with Vanc and Zosyn.  Blood cultures pending. UCx revealing GNB w S

/S pending.  Pt is much improved today, mentating well.  Continuing on IVF for 

now.  Monitor closely for fluid overload after aggressive IVF resuscitation in 

last 24 hours. 


2.  Carotid disease-cont ASA 81


3.  PAF-on coumadin and subtherapeutic.  Cont coumadin and amiodarone.  No AV 

mary blocking agents and rate is controlled. 


4.  Tremors s/p deep brain stimulators-these are currently turned off.  Some 

tremor noted on exam. 


5.  NEGAR in setting CKD Stage III-baseline creat 1.3-1.6.  Measured at 1.9 this 

am, likely related to ATN from hypoperfusion in shock.  Cont to monitor.  

Renally dose meds as needed. 


6.  Hypothyroidism-cont Synthroid at home dose


7.  Leukocytosis-2/2 bacterial infection and inflammatory response associated 

with it. 





DVT proph-coumadin and heparin while subtherapeutic


Full Code per discussion with patient, wife and daughter on admission


Dispo-cont ICU





DO Silvano Segovia Hospitalist


Consultants:


ICU


Current Inpatient Medications:





Current Inpatient Medications








 Medications


  (Trade)  Dose


 Ordered  Sig/Deanne


 Route  Start Time


 Stop Time Status Last Admin


Dose Admin


 


 Acetaminophen


  (Tylenol Tab)  650 mg  Q4H  PRN


 PO  11/4/17 14:30


 12/4/17 14:29   


 


 


 Norepinephrine


 Bitartrate 8 mg/


 Dextrose  508 ml @ 0


 mls/hr  Q0M PRN


 IV  11/4/17 14:21


 12/4/17 14:20  11/5/17 03:10


0.2 MLS/HR


 


 Pantoprazole


 Sodium


  (Protonix Tab)  40 mg  DAILY


 PO  11/5/17 09:00


 12/5/17 08:59  11/5/17 08:41


40 MG


 


 Morphine Sulfate


  (MoRPHine


 SULFATE INJ)  2 mg  Q2H  PRN


 IV  11/4/17 14:30


 11/18/17 14:29  11/4/17 22:15


2 MG


 


 Amiodarone HCl


  (Cordarone Tab)  200 mg  DAILY


 PO  11/5/17 09:00


 12/5/17 08:59  11/5/17 08:41


200 MG


 


 Aspirin


  (Ecotrin Tab)  81 mg  DAILY


 PO  11/5/17 09:00


 12/5/17 08:59  11/5/17 08:41


81 MG


 


 Atorvastatin


 Calcium


  (Lipitor Tab)  10 mg  DAILY


 PO  11/5/17 09:00


 12/5/17 08:59  11/5/17 08:41


10 MG


 


 Docusate Sodium


  (coLACE CAP)  100 mg  BID


 PO  11/4/17 21:00


 12/4/17 20:59   


 


 


 Acetaminophen/


 Hydrocodone Bitart


  (Norco 5/325 Tab)  1 tab  Q6H  PRN


 PO  11/4/17 14:45


 11/18/17 14:44   


 


 


 Levothyroxine


 Sodium


  (Synthroid Tab)  100 mcg  DAILYBB


 PO  11/5/17 06:00


 12/5/17 06:59  11/5/17 06:11


100 MCG


 


 Polyethylene


  (Miralax Powder


 Packet)  17 gm  DAILY  PRN


 PO  11/4/17 14:45


 12/4/17 14:44   


 


 


 Tamsulosin HCl


  (Flomax Cap)  0.4 mg  HS


 PO  11/4/17 21:00


 12/4/17 20:59  11/4/17 22:17


0.4 MG


 


 Warfarin Sodium


  (Coumadin Tab)  1 mg  MoWeFr@1600


 PO  11/6/17 16:00


 12/6/17 15:59   


 


 


 Warfarin Sodium


  (Coumadin Tab)  0.5 mg  SuTuThSa@1600


 PO  11/4/17 16:00


 12/4/17 15:59  11/4/17 17:20


0.5 MG


 


 Vancomycin HCl


  (Consult)  1 ea  UD  PRN


 N/A  11/4/17 16:15


 11/14/17 16:14   


 


 


 Piperacillin Sod/


 Tazobactam Sod


  (Consult)  1 ea  UD  PRN


 N/A  11/4/17 16:15


 11/14/17 16:14   


 


 


 Piperacillin Sod/


 Tazobactam Sod


 4.5 gm/Dextrose  120 ml @ 


 30 mls/hr  Q8H


 IV  11/4/17 20:00


 11/6/17 11:59  11/5/17 12:41


30 MLS/HR


 


 Vancomycin HCl


 1500 mg/Sodium


 Chloride  530 ml @ 


 200 mls/hr  DAILY@0800


 IV  11/5/17 08:00


 11/6/17 12:59  11/5/17 08:25


200 MLS/HR


 


 Heparin Sodium


  (Porcine)


  (Heparin Sq 5000


 Unit/0.5ml)  5,000 unit  Q12


 SQ  11/4/17 21:00


 12/4/17 20:59  11/5/17 08:44


5,000 UNIT


 


 Miscellaneous


 Information


  (Pending Order)  1 ea  DAILY@10


 N/A  11/5/17 10:00


 12/5/17 09:59   


 


 


 Parenteral


 Electrolyte


 Solution  1,000 ml @ 


 125 mls/hr  Q8H


 IV  11/4/17 19:00


 12/4/17 18:59  11/5/17 11:40


125 MLS/HR


 


 Fentanyl Citrate


  (Fentanyl Inj)  25 mcg  Q1HWA  PRN


 IV  11/4/17 21:00


 11/18/17 20:59   


 


 


 Hydrocortisone


 Sodium Succinate


 50 mg/Syringe  1 ml @ 4


 mls/min  Q8H


 IV  11/5/17 18:00


 12/5/17 17:59

## 2017-11-05 NOTE — CRITICAL CARE PROGRESS NOTE
Critical Care Progress Note


Date of Service


Nov 5, 2017.





Attending


Dr. Sree García


I personally examined this patient, reviewed his clinical and laboratory data, 

interpret his chest x-ray and formulated further plan of care.


In summary, the patient is a 86-year-old  gentleman with history of 

hypertension, hypothyroidism and paroxysmal atrial fibrillation on Coumadin, 

good biventricular systolic function who has been at the rehabilitation 

facility for recent right ankle fracture status requiring ORIF.  Patient was 

brought to [facility] emergency room complaining of 2 days of 

chills and fatigue on 11/04/2017.  Patient was found to be hypotensive, was 

given 2 L of normal saline, started on norepinephrine drip.  Vancomycin and 

Zosyn were initiated.  Patient was transferred critically ill to surgical 

intensive care unit for further management.


Overnight, a CT scan was obtained because of acute onset of back pain.  It did 

not reveal any acute intra-abdominal abnormality, only right lower lobe 

airspace opacity atelectasis versus infectious.  Patient was resuscitated with 

totally 7 L of IV fluid.  Norepinephrine was titrated to off at 9:40 AM.  

Otherwise, patient denies having any chest pain, shortness of breath, abdominal 

pain, fever or chills.





Objective


General: Elderly frail  gentleman who is laying in bed, no significant 

distress.


 Head exam is unremarkable. No scleral icterus.  Pupils were equal and round 

and reactive to light.


Neck is without jugular venous distension, thyromegally, or carotid bruits. 


Carotid upstrokes are brisk bilaterally. no bruits


Lungs are clear to auscultation and percussion. 


Cardiac exam : Regular rhythm, normal S1-S2, no S3 or murmurs.


Abdominal exam reveals normal bowl sounds, no masses, no organomegaly and no 

aortic enlargement. 


Extremities are nonedematous and both femoral and pedal pulses are normal.  

Right ankle cast in situ.  Good capillary refill in the right toe, moves toes 

freely.


Neurologic: grossly normal, no focal deficits.  Difficult of hearing.  Alert 

and oriented 3.


Psychiatric: Appropriate affect.





Assessment & Plan


1.  Sepsis likely from the urinary source.  Urine was growing gram-negative 

bacilli.  We will continue with Zosyn while awaiting for cultures, discontinue 

vancomycin after today.


2.  Cardiovascular: 


   -Hypotension secondary to intravascular volume depletion and vasodilation 

has resolved.  Norepinephrine was discontinued at 9:40 AM.  We'll continue with 

gentle hydration.


   -Paroxysmal atrial fibrillation on Coumadin at home.  Patient is in sinus 

rhythm currently.  Subtherapeutic INR at 1.3.  We'll continue with daily 

Coumadin, monitor INR.  We will continue with amiodarone maintenance.


   -Good biventricular systolic function.


3.  Respiratory: Improved oxygenation, saturation in mid 90s off oxygen.


4.  Acute on chronic kidney injury creatinine (creatinine 1.25 on 11/1/17) .  

We will continue with fluid resuscitation, replace potassium.  CT scan did not 

reveal hydronephrosis.


5.  Anemia (hemoglobin 13.4 on 9/30/17).  No acute bleeding.


6.  GI: Nothing by mouth for now.  We will advance clear liquid diet to regular 

as tolerated.


7.  Hypothyroidism for what will continue with Synthroid.


8.  Central tremors.  Deep brain stimulators are turned off.


9.  Status post bilateral CEA on aspirin 81 mg daily.


10.  Hypercholesterolemia on Lipitor.


11.  Heparin for DVT prophylaxis while INR is still subtherapeutic.





Disposition.  We'll monitor and treat patient in the ICU setting for 1 more 

night as his hemodynamics only recently improved with significant volume 

resuscitation, watch for any signs of fluid overload.  Potential transfer to 

the regular nursing floor tomorrow.


I spent totally 32 minutes of critical care time evaluating and managing this 

patient.





Mary's [place] II Score


Date Score Was Generated:  Nov 5, 2017





Consults & Procedures


Consultants:


Critical care medicine.


Procedures:


None.





Data


Medications:





Current Inpatient Medications








 Medications


  (Trade)  Dose


 Ordered  Sig/Deanne


 Route  Start Time


 Stop Time Status Last Admin


Dose Admin


 


 Acetaminophen


  (Tylenol Tab)  650 mg  Q4H  PRN


 PO  11/4/17 14:30


 12/4/17 14:29   


 


 


 Norepinephrine


 Bitartrate 8 mg/


 Dextrose  508 ml @ 0


 mls/hr  Q0M PRN


 IV  11/4/17 14:21


 12/4/17 14:20  11/5/17 03:10


0.2 MLS/HR


 


 Pantoprazole


 Sodium


  (Protonix Tab)  40 mg  DAILY


 PO  11/5/17 09:00


 12/5/17 08:59  11/5/17 08:41


40 MG


 


 Morphine Sulfate


  (MoRPHine


 SULFATE INJ)  2 mg  Q2H  PRN


 IV  11/4/17 14:30


 11/18/17 14:29  11/4/17 22:15


2 MG


 


 Amiodarone HCl


  (Cordarone Tab)  200 mg  DAILY


 PO  11/5/17 09:00


 12/5/17 08:59  11/5/17 08:41


200 MG


 


 Aspirin


  (Ecotrin Tab)  81 mg  DAILY


 PO  11/5/17 09:00


 12/5/17 08:59  11/5/17 08:41


81 MG


 


 Atorvastatin


 Calcium


  (Lipitor Tab)  10 mg  DAILY


 PO  11/5/17 09:00


 12/5/17 08:59  11/5/17 08:41


10 MG


 


 Docusate Sodium


  (coLACE CAP)  100 mg  BID


 PO  11/4/17 21:00


 12/4/17 20:59   


 


 


 Acetaminophen/


 Hydrocodone Bitart


  (Norco 5/325 Tab)  1 tab  Q6H  PRN


 PO  11/4/17 14:45


 11/18/17 14:44   


 


 


 Levothyroxine


 Sodium


  (Synthroid Tab)  100 mcg  DAILYBB


 PO  11/5/17 06:00


 12/5/17 06:59  11/5/17 06:11


100 MCG


 


 Polyethylene


  (Miralax Powder


 Packet)  17 gm  DAILY  PRN


 PO  11/4/17 14:45


 12/4/17 14:44   


 


 


 Tamsulosin HCl


  (Flomax Cap)  0.4 mg  HS


 PO  11/4/17 21:00


 12/4/17 20:59  11/4/17 22:17


0.4 MG


 


 Warfarin Sodium


  (Coumadin Tab)  1 mg  MoWeFr@1600


 PO  11/6/17 16:00


 12/6/17 15:59   


 


 


 Warfarin Sodium


  (Coumadin Tab)  0.5 mg  SuTuThSa@1600


 PO  11/4/17 16:00


 12/4/17 15:59  11/4/17 17:20


0.5 MG


 


 Vancomycin HCl


  (Consult)  1 ea  UD  PRN


 N/A  11/4/17 16:15


 11/14/17 16:14   


 


 


 Piperacillin Sod/


 Tazobactam Sod


  (Consult)  1 ea  UD  PRN


 N/A  11/4/17 16:15


 11/14/17 16:14   


 


 


 Piperacillin Sod/


 Tazobactam Sod


 4.5 gm/Dextrose  120 ml @ 


 30 mls/hr  Q8H


 IV  11/4/17 20:00


 11/6/17 11:59  11/5/17 04:00


30 MLS/HR


 


 Vancomycin HCl


 1500 mg/Sodium


 Chloride  530 ml @ 


 200 mls/hr  DAILY@0800


 IV  11/5/17 08:00


 11/6/17 12:59  11/5/17 08:25


200 MLS/HR


 


 Heparin Sodium


  (Porcine)


  (Heparin Sq 5000


 Unit/0.5ml)  5,000 unit  Q12


 SQ  11/4/17 21:00


 12/4/17 20:59  11/5/17 08:44


5,000 UNIT


 


 Miscellaneous


 Information


  (Pending Order)  1 ea  DAILY@10


 N/A  11/5/17 10:00


 12/5/17 09:59   


 


 


 Parenteral


 Electrolyte


 Solution  1,000 ml @ 


 125 mls/hr  Q8H


 IV  11/4/17 19:00


 12/4/17 18:59  11/5/17 03:10


125 MLS/HR


 


 Fentanyl Citrate


  (Fentanyl Inj)  25 mcg  Q1HWA  PRN


 IV  11/4/17 21:00


 11/18/17 20:59   


 


 


 Hydrocortisone


 Sodium Succinate


 100 mg/Syringe  2 ml @ 4


 mls/min  Q8H


 IV  11/5/17 02:00


 11/6/17 18:01  11/5/17 02:00


4 MLS/MIN








Vital Signs:











  Date Time  Temp Pulse Resp B/P (MAP) Pulse Ox O2 Delivery O2 Flow Rate FiO2


 


11/5/17 10:00  66 15 120/71 (87) 96 Room Air  


 


11/5/17 09:31  63 15 96/63 (74) 92 Room Air  


 


11/5/17 09:00  63 14 131/73 (92) 95 Room Air  


 


11/5/17 08:32  71 16 119/76 (90) 96 Nasal Cannula 2.0 


 


11/5/17 08:00 36.4 65 17 129/77 (94) 99 Nasal Cannula 2.0 


 


11/5/17 08:00      Nasal Cannula 2.0 


 


11/5/17 07:31  65 15 104/70 (81) 97 Nasal Cannula 2.0 


 


11/5/17 07:01  70 14 101/66 (78) 97 Nasal Cannula 2.0 


 


11/5/17 05:15  67 15 123/74 (90) 100   


 


11/5/17 05:01  63 18 105/63 (77) 91   


 


11/5/17 04:45  69 17 145/77 (99) 100   


 


11/5/17 04:30  70 15 116/69 (85) 99   


 


11/5/17 04:15  71 16 130/78 (95) 99   


 


11/5/17 04:11     98 Nasal Cannula 2.0 


 


11/5/17 04:01  73 19 100/57 (71) 97   


 


11/5/17 03:46  84 17 119/62 (81) 93   


 


11/5/17 03:31  74 16 90/54 (66) 88   


 


11/5/17 03:15  75 23 100/55 (70) 92   


 


11/5/17 03:00  76 17 94/51 (65) 90   


 


11/5/17 02:45  76 15 90/62 (71) 92   


 


11/5/17 02:30  76 17 86/54 (65) 93   


 


11/5/17 02:16  78 17 90/55 (67) 95   


 


11/5/17 02:01  81 20 105/50 (68)    


 


11/5/17 01:45  80 18 79/55 (63)    


 


11/5/17 01:30  82 19 89/52 (64)    


 


11/5/17 01:15  83 16 84/55 (65) 98   


 


11/5/17 01:03  83 16 71/49 (56) 97 Nasal Cannula 2.0 


 


11/5/17 01:00  82 17 71/49 (56)  Nasal Cannula 2.0 


 


11/5/17 00:45  82 20 68/49 (55)  Nasal Cannula 2.0 


 


11/5/17 00:30  81 17 71/47 (55)  Nasal Cannula 2.0 


 


11/5/17 00:26     98 Nasal Cannula 2.0 


 


11/5/17 00:19  83 17 87/52 (64) 97 Nasal Cannula 2.0 


 


11/5/17 00:15  81 17 67/45 (52) 95 Nasal Cannula 2.0 


 


11/5/17 00:00 36.3 83 19 74/50 (58) 97 Nasal Cannula 2.0 


 


11/4/17 23:45  79 18 70/41 (51) 95 Nasal Cannula 2.0 


 


11/4/17 23:30  77 18 72/46 (55) 94 Nasal Cannula 2.0 


 


11/4/17 23:15  76 17 73/49 (57) 96 Nasal Cannula 2.0 


 


11/4/17 23:00  74 17 65/45 (52) 94 Nasal Cannula 2.0 


 


11/4/17 22:15  78 18 76/44 (55) 95   


 


11/4/17 22:00 37.1 80 18 74/40 (51) 95 Nasal Cannula 2.0 


 


11/4/17 21:45  83 16 84/43 (57) 95 Nasal Cannula 2.0 


 


11/4/17 21:30  91 18 106/46 (66) 97 Nasal Cannula 2.0 


 


11/4/17 21:22  100 17 105/64 (78) 93 Nasal Cannula 2.0 


 


11/4/17 20:45    118/66 (83)  Nasal Cannula 2.0 


 


11/4/17 20:39  104 24 138/68 (91)  Nasal Cannula 2.0 


 


11/4/17 20:27     98 Nasal Cannula 2.0 


 


11/4/17 20:22  109 26 110/49 (69)  Nasal Cannula 2.0 


 


11/4/17 20:00 37.1 67 17 96/54 (68) 100 Nasal Cannula 2.0 


 


11/4/17 19:45  176 17 91/52 (65) 74 Nasal Cannula 2.0 


 


11/4/17 19:30  64 17 94/50 (65) 100 Nasal Cannula 2.0 


 


11/4/17 19:15  66 15 91/47 (62) 99 Nasal Cannula 2.0 


 


11/4/17 19:00  70 17 99/51 (67) 97 Nasal Cannula 2.0 


 


11/4/17 18:16  71 18 109/46 (67) 98 Nasal Cannula 2.0 


 


11/4/17 18:00  66 17 90/47 (61) 97 Nasal Cannula 2.0 


 


11/4/17 17:45  68 19 83/43 (56) 97 Nasal Cannula 2.0 


 


11/4/17 17:30  68 17 78/42 (54) 95 Nasal Cannula 2.0 


 


11/4/17 17:15  68 19 85/47 (60) 97 Nasal Cannula 2.0 


 


11/4/17 17:00  71 20 92/43 (59) 96 Nasal Cannula 2.0 


 


11/4/17 16:45  70 19 73/42 (52) 94 Nasal Cannula 2.0 


 


11/4/17 16:30     94 Nasal Cannula 2.0 


 


11/4/17 16:30  74 19 82/40 (54) 90 Nasal Cannula 2.0 


 


11/4/17 16:15  72 18 79/37 (51) 93 Nasal Cannula 2.0 


 


11/4/17 16:12 36.7 74 18 72/40 (51) 93 Nasal Cannula 2.0 


 


11/4/17 15:45  76 18 90/57 98 Nasal Cannula 2.0 


 


11/4/17 15:30  76 15 87/62 95   


 


11/4/17 15:15  79 19 94/56 91 Nasal Cannula 2.0 


 


11/4/17 15:00  68 19 98/55 94   


 


11/4/17 13:38    84/50  Nasal Cannula 2.0 


 


11/4/17 12:42  82 18  84   


 


11/4/17 12:27  85 24  100   


 


11/4/17 12:15    72/45    


 


11/4/17 12:12  84      


 


11/4/17 12:12  84 22  98   


 


11/4/17 12:00    78/49    


 


11/4/17 11:57  215 18 87/52    


 


11/4/17 11:46     94 Nasal Cannula 2.0 


 


11/4/17 11:46     90 Room Air  


 


11/4/17 11:42  88 17  95   


 


11/4/17 11:41 36.7 87 18 77/47 96 Nasal Cannula  


 


11/4/17 11:40    77/47    


 


11/4/17 11:36    81/55    








Laboratory Results:





Last 24 Hours








Test


  11/4/17


11:57 11/4/17


12:50 11/4/17


13:00 11/4/17


13:03


 


Urine Color DK YELLOW    


 


Urine Appearance TURBID    


 


Urine pH 5.0    


 


Urine Specific Gravity 1.016    


 


Urine Protein 2+    


 


Urine Glucose (UA) NEG    


 


Urine Ketones NEG    


 


Urine Occult Blood 3+    


 


Urine Nitrite NEG    


 


Urine Bilirubin NEG    


 


Urine Urobilinogen NEG    


 


Urine Leukocyte Esterase LARGE    


 


Urine WBC (Auto) >30 /hpf    


 


Urine RBC (Auto) 10-30 /hpf    


 


Urine Hyaline Casts (Auto) 1-5 /lpf    


 


Urine Epithelial Cells (Auto) 0-5 /lpf    


 


Urine Bacteria (Auto) 4+    


 


White Blood Count  13.25 K/uL   


 


Red Blood Count  3.36 M/uL   


 


Hemoglobin  9.5 g/dL   


 


Hematocrit  30.7 %   


 


Mean Corpuscular Volume  91.4 fL   


 


Mean Corpuscular Hemoglobin  28.3 pg   


 


Mean Corpuscular Hemoglobin


Concent 


  30.9 g/dl 


  


  


 


 


Platelet Count  190 K/uL   


 


Mean Platelet Volume  10.6 fL   


 


Neutrophils (%) (Auto)  96.7 %   


 


Lymphocytes (%) (Auto)  1.5 %   


 


Monocytes (%) (Auto)  1.5 %   


 


Eosinophils (%) (Auto)  0.0 %   


 


Basophils (%) (Auto)  0.0 %   


 


Neutrophils # (Auto)  12.81 K/uL   


 


Lymphocytes # (Auto)  0.20 K/uL   


 


Monocytes # (Auto)  0.20 K/uL   


 


Eosinophils # (Auto)  0.00 K/uL   


 


Basophils # (Auto)  0.00 K/uL   


 


RDW Standard Deviation  54.2 fL   


 


RDW Coefficient of Variation  16.3 %   


 


Immature Granulocyte % (Auto)  0.3 %   


 


Immature Granulocyte # (Auto)  0.04 K/uL   


 


Toxic Vacuolation  2+   


 


Dohle Bodies  1+   


 


Polychromasia  1+   


 


Prothrombin Time  14.0 SECONDS   


 


Prothromb Time International


Ratio 


  1.3 


  


  


 


 


Sodium Level  138 mmol/L   


 


Potassium Level  3.7 mmol/L   


 


Chloride Level  105 mmol/L   


 


Carbon Dioxide Level  25 mmol/L   


 


Anion Gap  8.0 mmol/L   16.0 mmol/L 


 


Blood Urea Nitrogen  28 mg/dl   


 


Creatinine  1.87 mg/dl   


 


Est Creatinine Clear Calc


Drug Dose 


  33.3 ml/min 


  


  


 


 


Estimated GFR (


American) 


  36.9 


  


  


 


 


Estimated GFR (Non-


American 


  31.8 


  


  


 


 


BUN/Creatinine Ratio  14.9   


 


Random Glucose  87 mg/dl   


 


Calcium Level  7.9 mg/dl   


 


Magnesium Level  1.6 mg/dl   


 


Total Bilirubin  1.1 mg/dl   


 


Direct Bilirubin  0.7 mg/dl   


 


Aspartate Amino Transf


(AST/SGOT) 


  103 U/L 


  


  


 


 


Alanine Aminotransferase


(ALT/SGPT) 


  64 U/L 


  


  


 


 


Alkaline Phosphatase  181 U/L   


 


Total Creatine Kinase  44 U/L   


 


Creatine Kinase MB  0.9 ng/ml   


 


Creatine Kinase MB Ratio  2.0   


 


Troponin I  0.402 ng/ml   


 


Total Protein  5.4 gm/dl   


 


Albumin  1.9 gm/dl   


 


Bedside Lactic Acid Venous   2.99 mmol/L  


 


Bedside Hemoglobin    9.9 g/dl 


 


Bedside Hematocrit    29 % 


 


Bedside Sodium    138 mEq/L 


 


Bedside Potassium    3.7 mEq/L 


 


Bedside Chloride    101 mEq/L 


 


Bedside Total CO2    25 mEq/l 


 


Bedside Blood Urea Nitrogen    24 mg/dl 


 


Bedside Creatinine    1.8 mg/dl 


 


Bedside Glucose (other)    86 mg/dl 


 


Bedside Ionized Calcium (Kee)    1.10 mmol/l 


 


Test


  11/5/17


06:42 


  


  


 


 


White Blood Count 26.95 K/uL    


 


Red Blood Count 3.47 M/uL    


 


Hemoglobin 9.7 g/dL    


 


Hematocrit 31.4 %    


 


Mean Corpuscular Volume 90.5 fL    


 


Mean Corpuscular Hemoglobin 28.0 pg    


 


Mean Corpuscular Hemoglobin


Concent 30.9 g/dl 


  


  


  


 


 


Platelet Count 186 K/uL    


 


Mean Platelet Volume 10.9 fL    


 


Neutrophils (%) (Auto) 94.2 %    


 


Lymphocytes (%) (Auto) 1.7 %    


 


Monocytes (%) (Auto) 2.1 %    


 


Eosinophils (%) (Auto) 0.0 %    


 


Basophils (%) (Auto) 0.0 %    


 


Neutrophils # (Auto) 25.39 K/uL    


 


Lymphocytes # (Auto) 0.45 K/uL    


 


Monocytes # (Auto) 0.57 K/uL    


 


Eosinophils # (Auto) 0.00 K/uL    


 


Basophils # (Auto) 0.01 K/uL    


 


RDW Standard Deviation 55.2 fL    


 


RDW Coefficient of Variation 16.8 %    


 


Immature Granulocyte % (Auto) 2.0 %    


 


Immature Granulocyte # (Auto) 0.53 K/uL    


 


Toxic Vacuolation 2+    


 


Dohle Bodies 1+    


 


Platelet Estimate NORMAL    


 


Echinocytes 1+    


 


Prothrombin Time 14.6 SECONDS    


 


Prothromb Time International


Ratio 1.3 


  


  


  


 


 


Sodium Level 137 mmol/L    


 


Potassium Level 4.6 mmol/L    


 


Chloride Level 103 mmol/L    


 


Carbon Dioxide Level 23 mmol/L    


 


Anion Gap 11.0 mmol/L    


 


Blood Urea Nitrogen 27 mg/dl    


 


Creatinine 1.93 mg/dl    


 


Est Creatinine Clear Calc


Drug Dose 32.9 ml/min 


  


  


  


 


 


Estimated GFR (


American) 35.5 


  


  


  


 


 


Estimated GFR (Non-


American 30.6 


  


  


  


 


 


BUN/Creatinine Ratio 14.1    


 


Random Glucose 151 mg/dl    


 


Lactic Acid Level 2.6 mmol/L    


 


Calcium Level 6.9 mg/dl    


 


Total Bilirubin 0.9 mg/dl    


 


Direct Bilirubin 0.6 mg/dl    


 


Aspartate Amino Transf


(AST/SGOT) 201 U/L 


  


  


  


 


 


Alanine Aminotransferase


(ALT/SGPT) 148 U/L 


  


  


  


 


 


Alkaline Phosphatase 159 U/L    


 


Troponin I 0.224 ng/ml    


 


Total Protein 5.6 gm/dl    


 


Albumin 2.1 gm/dl    


 


Procalcitonin > 200.00 ng/ml

## 2017-11-06 VITALS
OXYGEN SATURATION: 95 % | DIASTOLIC BLOOD PRESSURE: 83 MMHG | TEMPERATURE: 97.7 F | SYSTOLIC BLOOD PRESSURE: 150 MMHG | HEART RATE: 61 BPM

## 2017-11-06 VITALS — HEART RATE: 55 BPM | OXYGEN SATURATION: 94 % | SYSTOLIC BLOOD PRESSURE: 130 MMHG | DIASTOLIC BLOOD PRESSURE: 84 MMHG

## 2017-11-06 VITALS — DIASTOLIC BLOOD PRESSURE: 68 MMHG | SYSTOLIC BLOOD PRESSURE: 134 MMHG | HEART RATE: 56 BPM

## 2017-11-06 VITALS
DIASTOLIC BLOOD PRESSURE: 76 MMHG | HEART RATE: 56 BPM | OXYGEN SATURATION: 95 % | SYSTOLIC BLOOD PRESSURE: 144 MMHG | TEMPERATURE: 98.42 F

## 2017-11-06 VITALS — HEART RATE: 54 BPM | OXYGEN SATURATION: 98 % | SYSTOLIC BLOOD PRESSURE: 134 MMHG | DIASTOLIC BLOOD PRESSURE: 77 MMHG

## 2017-11-06 VITALS
DIASTOLIC BLOOD PRESSURE: 72 MMHG | HEART RATE: 62 BPM | TEMPERATURE: 97.34 F | OXYGEN SATURATION: 94 % | SYSTOLIC BLOOD PRESSURE: 137 MMHG

## 2017-11-06 VITALS — HEART RATE: 86 BPM | SYSTOLIC BLOOD PRESSURE: 128 MMHG | DIASTOLIC BLOOD PRESSURE: 70 MMHG | OXYGEN SATURATION: 98 %

## 2017-11-06 VITALS
DIASTOLIC BLOOD PRESSURE: 81 MMHG | SYSTOLIC BLOOD PRESSURE: 138 MMHG | TEMPERATURE: 98.06 F | OXYGEN SATURATION: 99 % | HEART RATE: 56 BPM

## 2017-11-06 VITALS — OXYGEN SATURATION: 97 % | HEART RATE: 58 BPM | DIASTOLIC BLOOD PRESSURE: 78 MMHG | SYSTOLIC BLOOD PRESSURE: 143 MMHG

## 2017-11-06 VITALS
HEART RATE: 56 BPM | DIASTOLIC BLOOD PRESSURE: 76 MMHG | TEMPERATURE: 98.42 F | OXYGEN SATURATION: 95 % | SYSTOLIC BLOOD PRESSURE: 144 MMHG

## 2017-11-06 VITALS
OXYGEN SATURATION: 90 % | DIASTOLIC BLOOD PRESSURE: 68 MMHG | TEMPERATURE: 97.52 F | SYSTOLIC BLOOD PRESSURE: 116 MMHG | HEART RATE: 93 BPM

## 2017-11-06 VITALS — OXYGEN SATURATION: 95 % | SYSTOLIC BLOOD PRESSURE: 137 MMHG | HEART RATE: 57 BPM | DIASTOLIC BLOOD PRESSURE: 77 MMHG

## 2017-11-06 VITALS — OXYGEN SATURATION: 98 %

## 2017-11-06 VITALS — OXYGEN SATURATION: 98 % | HEART RATE: 61 BPM | SYSTOLIC BLOOD PRESSURE: 133 MMHG | DIASTOLIC BLOOD PRESSURE: 78 MMHG

## 2017-11-06 VITALS — HEART RATE: 54 BPM | DIASTOLIC BLOOD PRESSURE: 74 MMHG | SYSTOLIC BLOOD PRESSURE: 139 MMHG | OXYGEN SATURATION: 97 %

## 2017-11-06 VITALS — TEMPERATURE: 97.52 F

## 2017-11-06 VITALS — DIASTOLIC BLOOD PRESSURE: 77 MMHG | HEART RATE: 58 BPM | SYSTOLIC BLOOD PRESSURE: 146 MMHG | OXYGEN SATURATION: 98 %

## 2017-11-06 LAB
ALP SERPL-CCNC: 126 U/L (ref 45–117)
ALT SERPL-CCNC: 110 U/L (ref 12–78)
ANION GAP SERPL CALC-SCNC: 8 MMOL/L (ref 3–11)
AST SERPL-CCNC: 115 U/L (ref 15–37)
BASOPHILS # BLD: 0.01 K/UL (ref 0–0.2)
BASOPHILS NFR BLD: 0.1 %
BUN SERPL-MCNC: 24 MG/DL (ref 7–18)
BUN/CREAT SERPL: 17.9 (ref 10–20)
BURR CELLS BLD QL SMEAR: (no result)
CALCIUM SERPL-MCNC: 6.9 MG/DL (ref 8.5–10.1)
CHLORIDE SERPL-SCNC: 104 MMOL/L (ref 98–107)
CO2 SERPL-SCNC: 26 MMOL/L (ref 21–32)
COMPLETE: YES
CREAT CL PREDICTED SERPL C-G-VRATE: 46.7 ML/MIN
CREAT SERPL-MCNC: 1.36 MG/DL (ref 0.6–1.4)
DOHLE BOD BLD QL SMEAR: (no result)
EOSINOPHIL NFR BLD AUTO: 145 K/UL (ref 130–400)
GLUCOSE SERPL-MCNC: 103 MG/DL (ref 70–99)
HCT VFR BLD CALC: 28.7 % (ref 42–52)
IG%: 0.8 %
IMM GRANULOCYTES NFR BLD AUTO: 3.1 %
INR PPP: 1.3 (ref 0.9–1.1)
LYMPHOCYTES # BLD: 0.61 K/UL (ref 1.2–3.4)
MAGNESIUM SERPL-MCNC: 2.3 MG/DL (ref 1.8–2.4)
MCH RBC QN AUTO: 28 PG (ref 25–34)
MCHC RBC AUTO-ENTMCNC: 31.4 G/DL (ref 32–36)
MCV RBC AUTO: 89.4 FL (ref 80–100)
MONOCYTES NFR BLD: 3.9 %
NEUTROPHILS # BLD AUTO: 0.1 %
NEUTROPHILS NFR BLD AUTO: 92 %
NRBC BLD AUTO-RTO: 0.2 %
PHOSPHATE SERPL-MCNC: 2.2 MG/DL (ref 2.5–4.9)
PLATELET # BLD EST: NORMAL 10*3/UL
PMV BLD AUTO: 10.8 FL (ref 7.4–10.4)
POTASSIUM SERPL-SCNC: 3.5 MMOL/L (ref 3.5–5.1)
PROTHROMBIN TIME: 13.5 SECONDS (ref 9–12)
RBC # BLD AUTO: 3.21 M/UL (ref 4.7–6.1)
SODIUM SERPL-SCNC: 138 MMOL/L (ref 136–145)
WBC # BLD AUTO: 19.65 K/UL (ref 4.8–10.8)

## 2017-11-06 RX ADMIN — PIPERACILLIN SODIUM, TAZOBACTAM SODIUM SCH MLS/HR: 4; .5 INJECTION, POWDER, LYOPHILIZED, FOR SOLUTION INTRAVENOUS at 04:18

## 2017-11-06 RX ADMIN — VANCOMYCIN HYDROCHLORIDE SCH MG: 1 INJECTION, POWDER, LYOPHILIZED, FOR SOLUTION INTRAVENOUS at 15:56

## 2017-11-06 RX ADMIN — VANCOMYCIN HYDROCHLORIDE SCH MG: 1 INJECTION, POWDER, LYOPHILIZED, FOR SOLUTION INTRAVENOUS at 13:35

## 2017-11-06 RX ADMIN — HEPARIN SODIUM SCH UNIT: 10000 INJECTION, SOLUTION INTRAVENOUS; SUBCUTANEOUS at 09:32

## 2017-11-06 RX ADMIN — Medication SCH MG: at 09:29

## 2017-11-06 RX ADMIN — PANTOPRAZOLE SCH MG: 40 TABLET, DELAYED RELEASE ORAL at 09:30

## 2017-11-06 RX ADMIN — DOCUSATE SODIUM SCH MG: 100 CAPSULE, LIQUID FILLED ORAL at 19:33

## 2017-11-06 RX ADMIN — SODIUM CHLORIDE, SODIUM ACETATE ANHYDROUS, SODIUM GLUCONATE, POTASSIUM CHLORIDE, AND MAGNESIUM CHLORIDE SCH MLS/HR: 526; 222; 502; 37; 30 INJECTION, SOLUTION INTRAVENOUS at 11:13

## 2017-11-06 RX ADMIN — Medication SCH ML: at 13:35

## 2017-11-06 RX ADMIN — SODIUM CHLORIDE, SODIUM ACETATE ANHYDROUS, SODIUM GLUCONATE, POTASSIUM CHLORIDE, AND MAGNESIUM CHLORIDE SCH MLS/HR: 526; 222; 502; 37; 30 INJECTION, SOLUTION INTRAVENOUS at 02:33

## 2017-11-06 RX ADMIN — ATORVASTATIN CALCIUM SCH MG: 10 TABLET, FILM COATED ORAL at 09:29

## 2017-11-06 RX ADMIN — DOCUSATE SODIUM SCH MG: 100 CAPSULE, LIQUID FILLED ORAL at 07:58

## 2017-11-06 RX ADMIN — TAMSULOSIN HYDROCHLORIDE SCH MG: 0.4 CAPSULE ORAL at 19:43

## 2017-11-06 RX ADMIN — SODIUM CHLORIDE, SODIUM ACETATE ANHYDROUS, SODIUM GLUCONATE, POTASSIUM CHLORIDE, AND MAGNESIUM CHLORIDE SCH MLS/HR: 526; 222; 502; 37; 30 INJECTION, SOLUTION INTRAVENOUS at 19:43

## 2017-11-06 RX ADMIN — Medication SCH ML: at 19:43

## 2017-11-06 RX ADMIN — HEPARIN SODIUM SCH UNIT: 10000 INJECTION, SOLUTION INTRAVENOUS; SUBCUTANEOUS at 19:46

## 2017-11-06 RX ADMIN — VANCOMYCIN HYDROCHLORIDE SCH MG: 1 INJECTION, POWDER, LYOPHILIZED, FOR SOLUTION INTRAVENOUS at 19:43

## 2017-11-06 RX ADMIN — AMIODARONE HYDROCHLORIDE SCH MG: 200 TABLET ORAL at 09:29

## 2017-11-06 RX ADMIN — HYDROCORTISONE SODIUM SUCCINATE SCH MLS/MIN: 100 INJECTION, POWDER, FOR SOLUTION INTRAMUSCULAR; INTRAVENOUS at 02:33

## 2017-11-06 RX ADMIN — Medication SCH ML: at 15:56

## 2017-11-06 RX ADMIN — LEVOTHYROXINE SODIUM SCH MCG: 100 TABLET ORAL at 06:00

## 2017-11-06 RX ADMIN — HYDROCORTISONE SODIUM SUCCINATE SCH MLS/MIN: 100 INJECTION, POWDER, FOR SOLUTION INTRAMUSCULAR; INTRAVENOUS at 09:32

## 2017-11-06 NOTE — PROGRESS NOTE
Medicine Progress Note


Date & Time of Visit:


Nov 6, 2017 at 12:01.


Subjective


tolerating clear liquids with plan to cont to solid foods at lunch today


no abdominal pain but some diarrhea noted and c-diff if positive.  Started on 

vancomycin


Johnson out 1 hour ago, has not urinated spontaneously yet.


Denies fevers or chills


Wife at bedside and all questions were answered with plan discussed.





Objective





Last 8 Hrs








  Date Time  Temp Pulse Resp B/P (MAP) Pulse Ox O2 Delivery O2 Flow Rate FiO2


 


11/6/17 10:00  57 15 137/77 (97) 95 Room Air  


 


11/6/17 09:00  58 14 143/78 (99) 97 Room Air  


 


11/6/17 08:01 36.4 93 15 116/68 (84) 90 Room Air  


 


11/6/17 08:00      Room Air  


 


11/6/17 07:00  55 13 130/84 (99) 94 Room Air  


 


11/6/17 06:16  61 21 133/78 (96) 98 Room Air  


 


11/6/17 05:00  58 15 146/77 (100) 98 Room Air  


 


11/6/17 04:23     98 Nasal Cannula 2.0 


 


11/6/17 04:04 36.4       








Physical Exam:


GEN: WNWD, in no acute distress, alert and appropriate, lying supine in bed, 

struggles to sit up without assistance. Deconditioned.  


HEENT: NC/AT, normal sclerae, mucous membranes moist


CARDIO: reg rate, S1/2 heard without m/g/r


LUNGS: CTA bilaterally, no crackles, rales or wheezes, good diaphragmatic 

excursion


ABD: soft, non-tender, non-distended, no rebound or guarding, +BS, no CVA 

tenderness appreciated. 


EXTREMITY: RP and DP palpable 2+ bilat, trace swelling bilaterally, hard cast 

to L lower leg, toes warm and well perfused bilaterally


N/M:  alert and appropriate, +bilateral hand tremors noted. Able to moves 

extremities equally.  Deconditioned. 


SKIN:  warm and dry


Laboratory Results:


11/6/17 05:21








Red Blood Count 3.21, Mean Corpuscular Volume 89.4, Mean Corpuscular Hemoglobin 

28.0, Mean Corpuscular Hemoglobin Concent 31.4, Mean Platelet Volume 10.8, 

Neutrophils (%) (Auto) 92.0, Lymphocytes (%) (Auto) 3.1, Monocytes (%) (Auto) 

3.9, Eosinophils (%) (Auto) 0.1, Basophils (%) (Auto) 0.1, Neutrophils # (Auto) 

18.11, Lymphocytes # (Auto) 0.61, Monocytes # (Auto) 0.76, Eosinophils # (Auto) 

0.01, Basophils # (Auto) 0.01





11/6/17 05:21

















Test


  11/4/17


11:57 11/4/17


12:50 11/4/17


13:00 11/4/17


13:03


 


Urine Color DK YELLOW    


 


Urine Appearance TURBID (CLEAR)    


 


Urine pH 5.0 (4.5-7.5)    


 


Urine Specific Gravity


  1.016


(1.000-1.030) 


  


  


 


 


Urine Protein 2+ (NEG)    


 


Urine Glucose (UA) NEG (NEG)    


 


Urine Ketones NEG (NEG)    


 


Urine Occult Blood 3+ (NEG)    


 


Urine Nitrite NEG (NEG)    


 


Urine Bilirubin NEG (NEG)    


 


Urine Urobilinogen NEG (NEG)    


 


Urine Leukocyte Esterase LARGE (NEG)    


 


Urine WBC (Auto) >30 /hpf (0-5)    


 


Urine RBC (Auto)


  10-30 /hpf


(0-4) 


  


  


 


 


Urine Hyaline Casts (Auto) 1-5 /lpf (0-5)    


 


Urine Epithelial Cells (Auto) 0-5 /lpf (0-5)    


 


Urine Bacteria (Auto) 4+ (NEG)    


 


Polychromasia  1+   


 


Total Creatine Kinase


  


  44 U/L


() 


  


 


 


Creatine Kinase MB


  


  0.9 ng/ml


(0.5-3.6) 


  


 


 


Creatine Kinase MB Ratio  2.0 (0-3.0)   


 


Bedside Lactic Acid Venous


  


  


  2.99 mmol/L


(0.90-1.70) 


 


 


Bedside Hemoglobin


  


  


  


  9.9 g/dl


(14.0-18.0)


 


Bedside Hematocrit    29 % (42-52) 


 


Bedside Sodium


  


  


  


  138 mEq/L


(135-144)


 


Bedside Potassium


  


  


  


  3.7 mEq/L


(3.3-5.0)


 


Bedside Chloride


  


  


  


  101 mEq/L


(101-112)


 


Bedside Total CO2


  


  


  


  25 mEq/l


(24-31)


 


Bedside Blood Urea Nitrogen


  


  


  


  24 mg/dl


(7-18)


 


Bedside Creatinine


  


  


  


  1.8 mg/dl


(0.6-1.3)


 


Bedside Glucose (other)


  


  


  


  86 mg/dl


(70-99)


 


Bedside Ionized Calcium (Kee)


  


  


  


  1.10 mmol/l


(1.12-1.32)


 


Test


  11/5/17


06:42 11/5/17


14:08 11/5/17


21:48 11/6/17


05:21


 


Toxic Vacuolation 2+    


 


Thyroid Stimulating Hormone


(TSH) 


  4.570 uIu/ml


(0.300-4.500) 


  


 


 


Troponin I


  


  


  0.077 ng/ml


(0-0.045) 


 


 


White Blood Count


  


  


  


  19.65 K/uL


(4.8-10.8)


 


Red Blood Count


  


  


  


  3.21 M/uL


(4.7-6.1)


 


Hemoglobin


  


  


  


  9.0 g/dL


(14.0-18.0)


 


Hematocrit    28.7 % (42-52) 


 


Mean Corpuscular Volume


  


  


  


  89.4 fL


()


 


Mean Corpuscular Hemoglobin


  


  


  


  28.0 pg


(25-34)


 


Mean Corpuscular Hemoglobin


Concent 


  


  


  31.4 g/dl


(32-36)


 


Platelet Count


  


  


  


  145 K/uL


(130-400)


 


Mean Platelet Volume


  


  


  


  10.8 fL


(7.4-10.4)


 


Neutrophils (%) (Auto)    92.0 % 


 


Lymphocytes (%) (Auto)    3.1 % 


 


Monocytes (%) (Auto)    3.9 % 


 


Eosinophils (%) (Auto)    0.1 % 


 


Basophils (%) (Auto)    0.1 % 


 


Neutrophils # (Auto)


  


  


  


  18.11 K/uL


(1.4-6.5)


 


Lymphocytes # (Auto)


  


  


  


  0.61 K/uL


(1.2-3.4)


 


Monocytes # (Auto)


  


  


  


  0.76 K/uL


(0.11-0.59)


 


Eosinophils # (Auto)


  


  


  


  0.01 K/uL


(0-0.5)


 


Basophils # (Auto)


  


  


  


  0.01 K/uL


(0-0.2)


 


RDW Standard Deviation


  


  


  


  54.2 fL


(36.4-46.3)


 


RDW Coefficient of Variation


  


  


  


  16.6 %


(11.5-14.5)


 


Immature Granulocyte % (Auto)    0.8 % 


 


Immature Granulocyte # (Auto)


  


  


  


  0.15 K/uL


(0.00-0.02)


 


Nucleated RBC Absolute Count


(auto) 


  


  


  0.05 K/uL


(0-0)


 


Nucleated Red Blood Cells %    0.2 % 


 


Dohle Bodies    1+ 


 


Platelet Estimate    NORMAL 


 


Echinocytes    1+ 


 


Prothrombin Time


  


  


  


  13.5 SECONDS


(9.0-12.0)


 


Prothromb Time International


Ratio 


  


  


  1.3 (0.9-1.1) 


 


 


Anion Gap


  


  


  


  8.0 mmol/L


(3-11)


 


Est Creatinine Clear Calc


Drug Dose 


  


  


  46.7 ml/min 


 


 


Estimated GFR (


American) 


  


  


  54.2 


 


 


Estimated GFR (Non-


American 


  


  


  46.8 


 


 


BUN/Creatinine Ratio    17.9 (10-20) 


 


Lactic Acid Level


  


  


  


  0.9 mmol/L


(0.4-2.0)


 


Calcium Level


  


  


  


  6.9 mg/dl


(8.5-10.1)


 


Phosphorus Level


  


  


  


  2.2 mg/dl


(2.5-4.9)


 


Magnesium Level


  


  


  


  2.3 mg/dl


(1.8-2.4)


 


Total Bilirubin


  


  


  


  0.6 mg/dl


(0.2-1)


 


Direct Bilirubin


  


  


  


  0.2 mg/dl


(0-0.2)


 


Aspartate Amino Transf


(AST/SGOT) 


  


  


  115 U/L


(15-37)


 


Alanine Aminotransferase


(ALT/SGPT) 


  


  


  110 U/L


(12-78)


 


Alkaline Phosphatase


  


  


  


  126 U/L


()


 


Total Protein


  


  


  


  5.2 gm/dl


(6.4-8.2)


 


Albumin


  


  


  


  1.8 gm/dl


(3.4-5.0)


 


Procalcitonin


  


  


  


  170.25 ng/ml


(0-0.5)


 


Test


  11/6/17


06:13 


  


  


 


 


Bedside Glucose


  109 mg/dl


(70-99) 


  


  


 














 Date/Time


Source Procedure


Growth Status


 


 


 11/4/17 12:45


Blood Blood Culture - Preliminary


NO GROWTH TO DATE. Resulted


 


 11/4/17 16:40


Nasal MRSA DNA Surveillance Screen - Final


Specimen Negative for MRSA by DNA Probe Complete


 


 11/6/17 06:14


Stool C.difficile Toxin B Gene (PCR) - Final


Positive for C. difficile toxin B gene Complete


 


 11/4/17 11:57


Urine,Catheterized Urine Culture - Final


Escherichia Coli Complete








Last 24 Hours








Test


  11/5/17


14:08 11/5/17


16:11 11/5/17


21:48 11/6/17


05:21


 


Troponin I 0.127 ng/ml   0.077 ng/ml  


 


Thyroid Stimulating Hormone


(TSH) 4.570 uIu/ml 


  


  


  


 


 


Bedside Glucose  114 mg/dl   


 


White Blood Count    19.65 K/uL 


 


Red Blood Count    3.21 M/uL 


 


Hemoglobin    9.0 g/dL 


 


Hematocrit    28.7 % 


 


Mean Corpuscular Volume    89.4 fL 


 


Mean Corpuscular Hemoglobin    28.0 pg 


 


Mean Corpuscular Hemoglobin


Concent 


  


  


  31.4 g/dl 


 


 


Platelet Count    145 K/uL 


 


Mean Platelet Volume    10.8 fL 


 


Neutrophils (%) (Auto)    92.0 % 


 


Lymphocytes (%) (Auto)    3.1 % 


 


Monocytes (%) (Auto)    3.9 % 


 


Eosinophils (%) (Auto)    0.1 % 


 


Basophils (%) (Auto)    0.1 % 


 


Neutrophils # (Auto)    18.11 K/uL 


 


Lymphocytes # (Auto)    0.61 K/uL 


 


Monocytes # (Auto)    0.76 K/uL 


 


Eosinophils # (Auto)    0.01 K/uL 


 


Basophils # (Auto)    0.01 K/uL 


 


RDW Standard Deviation    54.2 fL 


 


RDW Coefficient of Variation    16.6 % 


 


Immature Granulocyte % (Auto)    0.8 % 


 


Immature Granulocyte # (Auto)    0.15 K/uL 


 


Nucleated RBC Absolute Count


(auto) 


  


  


  0.05 K/uL 


 


 


Nucleated Red Blood Cells %    0.2 % 


 


Dohle Bodies    1+ 


 


Platelet Estimate    NORMAL 


 


Echinocytes    1+ 


 


Prothrombin Time    13.5 SECONDS 


 


Prothromb Time International


Ratio 


  


  


  1.3 


 


 


Sodium Level    138 mmol/L 


 


Potassium Level    3.5 mmol/L 


 


Chloride Level    104 mmol/L 


 


Carbon Dioxide Level    26 mmol/L 


 


Anion Gap    8.0 mmol/L 


 


Blood Urea Nitrogen    24 mg/dl 


 


Creatinine    1.36 mg/dl 


 


Est Creatinine Clear Calc


Drug Dose 


  


  


  46.7 ml/min 


 


 


Estimated GFR (


American) 


  


  


  54.2 


 


 


Estimated GFR (Non-


American 


  


  


  46.8 


 


 


BUN/Creatinine Ratio    17.9 


 


Random Glucose    103 mg/dl 


 


Lactic Acid Level    0.9 mmol/L 


 


Calcium Level    6.9 mg/dl 


 


Phosphorus Level    2.2 mg/dl 


 


Magnesium Level    2.3 mg/dl 


 


Total Bilirubin    0.6 mg/dl 


 


Direct Bilirubin    0.2 mg/dl 


 


Aspartate Amino Transf


(AST/SGOT) 


  


  


  115 U/L 


 


 


Alanine Aminotransferase


(ALT/SGPT) 


  


  


  110 U/L 


 


 


Alkaline Phosphatase    126 U/L 


 


Total Protein    5.2 gm/dl 


 


Albumin    1.8 gm/dl 


 


Procalcitonin    170.25 ng/ml 


 


Test


  11/6/17


06:13 


  


  


 


 


Bedside Glucose 109 mg/dl    














 Date/Time


Source Procedure


Growth Status


 


 


 11/6/17 06:14


Stool C.difficile Toxin B Gene (PCR) - Final


Positive for C. difficile toxin B gene Complete











Assessment & Plan


85 yo M with septic shock likely 2/2 pyelo. 





1.  Sepsis 2/2 E coli-resuscitated and off Levophed.  Leg is in cast but 

patient denies any pain.  Vanc and Zosyn switched to Rocephin IV as pt not 

quite tolerating PO with plan to transition to PO medication tomorrow.  Blood 

cultures negative to date. Continues to improve clinically.  Will hold on any 

other IVF at this time as there is some swelling in legs.  


2.  Carotid disease-cont ASA 81


3.  PAF-on coumadin and subtherapeutic.  Cont coumadin and amiodarone.  No AV 

mary blocking agents and rate is controlled. 


4.  Tremors s/p deep brain stimulators-these are currently turned off.  


5.  NEGAR in setting CKD Stage III-resolved.  Baseline creat 1.3-1.6.  


6.  Hypothyroidism-cont Synthroid at home dose


7.  Leukocytosis-2/2 bacterial infection and inflammatory response associated 

with it. Improving.  





DVT proph-coumadin and heparin while subtherapeutic


Full Code per discussion with patient, wife and daughter on admission


Dispo-transfer to telemetry.  Push early ambulation as tolerated.  PT/OT.  





DO Silvano Segovia Hospitalist


Consultants:


ICU


Current Inpatient Medications:





Current Inpatient Medications








 Medications


  (Trade)  Dose


 Ordered  Sig/Deanne


 Route  Start Time


 Stop Time Status Last Admin


Dose Admin


 


 Acetaminophen


  (Tylenol Tab)  650 mg  Q4H  PRN


 PO  11/4/17 14:30


 12/4/17 14:29   


 


 


 Pantoprazole


 Sodium


  (Protonix Tab)  40 mg  DAILY


 PO  11/5/17 09:00


 12/5/17 08:59  11/6/17 09:30


40 MG


 


 Amiodarone HCl


  (Cordarone Tab)  200 mg  DAILY


 PO  11/5/17 09:00


 12/5/17 08:59  11/6/17 09:29


200 MG


 


 Aspirin


  (Ecotrin Tab)  81 mg  DAILY


 PO  11/5/17 09:00


 12/5/17 08:59  11/6/17 09:29


81 MG


 


 Atorvastatin


 Calcium


  (Lipitor Tab)  10 mg  DAILY


 PO  11/5/17 09:00


 12/5/17 08:59  11/6/17 09:29


10 MG


 


 Docusate Sodium


  (coLACE CAP)  100 mg  BID


 PO  11/4/17 21:00


 12/4/17 20:59   


 


 


 Acetaminophen/


 Hydrocodone Bitart


  (Norco 5/325 Tab)  1 tab  Q6H  PRN


 PO  11/4/17 14:45


 11/18/17 14:44   


 


 


 Levothyroxine


 Sodium


  (Synthroid Tab)  100 mcg  DAILYBB


 PO  11/5/17 06:00


 12/5/17 06:59  11/6/17 06:00


100 MCG


 


 Polyethylene


  (Miralax Powder


 Packet)  17 gm  DAILY  PRN


 PO  11/4/17 14:45


 12/4/17 14:44   


 


 


 Tamsulosin HCl


  (Flomax Cap)  0.4 mg  HS


 PO  11/4/17 21:00


 12/4/17 20:59  11/5/17 20:48


0.4 MG


 


 Warfarin Sodium


  (Coumadin Tab)  1 mg  MoWeFr@1600


 PO  11/6/17 16:00


 12/6/17 15:59   


 


 


 Warfarin Sodium


  (Coumadin Tab)  0.5 mg  SuTuThSa@1600


 PO  11/4/17 16:00


 12/4/17 15:59  11/5/17 16:02


0.5 MG


 


 Heparin Sodium


  (Porcine)


  (Heparin Sq 5000


 Unit/0.5ml)  5,000 unit  Q12


 SQ  11/4/17 21:00


 12/4/17 20:59  11/6/17 09:32


5,000 UNIT


 


 Miscellaneous


 Information


  (Pending Order)  1 ea  DAILY@10


 N/A  11/5/17 10:00


 12/5/17 09:59   


 


 


 Parenteral


 Electrolyte


 Solution  1,000 ml @ 


 125 mls/hr  Q8H


 IV  11/4/17 19:00


 12/4/17 18:59  11/6/17 11:13


125 MLS/HR


 


 Fentanyl Citrate


  (Fentanyl Inj)  25 mcg  Q1HWA  PRN


 IV  11/4/17 21:00


 11/18/17 20:59   


 


 


 Hydrocortisone


 Sodium Succinate


 50 mg/Syringe  1 ml @ 4


 mls/min  Q8H


 IV  11/5/17 18:00


 12/5/17 17:59  11/6/17 09:32


4 MLS/MIN


 


 Ceftriaxone


 Sodium 1 gm/


 Dextrose  50 ml @ 


 100 mls/hr  Q24H


 IV  11/6/17 11:00


 11/17/17 11:29  11/6/17 11:11


100 MLS/HR


 


 Vancomycin HCl


  (Vancomycin Oral


 Soln)  125 mg  QID


 PO  11/6/17 13:00


 11/20/17 12:59   


 


 


 Raspberry


  (Raspberry Syrup


 5ml Cup)  5 ml  QID


 PO  11/6/17 13:00


 11/20/17 12:59

## 2017-11-06 NOTE — CRITICAL CARE PROGRESS NOTE
Critical Care Progress Note


Date of Service


Nov 6, 2017.





Attending


Dr. Calvert





Subjective


He is feeling much better.   Some diarrhea noted and c.diff toxin positive.   

Was started on Vanco.   Cultures noted and antibiotics moderated.  No dyspnea. 

  No abdominal pain.   No worsening  symptoms.   Awake and alert and reports 

feeling much better.





Objective


General: he is without complaints.


 Head exam --no new focal changes.   No JVD


Lungs are clear to auscultation--exchange is good


Cardiac exam : Regular rhythm, normal S1-S2, no S3 or murmurs.


Abdominal exam--functional and nontender


Extremities--no worsening edema


Neurologic: grossly normal, 


Psychiatric: Appropriate affect.





Current SOFA Score











 SOFA Score Response (Comments) Value


 


 PaO2/FiO2 (mmHg)  < 400 1


 


 SaO2 / FIO2  221 - 301 1


 


 Platelets (x10)  > 150 0


 


 Bilirubin (mg/dL)  < 1.2 0


 


 [place] Coma Score  13 - 14 1


 


 Level of Hypotension  No Hypotension 0


 


 Creatinine (mg/dL)  1.2  -  1.9 1


 


Total  4











Assessment & Plan


Urinary Track Infection and Sepsis--Recent ankle fracture and antibiotic use--

c.diff toxin positive.


1. ID--PO Vanco and continue urinary coveragea


2. Pulmonary--toilette and track


3. Cardio--hypotension resolved


4. GI--Vanco and monitor clinically


5. DVT--prophylaxis--Heparin--coumadin dosing to modify


6. Renal--tracking performance


ICU--can triage to medical floor.





[place] II Score


Date Score Was Generated:  Nov 5, 2017





Consults & Procedures


Consultants:


Critical care medicine.


Procedures:


None.





Data


Medications:





Current Inpatient Medications








 Medications


  (Trade)  Dose


 Ordered  Sig/Deanne


 Route  Start Time


 Stop Time Status Last Admin


Dose Admin


 


 Acetaminophen


  (Tylenol Tab)  650 mg  Q4H  PRN


 PO  11/4/17 14:30


 12/4/17 14:29   


 


 


 Pantoprazole


 Sodium


  (Protonix Tab)  40 mg  DAILY


 PO  11/5/17 09:00


 12/5/17 08:59  11/6/17 09:30


40 MG


 


 Amiodarone HCl


  (Cordarone Tab)  200 mg  DAILY


 PO  11/5/17 09:00


 12/5/17 08:59  11/6/17 09:29


200 MG


 


 Aspirin


  (Ecotrin Tab)  81 mg  DAILY


 PO  11/5/17 09:00


 12/5/17 08:59  11/6/17 09:29


81 MG


 


 Atorvastatin


 Calcium


  (Lipitor Tab)  10 mg  DAILY


 PO  11/5/17 09:00


 12/5/17 08:59  11/6/17 09:29


10 MG


 


 Docusate Sodium


  (coLACE CAP)  100 mg  BID


 PO  11/4/17 21:00


 12/4/17 20:59   


 


 


 Acetaminophen/


 Hydrocodone Bitart


  (Norco 5/325 Tab)  1 tab  Q6H  PRN


 PO  11/4/17 14:45


 11/18/17 14:44   


 


 


 Levothyroxine


 Sodium


  (Synthroid Tab)  100 mcg  DAILYBB


 PO  11/5/17 06:00


 12/5/17 06:59  11/6/17 06:00


100 MCG


 


 Polyethylene


  (Miralax Powder


 Packet)  17 gm  DAILY  PRN


 PO  11/4/17 14:45


 12/4/17 14:44   


 


 


 Tamsulosin HCl


  (Flomax Cap)  0.4 mg  HS


 PO  11/4/17 21:00


 12/4/17 20:59  11/5/17 20:48


0.4 MG


 


 Warfarin Sodium


  (Coumadin Tab)  1 mg  MoWeFr@1600


 PO  11/6/17 16:00


 12/6/17 15:59   


 


 


 Warfarin Sodium


  (Coumadin Tab)  0.5 mg  SuTuThSa@1600


 PO  11/4/17 16:00


 12/4/17 15:59  11/5/17 16:02


0.5 MG


 


 Heparin Sodium


  (Porcine)


  (Heparin Sq 5000


 Unit/0.5ml)  5,000 unit  Q12


 SQ  11/4/17 21:00


 12/4/17 20:59  11/6/17 09:32


5,000 UNIT


 


 Miscellaneous


 Information


  (Pending Order)  1 ea  DAILY@10


 N/A  11/5/17 10:00


 12/5/17 09:59   


 


 


 Parenteral


 Electrolyte


 Solution  1,000 ml @ 


 125 mls/hr  Q8H


 IV  11/4/17 19:00


 12/4/17 18:59  11/6/17 11:13


125 MLS/HR


 


 Ceftriaxone


 Sodium 1 gm/


 Dextrose  50 ml @ 


 100 mls/hr  Q24H


 IV  11/6/17 11:00


 11/17/17 11:29  11/6/17 11:11


100 MLS/HR


 


 Vancomycin HCl


  (Vancomycin Oral


 Soln)  125 mg  QID


 PO  11/6/17 13:00


 11/20/17 12:59   


 


 


 Raspberry


  (Raspberry Syrup


 5ml Cup)  5 ml  QID


 PO  11/6/17 13:00


 11/20/17 12:59   


 








Vital Signs:











  Date Time  Temp Pulse Resp B/P (MAP) Pulse Ox O2 Delivery O2 Flow Rate FiO2


 


11/6/17 12:01 36.9 56 13 144/76 (98) 95 Room Air  


 


11/6/17 12:00      Room Air  


 


11/6/17 10:00  57 15 137/77 (97) 95 Room Air  


 


11/6/17 09:00  58 14 143/78 (99) 97 Room Air  


 


11/6/17 08:01 36.4 93 15 116/68 (84) 90 Room Air  


 


11/6/17 08:00      Room Air  


 


11/6/17 07:00  55 13 130/84 (99) 94 Room Air  


 


11/6/17 06:16  61 21 133/78 (96) 98 Room Air  


 


11/6/17 05:00  58 15 146/77 (100) 98 Room Air  


 


11/6/17 04:23     98 Nasal Cannula 2.0 


 


11/6/17 04:04 36.4       


 


11/6/17 04:01  86 15 128/70 (89) 98 Oxymask 2.0 


 


11/6/17 03:01  56 13 134/68 (90)    


 


11/6/17 02:01  54 14 139/74 (95) 97 Oxymask 2.0 


 


11/6/17 01:01  54 14 134/77 (96) 98 Oxymask 2.0 


 


11/6/17 00:28     98 Nasal Cannula 2.0 


 


11/6/17 00:00 36.7 56 6 138/81 (100) 99 Room Air  


 


11/5/17 23:00  63 15 127/82 (97) 99 Room Air  


 


11/5/17 22:00  61 12 128/73 (91) 95 Room Air  


 


11/5/17 21:01  61 16 116/71 (86) 95 Room Air  


 


11/5/17 20:06     98 Nasal Cannula 2.0 


 


11/5/17 20:01 36.6 59 14 124/75 (91) 94 Room Air  


 


11/5/17 19:00  58 14 128/67 (87) 87 Room Air  


 


11/5/17 18:00  59 16 105/64 (78) 96 Room Air  


 


11/5/17 16:00 36.4 60 13 108/62 (77) 96 Room Air  


 


11/5/17 16:00      Room Air  


 


11/5/17 15:00  60 14 109/68 (82) 95 Room Air  


 


11/5/17 14:01  61 16 92/55 (67) 98 Room Air  


 


11/5/17 13:00  62 13 100/62 (75) 96 Room Air  








Laboratory Results:





Last 24 Hours








Test


  11/5/17


14:08 11/5/17


16:11 11/5/17


21:48 11/6/17


05:21


 


Troponin I 0.127 ng/ml   0.077 ng/ml  


 


Thyroid Stimulating Hormone


(TSH) 4.570 uIu/ml 


  


  


  


 


 


Bedside Glucose  114 mg/dl   


 


White Blood Count    19.65 K/uL 


 


Red Blood Count    3.21 M/uL 


 


Hemoglobin    9.0 g/dL 


 


Hematocrit    28.7 % 


 


Mean Corpuscular Volume    89.4 fL 


 


Mean Corpuscular Hemoglobin    28.0 pg 


 


Mean Corpuscular Hemoglobin


Concent 


  


  


  31.4 g/dl 


 


 


Platelet Count    145 K/uL 


 


Mean Platelet Volume    10.8 fL 


 


Neutrophils (%) (Auto)    92.0 % 


 


Lymphocytes (%) (Auto)    3.1 % 


 


Monocytes (%) (Auto)    3.9 % 


 


Eosinophils (%) (Auto)    0.1 % 


 


Basophils (%) (Auto)    0.1 % 


 


Neutrophils # (Auto)    18.11 K/uL 


 


Lymphocytes # (Auto)    0.61 K/uL 


 


Monocytes # (Auto)    0.76 K/uL 


 


Eosinophils # (Auto)    0.01 K/uL 


 


Basophils # (Auto)    0.01 K/uL 


 


RDW Standard Deviation    54.2 fL 


 


RDW Coefficient of Variation    16.6 % 


 


Immature Granulocyte % (Auto)    0.8 % 


 


Immature Granulocyte # (Auto)    0.15 K/uL 


 


Nucleated RBC Absolute Count


(auto) 


  


  


  0.05 K/uL 


 


 


Nucleated Red Blood Cells %    0.2 % 


 


Dohle Bodies    1+ 


 


Platelet Estimate    NORMAL 


 


Echinocytes    1+ 


 


Prothrombin Time    13.5 SECONDS 


 


Prothromb Time International


Ratio 


  


  


  1.3 


 


 


Sodium Level    138 mmol/L 


 


Potassium Level    3.5 mmol/L 


 


Chloride Level    104 mmol/L 


 


Carbon Dioxide Level    26 mmol/L 


 


Anion Gap    8.0 mmol/L 


 


Blood Urea Nitrogen    24 mg/dl 


 


Creatinine    1.36 mg/dl 


 


Est Creatinine Clear Calc


Drug Dose 


  


  


  46.7 ml/min 


 


 


Estimated GFR (


American) 


  


  


  54.2 


 


 


Estimated GFR (Non-


American 


  


  


  46.8 


 


 


BUN/Creatinine Ratio    17.9 


 


Random Glucose    103 mg/dl 


 


Lactic Acid Level    0.9 mmol/L 


 


Calcium Level    6.9 mg/dl 


 


Phosphorus Level    2.2 mg/dl 


 


Magnesium Level    2.3 mg/dl 


 


Total Bilirubin    0.6 mg/dl 


 


Direct Bilirubin    0.2 mg/dl 


 


Aspartate Amino Transf


(AST/SGOT) 


  


  


  115 U/L 


 


 


Alanine Aminotransferase


(ALT/SGPT) 


  


  


  110 U/L 


 


 


Alkaline Phosphatase    126 U/L 


 


Total Protein    5.2 gm/dl 


 


Albumin    1.8 gm/dl 


 


Procalcitonin    170.25 ng/ml 


 


Test


  11/6/17


06:13 


  


  


 


 


Bedside Glucose 109 mg/dl

## 2017-11-07 VITALS — OXYGEN SATURATION: 98 % | SYSTOLIC BLOOD PRESSURE: 121 MMHG | HEART RATE: 58 BPM | DIASTOLIC BLOOD PRESSURE: 73 MMHG

## 2017-11-07 VITALS — OXYGEN SATURATION: 93 %

## 2017-11-07 VITALS
SYSTOLIC BLOOD PRESSURE: 104 MMHG | OXYGEN SATURATION: 94 % | DIASTOLIC BLOOD PRESSURE: 61 MMHG | TEMPERATURE: 97.52 F | HEART RATE: 74 BPM

## 2017-11-07 VITALS
SYSTOLIC BLOOD PRESSURE: 109 MMHG | TEMPERATURE: 97.34 F | OXYGEN SATURATION: 93 % | HEART RATE: 57 BPM | DIASTOLIC BLOOD PRESSURE: 68 MMHG

## 2017-11-07 LAB
ANION GAP SERPL CALC-SCNC: 8 MMOL/L (ref 3–11)
BASOPHILS # BLD: 0.02 K/UL (ref 0–0.2)
BASOPHILS NFR BLD: 0.1 %
BUN SERPL-MCNC: 24 MG/DL (ref 7–18)
BUN/CREAT SERPL: 20.9 (ref 10–20)
CALCIUM SERPL-MCNC: 7.2 MG/DL (ref 8.5–10.1)
CHLORIDE SERPL-SCNC: 106 MMOL/L (ref 98–107)
CO2 SERPL-SCNC: 27 MMOL/L (ref 21–32)
COMPLETE: YES
CREAT CL PREDICTED SERPL C-G-VRATE: 55.9 ML/MIN
CREAT SERPL-MCNC: 1.16 MG/DL (ref 0.6–1.4)
EOSINOPHIL NFR BLD AUTO: 192 K/UL (ref 130–400)
GLUCOSE SERPL-MCNC: 87 MG/DL (ref 70–99)
HCT VFR BLD CALC: 29.3 % (ref 42–52)
IG%: 1.2 %
IMM GRANULOCYTES NFR BLD AUTO: 6.5 %
INR PPP: 1.3 (ref 0.9–1.1)
LYMPHOCYTES # BLD: 1.07 K/UL (ref 1.2–3.4)
MAGNESIUM SERPL-MCNC: 2.5 MG/DL (ref 1.8–2.4)
MCH RBC QN AUTO: 27.9 PG (ref 25–34)
MCHC RBC AUTO-ENTMCNC: 31.4 G/DL (ref 32–36)
MCV RBC AUTO: 88.8 FL (ref 80–100)
MONOCYTES NFR BLD: 5.1 %
NEUTROPHILS # BLD AUTO: 0.1 %
NEUTROPHILS NFR BLD AUTO: 87 %
NRBC BLD AUTO-RTO: 0.1 %
PHOSPHATE SERPL-MCNC: 1.2 MG/DL (ref 2.5–4.9)
PMV BLD AUTO: 11.2 FL (ref 7.4–10.4)
POTASSIUM SERPL-SCNC: 3 MMOL/L (ref 3.5–5.1)
PROTHROMBIN TIME: 14.1 SECONDS (ref 9–12)
RBC # BLD AUTO: 3.3 M/UL (ref 4.7–6.1)
SODIUM SERPL-SCNC: 140 MMOL/L (ref 136–145)
WBC # BLD AUTO: 16.37 K/UL (ref 4.8–10.8)

## 2017-11-07 RX ADMIN — Medication SCH MG: at 07:35

## 2017-11-07 RX ADMIN — LEVOTHYROXINE SODIUM SCH MCG: 100 TABLET ORAL at 04:53

## 2017-11-07 RX ADMIN — SODIUM CHLORIDE, SODIUM ACETATE ANHYDROUS, SODIUM GLUCONATE, POTASSIUM CHLORIDE, AND MAGNESIUM CHLORIDE SCH MLS/HR: 526; 222; 502; 37; 30 INJECTION, SOLUTION INTRAVENOUS at 20:01

## 2017-11-07 RX ADMIN — PANTOPRAZOLE SCH MG: 40 TABLET, DELAYED RELEASE ORAL at 07:35

## 2017-11-07 RX ADMIN — DOCUSATE SODIUM SCH MG: 100 CAPSULE, LIQUID FILLED ORAL at 20:02

## 2017-11-07 RX ADMIN — Medication SCH ML: at 07:35

## 2017-11-07 RX ADMIN — Medication SCH ML: at 16:32

## 2017-11-07 RX ADMIN — Medication SCH ML: at 20:03

## 2017-11-07 RX ADMIN — Medication SCH ML: at 12:45

## 2017-11-07 RX ADMIN — HEPARIN SODIUM SCH UNIT: 10000 INJECTION, SOLUTION INTRAVENOUS; SUBCUTANEOUS at 20:10

## 2017-11-07 RX ADMIN — VANCOMYCIN HYDROCHLORIDE SCH MG: 1 INJECTION, POWDER, LYOPHILIZED, FOR SOLUTION INTRAVENOUS at 20:02

## 2017-11-07 RX ADMIN — TAMSULOSIN HYDROCHLORIDE SCH MG: 0.4 CAPSULE ORAL at 20:11

## 2017-11-07 RX ADMIN — POTASSIUM CHLORIDE SCH MLS/HR: 10 INJECTION, SOLUTION INTRAVENOUS at 11:55

## 2017-11-07 RX ADMIN — CIPROFLOXACIN SCH MG: 500 TABLET, FILM COATED ORAL at 20:02

## 2017-11-07 RX ADMIN — SODIUM CHLORIDE, SODIUM ACETATE ANHYDROUS, SODIUM GLUCONATE, POTASSIUM CHLORIDE, AND MAGNESIUM CHLORIDE SCH MLS/HR: 526; 222; 502; 37; 30 INJECTION, SOLUTION INTRAVENOUS at 03:17

## 2017-11-07 RX ADMIN — HEPARIN SODIUM SCH UNIT: 10000 INJECTION, SOLUTION INTRAVENOUS; SUBCUTANEOUS at 07:39

## 2017-11-07 RX ADMIN — CIPROFLOXACIN SCH MG: 500 TABLET, FILM COATED ORAL at 11:41

## 2017-11-07 RX ADMIN — VANCOMYCIN HYDROCHLORIDE SCH MG: 1 INJECTION, POWDER, LYOPHILIZED, FOR SOLUTION INTRAVENOUS at 12:45

## 2017-11-07 RX ADMIN — DOCUSATE SODIUM SCH MG: 100 CAPSULE, LIQUID FILLED ORAL at 07:30

## 2017-11-07 RX ADMIN — AMIODARONE HYDROCHLORIDE SCH MG: 200 TABLET ORAL at 07:35

## 2017-11-07 RX ADMIN — SODIUM CHLORIDE, SODIUM ACETATE ANHYDROUS, SODIUM GLUCONATE, POTASSIUM CHLORIDE, AND MAGNESIUM CHLORIDE SCH MLS/HR: 526; 222; 502; 37; 30 INJECTION, SOLUTION INTRAVENOUS at 12:45

## 2017-11-07 RX ADMIN — ATORVASTATIN CALCIUM SCH MG: 10 TABLET, FILM COATED ORAL at 07:35

## 2017-11-07 RX ADMIN — VANCOMYCIN HYDROCHLORIDE SCH MG: 1 INJECTION, POWDER, LYOPHILIZED, FOR SOLUTION INTRAVENOUS at 07:35

## 2017-11-07 RX ADMIN — VANCOMYCIN HYDROCHLORIDE SCH MG: 1 INJECTION, POWDER, LYOPHILIZED, FOR SOLUTION INTRAVENOUS at 16:32

## 2017-11-07 RX ADMIN — WARFARIN SCH MG: 2 TABLET ORAL at 16:32

## 2017-11-07 RX ADMIN — POTASSIUM CHLORIDE SCH MLS/HR: 10 INJECTION, SOLUTION INTRAVENOUS at 10:52

## 2017-11-07 NOTE — PROGRESS NOTE
Medicine Progress Note


Date & Time of Visit:


Nov 7, 2017 at 16:24.


Subjective


tolerating PO


doing well overall


asymptomatic





Objective





Last 8 Hrs








  Date Time  Temp Pulse Resp B/P (MAP) Pulse Ox O2 Delivery O2 Flow Rate FiO2


 


11/7/17 16:15      Room Air  


 


11/7/17 15:41 36.3 57 18 109/68 (82) 93 Room Air  








Physical Exam:


GEN: WNWD, in no acute distress, alert and appropriate, lying supine in bed, 

struggles to sit up without assistance. Deconditioned.  


HEENT: NC/AT, normal sclerae, mucous membranes moist


CARDIO: reg rate, S1/2 heard without m/g/r


LUNGS: CTA bilaterally, no crackles, rales or wheezes, good diaphragmatic 

excursion


ABD: soft, non-tender, non-distended, no rebound or guarding, +BS, no CVA 

tenderness appreciated. 


EXTREMITY: RP and DP palpable 2+ bilat, trace swelling bilaterally, hard cast 

to L lower leg, toes warm and well perfused bilaterally


N/M:  alert and appropriate, +bilateral hand tremors noted. Able to moves 

extremities equally.  Deconditioned. 


SKIN:  warm and dry


Laboratory Results:


11/7/17 07:11








Red Blood Count 3.30, Mean Corpuscular Volume 88.8, Mean Corpuscular Hemoglobin 

27.9, Mean Corpuscular Hemoglobin Concent 31.4, Mean Platelet Volume 11.2, 

Neutrophils (%) (Auto) 87.0, Lymphocytes (%) (Auto) 6.5, Monocytes (%) (Auto) 

5.1, Eosinophils (%) (Auto) 0.1, Basophils (%) (Auto) 0.1, Neutrophils # (Auto) 

14.23, Lymphocytes # (Auto) 1.07, Monocytes # (Auto) 0.84, Eosinophils # (Auto) 

0.02, Basophils # (Auto) 0.02





11/7/17 07:11

















Test


  11/4/17


11:57 11/4/17


12:50 11/4/17


13:00 11/4/17


13:03


 


Urine Color DK YELLOW    


 


Urine Appearance TURBID (CLEAR)    


 


Urine pH 5.0 (4.5-7.5)    


 


Urine Specific Gravity


  1.016


(1.000-1.030) 


  


  


 


 


Urine Protein 2+ (NEG)    


 


Urine Glucose (UA) NEG (NEG)    


 


Urine Ketones NEG (NEG)    


 


Urine Occult Blood 3+ (NEG)    


 


Urine Nitrite NEG (NEG)    


 


Urine Bilirubin NEG (NEG)    


 


Urine Urobilinogen NEG (NEG)    


 


Urine Leukocyte Esterase LARGE (NEG)    


 


Urine WBC (Auto) >30 /hpf (0-5)    


 


Urine RBC (Auto)


  10-30 /hpf


(0-4) 


  


  


 


 


Urine Hyaline Casts (Auto) 1-5 /lpf (0-5)    


 


Urine Epithelial Cells (Auto) 0-5 /lpf (0-5)    


 


Urine Bacteria (Auto) 4+ (NEG)    


 


Polychromasia  1+   


 


Total Creatine Kinase


  


  44 U/L


() 


  


 


 


Creatine Kinase MB


  


  0.9 ng/ml


(0.5-3.6) 


  


 


 


Creatine Kinase MB Ratio  2.0 (0-3.0)   


 


Bedside Lactic Acid Venous


  


  


  2.99 mmol/L


(0.90-1.70) 


 


 


Bedside Hemoglobin


  


  


  


  9.9 g/dl


(14.0-18.0)


 


Bedside Hematocrit    29 % (42-52) 


 


Bedside Sodium


  


  


  


  138 mEq/L


(135-144)


 


Bedside Potassium


  


  


  


  3.7 mEq/L


(3.3-5.0)


 


Bedside Chloride


  


  


  


  101 mEq/L


(101-112)


 


Bedside Total CO2


  


  


  


  25 mEq/l


(24-31)


 


Bedside Blood Urea Nitrogen


  


  


  


  24 mg/dl


(7-18)


 


Bedside Creatinine


  


  


  


  1.8 mg/dl


(0.6-1.3)


 


Bedside Glucose (other)


  


  


  


  86 mg/dl


(70-99)


 


Bedside Ionized Calcium (Kee)


  


  


  


  1.10 mmol/l


(1.12-1.32)


 


Test


  11/5/17


06:42 11/5/17


14:08 11/5/17


21:48 11/6/17


05:21


 


Toxic Vacuolation 2+    


 


Thyroid Stimulating Hormone


(TSH) 


  4.570 uIu/ml


(0.300-4.500) 


  


 


 


Troponin I


  


  


  0.077 ng/ml


(0-0.045) 


 


 


Dohle Bodies    1+ 


 


Platelet Estimate    NORMAL 


 


Echinocytes    1+ 


 


Lactic Acid Level


  


  


  


  0.9 mmol/L


(0.4-2.0)


 


Total Bilirubin


  


  


  


  0.6 mg/dl


(0.2-1)


 


Direct Bilirubin


  


  


  


  0.2 mg/dl


(0-0.2)


 


Aspartate Amino Transf


(AST/SGOT) 


  


  


  115 U/L


(15-37)


 


Alanine Aminotransferase


(ALT/SGPT) 


  


  


  110 U/L


(12-78)


 


Alkaline Phosphatase


  


  


  


  126 U/L


()


 


Total Protein


  


  


  


  5.2 gm/dl


(6.4-8.2)


 


Albumin


  


  


  


  1.8 gm/dl


(3.4-5.0)


 


Procalcitonin


  


  


  


  170.25 ng/ml


(0-0.5)


 


Test


  11/6/17


11:07 11/7/17


07:11 


  


 


 


Bedside Glucose


  94 mg/dl


(70-99) 


  


  


 


 


White Blood Count


  


  16.37 K/uL


(4.8-10.8) 


  


 


 


Red Blood Count


  


  3.30 M/uL


(4.7-6.1) 


  


 


 


Hemoglobin


  


  9.2 g/dL


(14.0-18.0) 


  


 


 


Hematocrit  29.3 % (42-52)   


 


Mean Corpuscular Volume


  


  88.8 fL


() 


  


 


 


Mean Corpuscular Hemoglobin


  


  27.9 pg


(25-34) 


  


 


 


Mean Corpuscular Hemoglobin


Concent 


  31.4 g/dl


(32-36) 


  


 


 


Platelet Count


  


  192 K/uL


(130-400) 


  


 


 


Mean Platelet Volume


  


  11.2 fL


(7.4-10.4) 


  


 


 


Neutrophils (%) (Auto)  87.0 %   


 


Lymphocytes (%) (Auto)  6.5 %   


 


Monocytes (%) (Auto)  5.1 %   


 


Eosinophils (%) (Auto)  0.1 %   


 


Basophils (%) (Auto)  0.1 %   


 


Neutrophils # (Auto)


  


  14.23 K/uL


(1.4-6.5) 


  


 


 


Lymphocytes # (Auto)


  


  1.07 K/uL


(1.2-3.4) 


  


 


 


Monocytes # (Auto)


  


  0.84 K/uL


(0.11-0.59) 


  


 


 


Eosinophils # (Auto)


  


  0.02 K/uL


(0-0.5) 


  


 


 


Basophils # (Auto)


  


  0.02 K/uL


(0-0.2) 


  


 


 


RDW Standard Deviation


  


  53.7 fL


(36.4-46.3) 


  


 


 


RDW Coefficient of Variation


  


  16.4 %


(11.5-14.5) 


  


 


 


Immature Granulocyte % (Auto)  1.2 %   


 


Immature Granulocyte # (Auto)


  


  0.19 K/uL


(0.00-0.02) 


  


 


 


Nucleated RBC Absolute Count


(auto) 


  0.02 K/uL


(0-0) 


  


 


 


Nucleated Red Blood Cells %  0.1 %   


 


Prothrombin Time


  


  14.1 SECONDS


(9.0-12.0) 


  


 


 


Prothromb Time International


Ratio 


  1.3 (0.9-1.1) 


  


  


 


 


Anion Gap


  


  8.0 mmol/L


(3-11) 


  


 


 


Est Creatinine Clear Calc


Drug Dose 


  55.9 ml/min 


  


  


 


 


Estimated GFR (


American) 


  65.7 


  


  


 


 


Estimated GFR (Non-


American 


  56.7 


  


  


 


 


BUN/Creatinine Ratio  20.9 (10-20)   


 


Calcium Level


  


  7.2 mg/dl


(8.5-10.1) 


  


 


 


Phosphorus Level


  


  1.2 mg/dl


(2.5-4.9) 


  


 


 


Magnesium Level


  


  2.5 mg/dl


(1.8-2.4) 


  


 














 Date/Time


Source Procedure


Growth Status


 


 


 11/4/17 12:45


Blood Blood Culture - Preliminary


NO GROWTH TO DATE. Resulted


 


 11/4/17 16:40


Nasal MRSA DNA Surveillance Screen - Final


Specimen Negative for MRSA by DNA Probe Complete


 


 11/6/17 06:14


Stool C.difficile Toxin B Gene (PCR) - Final


Positive for C. difficile toxin B gene Complete


 


 11/4/17 11:57


Urine,Catheterized Urine Culture - Final


Escherichia Coli Complete








Last 24 Hours








Test


  11/7/17


07:11


 


White Blood Count 16.37 K/uL 


 


Red Blood Count 3.30 M/uL 


 


Hemoglobin 9.2 g/dL 


 


Hematocrit 29.3 % 


 


Mean Corpuscular Volume 88.8 fL 


 


Mean Corpuscular Hemoglobin 27.9 pg 


 


Mean Corpuscular Hemoglobin


Concent 31.4 g/dl 


 


 


Platelet Count 192 K/uL 


 


Mean Platelet Volume 11.2 fL 


 


Neutrophils (%) (Auto) 87.0 % 


 


Lymphocytes (%) (Auto) 6.5 % 


 


Monocytes (%) (Auto) 5.1 % 


 


Eosinophils (%) (Auto) 0.1 % 


 


Basophils (%) (Auto) 0.1 % 


 


Neutrophils # (Auto) 14.23 K/uL 


 


Lymphocytes # (Auto) 1.07 K/uL 


 


Monocytes # (Auto) 0.84 K/uL 


 


Eosinophils # (Auto) 0.02 K/uL 


 


Basophils # (Auto) 0.02 K/uL 


 


RDW Standard Deviation 53.7 fL 


 


RDW Coefficient of Variation 16.4 % 


 


Immature Granulocyte % (Auto) 1.2 % 


 


Immature Granulocyte # (Auto) 0.19 K/uL 


 


Nucleated RBC Absolute Count


(auto) 0.02 K/uL 


 


 


Nucleated Red Blood Cells % 0.1 % 


 


Prothrombin Time 14.1 SECONDS 


 


Prothromb Time International


Ratio 1.3 


 


 


Sodium Level 140 mmol/L 


 


Potassium Level 3.0 mmol/L 


 


Chloride Level 106 mmol/L 


 


Carbon Dioxide Level 27 mmol/L 


 


Anion Gap 8.0 mmol/L 


 


Blood Urea Nitrogen 24 mg/dl 


 


Creatinine 1.16 mg/dl 


 


Est Creatinine Clear Calc


Drug Dose 55.9 ml/min 


 


 


Estimated GFR (


American) 65.7 


 


 


Estimated GFR (Non-


American 56.7 


 


 


BUN/Creatinine Ratio 20.9 


 


Random Glucose 87 mg/dl 


 


Calcium Level 7.2 mg/dl 


 


Phosphorus Level 1.2 mg/dl 


 


Magnesium Level 2.5 mg/dl 











Assessment & Plan


87 yo M with septic shock likely 2/2 pyelo. 





1.  Sepsis 2/2 E coli-resuscitated and off Levophed.  Leg is in cast but 

patient denies any pain.  Vanc and Zosyn switched to Rocephin IV as pt not 

quite tolerating PO with switch today to Cipro POBlood cultures negative to 

date. Continues to improve clinically.


2.  c-diff diarrhea-vancomycin was started yesterday, diarrhea is improving.  I 

educated the patient regarding reducing transmission.  


2.  Carotid disease-cont ASA 81


3.  PAF-on coumadin and subtherapeutic.  Cont coumadin and amiodarone.  No AV 

mary blocking agents and rate is controlled. 


4.  Tremors s/p deep brain stimulators-these are currently turned off.  


5.  NEGAR in setting CKD Stage III-resolved.  Baseline creat 1.3-1.6.  


6.  Hypothyroidism-cont Synthroid at home dose  


7.  Hypokalemia-replace


8.  Hypophosphatemia-replace





DVT proph-coumadin and heparin while subtherapeutic


Full Code per discussion with patient, wife and daughter on admission


Dispo-transfer to telemetry.  Push early ambulation as tolerated.  PT/OT.  





DO Silvano Segovia Hospitalist


Consultants:


ICU


Current Inpatient Medications:





Current Inpatient Medications








 Medications


  (Trade)  Dose


 Ordered  Sig/Deanne


 Route  Start Time


 Stop Time Status Last Admin


Dose Admin


 


 Acetaminophen


  (Tylenol Tab)  650 mg  Q4H  PRN


 PO  11/4/17 14:30


 12/4/17 14:29   


 


 


 Pantoprazole


 Sodium


  (Protonix Tab)  40 mg  DAILY


 PO  11/5/17 09:00


 12/5/17 08:59  11/7/17 07:35


40 MG


 


 Amiodarone HCl


  (Cordarone Tab)  200 mg  DAILY


 PO  11/5/17 09:00


 12/5/17 08:59  11/7/17 07:35


200 MG


 


 Aspirin


  (Ecotrin Tab)  81 mg  DAILY


 PO  11/5/17 09:00


 12/5/17 08:59  11/7/17 07:35


81 MG


 


 Atorvastatin


 Calcium


  (Lipitor Tab)  10 mg  DAILY


 PO  11/5/17 09:00


 12/5/17 08:59  11/7/17 07:35


10 MG


 


 Docusate Sodium


  (coLACE CAP)  100 mg  BID


 PO  11/4/17 21:00


 12/4/17 20:59   


 


 


 Acetaminophen/


 Hydrocodone Bitart


  (Norco 5/325 Tab)  1 tab  Q6H  PRN


 PO  11/4/17 14:45


 11/18/17 14:44   


 


 


 Levothyroxine


 Sodium


  (Synthroid Tab)  100 mcg  DAILYBB


 PO  11/5/17 06:00


 12/5/17 06:59  11/7/17 04:53


100 MCG


 


 Polyethylene


  (Miralax Powder


 Packet)  17 gm  DAILY  PRN


 PO  11/4/17 14:45


 12/4/17 14:44   


 


 


 Tamsulosin HCl


  (Flomax Cap)  0.4 mg  HS


 PO  11/4/17 21:00


 12/4/17 20:59  11/6/17 19:43


0.4 MG


 


 Heparin Sodium


  (Porcine)


  (Heparin Sq 5000


 Unit/0.5ml)  5,000 unit  Q12


 SQ  11/4/17 21:00


 12/4/17 20:59  11/7/17 07:39


5,000 UNIT


 


 Miscellaneous


 Information


  (Pending Order)  1 ea  DAILY@10


 N/A  11/5/17 10:00


 12/5/17 09:59   


 


 


 Parenteral


 Electrolyte


 Solution  1,000 ml @ 


 125 mls/hr  Q8H


 IV  11/4/17 19:00


 12/4/17 18:59  11/7/17 12:45


125 MLS/HR


 


 Vancomycin HCl


  (Vancomycin Oral


 Soln)  125 mg  QID


 PO  11/6/17 13:00


 11/20/17 12:59  11/7/17 12:45


125 MG


 


 Raspberry


  (Raspberry Syrup


 5ml Cup)  5 ml  QID


 PO  11/6/17 13:00


 11/20/17 12:59  11/7/17 12:45


5 ML


 


 Warfarin Sodium


  (Coumadin Tab)  2 mg  DAILY@16


 PO  11/7/17 16:00


 12/7/17 15:59   


 


 


 Ciprofloxacin


  (Cipro Tab)  500 mg  BID


 PO  11/7/17 11:00


 11/12/17 10:59  11/7/17 11:41


500 MG

## 2017-11-08 VITALS
HEART RATE: 60 BPM | TEMPERATURE: 97.7 F | DIASTOLIC BLOOD PRESSURE: 74 MMHG | SYSTOLIC BLOOD PRESSURE: 156 MMHG | OXYGEN SATURATION: 97 %

## 2017-11-08 VITALS
DIASTOLIC BLOOD PRESSURE: 72 MMHG | SYSTOLIC BLOOD PRESSURE: 117 MMHG | TEMPERATURE: 97.34 F | OXYGEN SATURATION: 93 % | HEART RATE: 61 BPM

## 2017-11-08 VITALS
HEART RATE: 66 BPM | TEMPERATURE: 97.7 F | OXYGEN SATURATION: 91 % | SYSTOLIC BLOOD PRESSURE: 123 MMHG | DIASTOLIC BLOOD PRESSURE: 62 MMHG

## 2017-11-08 LAB
ANION GAP SERPL CALC-SCNC: 7 MMOL/L (ref 3–11)
BASOPHILS # BLD: 0.04 K/UL (ref 0–0.2)
BASOPHILS NFR BLD: 0.4 %
BUN SERPL-MCNC: 19 MG/DL (ref 7–18)
BUN/CREAT SERPL: 17.3 (ref 10–20)
CALCIUM SERPL-MCNC: 7.4 MG/DL (ref 8.5–10.1)
CHLORIDE SERPL-SCNC: 105 MMOL/L (ref 98–107)
CO2 SERPL-SCNC: 29 MMOL/L (ref 21–32)
COMPLETE: YES
CREAT CL PREDICTED SERPL C-G-VRATE: 57.9 ML/MIN
CREAT SERPL-MCNC: 1.12 MG/DL (ref 0.6–1.4)
EOSINOPHIL NFR BLD AUTO: 209 K/UL (ref 130–400)
GLUCOSE SERPL-MCNC: 75 MG/DL (ref 70–99)
HCT VFR BLD CALC: 32.3 % (ref 42–52)
IG%: 4.7 %
IMM GRANULOCYTES NFR BLD AUTO: 9.7 %
INR PPP: 1.4 (ref 0.9–1.1)
LYMPHOCYTES # BLD: 0.98 K/UL (ref 1.2–3.4)
MAGNESIUM SERPL-MCNC: 2.4 MG/DL (ref 1.8–2.4)
MCH RBC QN AUTO: 27.2 PG (ref 25–34)
MCHC RBC AUTO-ENTMCNC: 30.3 G/DL (ref 32–36)
MCV RBC AUTO: 89.7 FL (ref 80–100)
MONOCYTES NFR BLD: 5.6 %
NEUTROPHILS # BLD AUTO: 1.7 %
NEUTROPHILS NFR BLD AUTO: 77.9 %
PHOSPHATE SERPL-MCNC: 1.8 MG/DL (ref 2.5–4.9)
PMV BLD AUTO: 11.5 FL (ref 7.4–10.4)
POTASSIUM SERPL-SCNC: 3.2 MMOL/L (ref 3.5–5.1)
PROTHROMBIN TIME: 15.6 SECONDS (ref 9–12)
RBC # BLD AUTO: 3.6 M/UL (ref 4.7–6.1)
SODIUM SERPL-SCNC: 141 MMOL/L (ref 136–145)
WBC # BLD AUTO: 10.08 K/UL (ref 4.8–10.8)

## 2017-11-08 RX ADMIN — Medication SCH ML: at 21:06

## 2017-11-08 RX ADMIN — LEVOTHYROXINE SODIUM SCH MCG: 100 TABLET ORAL at 04:33

## 2017-11-08 RX ADMIN — SODIUM CHLORIDE SCH MLS/HR: 900 INJECTION, SOLUTION INTRAVENOUS at 12:45

## 2017-11-08 RX ADMIN — DOCUSATE SODIUM SCH MG: 100 CAPSULE, LIQUID FILLED ORAL at 07:36

## 2017-11-08 RX ADMIN — AMIODARONE HYDROCHLORIDE SCH MG: 200 TABLET ORAL at 07:40

## 2017-11-08 RX ADMIN — ATORVASTATIN CALCIUM SCH MG: 10 TABLET, FILM COATED ORAL at 07:39

## 2017-11-08 RX ADMIN — Medication SCH ML: at 07:39

## 2017-11-08 RX ADMIN — WARFARIN SCH MG: 2 TABLET ORAL at 16:06

## 2017-11-08 RX ADMIN — CIPROFLOXACIN SCH MG: 500 TABLET, FILM COATED ORAL at 21:07

## 2017-11-08 RX ADMIN — VANCOMYCIN HYDROCHLORIDE SCH MG: 1 INJECTION, POWDER, LYOPHILIZED, FOR SOLUTION INTRAVENOUS at 17:36

## 2017-11-08 RX ADMIN — SODIUM CHLORIDE SCH MLS/HR: 900 INJECTION, SOLUTION INTRAVENOUS at 23:26

## 2017-11-08 RX ADMIN — Medication SCH MG: at 07:39

## 2017-11-08 RX ADMIN — VANCOMYCIN HYDROCHLORIDE SCH MG: 1 INJECTION, POWDER, LYOPHILIZED, FOR SOLUTION INTRAVENOUS at 21:06

## 2017-11-08 RX ADMIN — VANCOMYCIN HYDROCHLORIDE SCH MG: 1 INJECTION, POWDER, LYOPHILIZED, FOR SOLUTION INTRAVENOUS at 07:39

## 2017-11-08 RX ADMIN — VANCOMYCIN HYDROCHLORIDE SCH MG: 1 INJECTION, POWDER, LYOPHILIZED, FOR SOLUTION INTRAVENOUS at 12:45

## 2017-11-08 RX ADMIN — SODIUM CHLORIDE, SODIUM ACETATE ANHYDROUS, SODIUM GLUCONATE, POTASSIUM CHLORIDE, AND MAGNESIUM CHLORIDE SCH MLS/HR: 526; 222; 502; 37; 30 INJECTION, SOLUTION INTRAVENOUS at 03:42

## 2017-11-08 RX ADMIN — Medication SCH ML: at 17:36

## 2017-11-08 RX ADMIN — Medication SCH ML: at 12:45

## 2017-11-08 RX ADMIN — HEPARIN SODIUM SCH UNIT: 10000 INJECTION, SOLUTION INTRAVENOUS; SUBCUTANEOUS at 21:09

## 2017-11-08 RX ADMIN — HEPARIN SODIUM SCH UNIT: 10000 INJECTION, SOLUTION INTRAVENOUS; SUBCUTANEOUS at 07:44

## 2017-11-08 RX ADMIN — PANTOPRAZOLE SCH MG: 40 TABLET, DELAYED RELEASE ORAL at 07:39

## 2017-11-08 RX ADMIN — CIPROFLOXACIN SCH MG: 500 TABLET, FILM COATED ORAL at 07:39

## 2017-11-08 RX ADMIN — TAMSULOSIN HYDROCHLORIDE SCH MG: 0.4 CAPSULE ORAL at 21:08

## 2017-11-09 VITALS
SYSTOLIC BLOOD PRESSURE: 126 MMHG | DIASTOLIC BLOOD PRESSURE: 75 MMHG | TEMPERATURE: 97.7 F | OXYGEN SATURATION: 94 % | HEART RATE: 59 BPM

## 2017-11-09 VITALS
SYSTOLIC BLOOD PRESSURE: 145 MMHG | DIASTOLIC BLOOD PRESSURE: 80 MMHG | HEART RATE: 61 BPM | TEMPERATURE: 98.06 F | OXYGEN SATURATION: 94 %

## 2017-11-09 VITALS — DIASTOLIC BLOOD PRESSURE: 77 MMHG | SYSTOLIC BLOOD PRESSURE: 158 MMHG | OXYGEN SATURATION: 96 % | HEART RATE: 64 BPM

## 2017-11-09 VITALS
DIASTOLIC BLOOD PRESSURE: 76 MMHG | OXYGEN SATURATION: 92 % | SYSTOLIC BLOOD PRESSURE: 136 MMHG | HEART RATE: 136 BPM | TEMPERATURE: 97.34 F

## 2017-11-09 VITALS
SYSTOLIC BLOOD PRESSURE: 139 MMHG | OXYGEN SATURATION: 94 % | TEMPERATURE: 97.34 F | HEART RATE: 62 BPM | DIASTOLIC BLOOD PRESSURE: 71 MMHG

## 2017-11-09 VITALS — HEART RATE: 64 BPM | OXYGEN SATURATION: 96 %

## 2017-11-09 VITALS — OXYGEN SATURATION: 94 %

## 2017-11-09 VITALS — OXYGEN SATURATION: 96 %

## 2017-11-09 LAB
ANION GAP SERPL CALC-SCNC: 7 MMOL/L (ref 3–11)
BUN SERPL-MCNC: 15 MG/DL (ref 7–18)
BUN/CREAT SERPL: 13.4 (ref 10–20)
CALCIUM SERPL-MCNC: 7.6 MG/DL (ref 8.5–10.1)
CHLORIDE SERPL-SCNC: 108 MMOL/L (ref 98–107)
CO2 SERPL-SCNC: 25 MMOL/L (ref 21–32)
CREAT CL PREDICTED SERPL C-G-VRATE: 57.9 ML/MIN
CREAT SERPL-MCNC: 1.12 MG/DL (ref 0.6–1.4)
EOSINOPHIL NFR BLD AUTO: 215 K/UL (ref 130–400)
GLUCOSE SERPL-MCNC: 110 MG/DL (ref 70–99)
HCT VFR BLD CALC: 32.1 % (ref 42–52)
INR PPP: 1.7 (ref 0.9–1.1)
MAGNESIUM SERPL-MCNC: 2.2 MG/DL (ref 1.8–2.4)
MCH RBC QN AUTO: 28.2 PG (ref 25–34)
MCHC RBC AUTO-ENTMCNC: 31.5 G/DL (ref 32–36)
MCV RBC AUTO: 89.7 FL (ref 80–100)
PHOSPHATE SERPL-MCNC: 2.4 MG/DL (ref 2.5–4.9)
PMV BLD AUTO: 11.6 FL (ref 7.4–10.4)
POTASSIUM SERPL-SCNC: 3.8 MMOL/L (ref 3.5–5.1)
PROTHROMBIN TIME: 18.2 SECONDS (ref 9–12)
RBC # BLD AUTO: 3.58 M/UL (ref 4.7–6.1)
SODIUM SERPL-SCNC: 140 MMOL/L (ref 136–145)
WBC # BLD AUTO: 11.65 K/UL (ref 4.8–10.8)

## 2017-11-09 RX ADMIN — AMIODARONE HYDROCHLORIDE SCH MG: 200 TABLET ORAL at 08:07

## 2017-11-09 RX ADMIN — HEPARIN SODIUM SCH UNIT: 10000 INJECTION, SOLUTION INTRAVENOUS; SUBCUTANEOUS at 21:33

## 2017-11-09 RX ADMIN — Medication SCH ML: at 17:44

## 2017-11-09 RX ADMIN — LEVOTHYROXINE SODIUM SCH MCG: 100 TABLET ORAL at 06:19

## 2017-11-09 RX ADMIN — Medication SCH MG: at 08:07

## 2017-11-09 RX ADMIN — CIPROFLOXACIN SCH MG: 500 TABLET, FILM COATED ORAL at 08:07

## 2017-11-09 RX ADMIN — Medication SCH ML: at 08:06

## 2017-11-09 RX ADMIN — VANCOMYCIN HYDROCHLORIDE SCH MG: 1 INJECTION, POWDER, LYOPHILIZED, FOR SOLUTION INTRAVENOUS at 17:44

## 2017-11-09 RX ADMIN — CIPROFLOXACIN SCH MG: 500 TABLET, FILM COATED ORAL at 21:31

## 2017-11-09 RX ADMIN — TAMSULOSIN HYDROCHLORIDE SCH MG: 0.4 CAPSULE ORAL at 21:32

## 2017-11-09 RX ADMIN — NYSTATIN SCH ML: 100000 SUSPENSION ORAL at 17:57

## 2017-11-09 RX ADMIN — VANCOMYCIN HYDROCHLORIDE SCH MG: 1 INJECTION, POWDER, LYOPHILIZED, FOR SOLUTION INTRAVENOUS at 12:33

## 2017-11-09 RX ADMIN — PANTOPRAZOLE SCH MG: 40 TABLET, DELAYED RELEASE ORAL at 08:06

## 2017-11-09 RX ADMIN — ATORVASTATIN CALCIUM SCH MG: 10 TABLET, FILM COATED ORAL at 08:07

## 2017-11-09 RX ADMIN — NYSTATIN SCH ML: 100000 SUSPENSION ORAL at 21:32

## 2017-11-09 RX ADMIN — WARFARIN SCH MG: 2 TABLET ORAL at 17:43

## 2017-11-09 RX ADMIN — Medication SCH ML: at 12:33

## 2017-11-09 RX ADMIN — Medication SCH ML: at 21:32

## 2017-11-09 RX ADMIN — VANCOMYCIN HYDROCHLORIDE SCH MG: 1 INJECTION, POWDER, LYOPHILIZED, FOR SOLUTION INTRAVENOUS at 21:32

## 2017-11-09 RX ADMIN — VANCOMYCIN HYDROCHLORIDE SCH MG: 1 INJECTION, POWDER, LYOPHILIZED, FOR SOLUTION INTRAVENOUS at 08:06

## 2017-11-09 RX ADMIN — HEPARIN SODIUM SCH UNIT: 10000 INJECTION, SOLUTION INTRAVENOUS; SUBCUTANEOUS at 08:08

## 2017-11-09 NOTE — PROGRESS NOTE
Medicine Progress Note


Date & Time of Visit:


Nov 9, 2017 at 14:17


.


Subjective





No fever.


No chest pain.


No cough or SOB.


No nausea or vomiting.


Still having loose stools.


Experiencing urinary frequency without dysuria.


.





Objective





Last 8 Hrs








  Date Time  Temp Pulse Resp B/P (MAP) Pulse Ox O2 Delivery O2 Flow Rate FiO2


 


11/9/17 08:00     94 Room Air  


 


11/9/17 07:54 36.7 61 18 145/80 (101) 94   








Physical Exam:





General- lying in bed, no distress


ENT- mild thrush on tongue


Neck- no JVD


Lungs- clear


Heart- irregular


Abdomen- + BS, soft, nontender 


Extremities- 1+ pretibial edema; right ankle casted; brace applied to left 

ankle 


Neuro- alert


.


Laboratory Results:





Last 24 Hours








Test


  11/9/17


08:18


 


White Blood Count 11.65 K/uL 


 


Red Blood Count 3.58 M/uL 


 


Hemoglobin 10.1 g/dL 


 


Hematocrit 32.1 % 


 


Mean Corpuscular Volume 89.7 fL 


 


Mean Corpuscular Hemoglobin 28.2 pg 


 


Mean Corpuscular Hemoglobin


Concent 31.5 g/dl 


 


 


RDW Standard Deviation 53.4 fL 


 


RDW Coefficient of Variation 16.3 % 


 


Platelet Count 215 K/uL 


 


Mean Platelet Volume 11.6 fL 


 


Prothrombin Time 18.2 SECONDS 


 


Prothromb Time International


Ratio 1.7 


 


 


Sodium Level 140 mmol/L 


 


Potassium Level 3.8 mmol/L 


 


Chloride Level 108 mmol/L 


 


Carbon Dioxide Level 25 mmol/L 


 


Anion Gap 7.0 mmol/L 


 


Blood Urea Nitrogen 15 mg/dl 


 


Creatinine 1.12 mg/dl 


 


Est Creatinine Clear Calc


Drug Dose 57.9 ml/min 


 


 


Estimated GFR (


American) 68.6 


 


 


Estimated GFR (Non-


American 59.2 


 


 


BUN/Creatinine Ratio 13.4 


 


Random Glucose 110 mg/dl 


 


Calcium Level 7.6 mg/dl 


 


Ionized Calcium 1.08 mmol/l 


 


Phosphorus Level 2.4 mg/dl 


 


Magnesium Level 2.2 mg/dl 











Assessment & Plan





SEPTIC SHOCK SECONDARY TO URINARY TRACT INFECTION (present on admission)


Presented with dysuria.


Initial BP 81/55.


No fever.


WBC 26,950.


Lactate 2.99.


Received IV fluids.


Blood and urine cultures obtained.


Started on broad spectrum antibiotic therapy with piperacillin / tazo + 

vancomycin.


Persistent hypotension after fluid bolus.


Received norepinephrine for pressor support.


Admitted to ICU.


Lactate and hemodynamics improved.


Blood cultures negative.


Urine culture grew E coli.


No evidence of urinary tract obstruction per US and CT.


Transferred out of ICU.


Transitioned to oral therapy with ciprofloxacin.





CLOSTRIDIUM DIFFICILE COLITIS (present on admission)


Patient had loose stool within 24 hrs of admission.


Stool 11/6 +  for C diff.


Receiving vancomycin.





ATRIAL FIBRILLATION


Continue amiodarone, warfarin.





CKD / NEGAR


CKD III with baseline creatinine 1.2-1.4 December 2016.


Serum creatinine 1.87 on admission and abdoul to 1.93.


Probable NEGAR secondary to sepsis.


Serum creatinine today = 1.12.


Follow.





HYPOKALEMIA


Replace.


Follow.





HYPOPHOSPHATEMIA


Replace.


Follow.





RIGHT ANKLE FRACTURE


Management per Ortho.





VTE PROPHYLAXIS


SQ heparin / warfarin.





DISPOSITION


Expected return to [facility] for skilled care.


Internal Medicine follow-up with Dr. Rucker.


Consultants:


ICU


Current Inpatient Medications:





Current Inpatient Medications








 Medications


  (Trade)  Dose


 Ordered  Sig/Deanne


 Route  Start Time


 Stop Time Status Last Admin


Dose Admin


 


 Acetaminophen


  (Tylenol Tab)  650 mg  Q4H  PRN


 PO  11/4/17 14:30


 12/4/17 14:29   


 


 


 Pantoprazole


 Sodium


  (Protonix Tab)  40 mg  DAILY


 PO  11/5/17 09:00


 12/5/17 08:59  11/9/17 08:06


40 MG


 


 Amiodarone HCl


  (Cordarone Tab)  200 mg  DAILY


 PO  11/5/17 09:00


 12/5/17 08:59  11/9/17 08:07


200 MG


 


 Aspirin


  (Ecotrin Tab)  81 mg  DAILY


 PO  11/5/17 09:00


 12/5/17 08:59  11/9/17 08:07


81 MG


 


 Atorvastatin


 Calcium


  (Lipitor Tab)  10 mg  DAILY


 PO  11/5/17 09:00


 12/5/17 08:59  11/9/17 08:07


10 MG


 


 Acetaminophen/


 Hydrocodone Bitart


  (Norco 5/325 Tab)  1 tab  Q6H  PRN


 PO  11/4/17 14:45


 11/18/17 14:44   


 


 


 Levothyroxine


 Sodium


  (Synthroid Tab)  100 mcg  DAILYBB


 PO  11/5/17 06:00


 12/5/17 06:59  11/9/17 06:19


100 MCG


 


 Polyethylene


  (Miralax Powder


 Packet)  17 gm  DAILY  PRN


 PO  11/4/17 14:45


 12/4/17 14:44   


 


 


 Tamsulosin HCl


  (Flomax Cap)  0.4 mg  HS


 PO  11/4/17 21:00


 12/4/17 20:59  11/8/17 21:08


0.4 MG


 


 Heparin Sodium


  (Porcine)


  (Heparin Sq 5000


 Unit/0.5ml)  5,000 unit  Q12


 SQ  11/4/17 21:00


 12/4/17 20:59  11/9/17 08:08


5,000 UNIT


 


 Miscellaneous


 Information


  (Pending Order)  1 ea  DAILY@10


 N/A  11/5/17 10:00


 12/5/17 09:59   


 


 


 Vancomycin HCl


  (Vancomycin Oral


 Soln)  125 mg  QID


 PO  11/6/17 13:00


 11/20/17 12:59  11/9/17 12:33


125 MG


 


 Raspberry


  (Raspberry Syrup


 5ml Cup)  5 ml  QID


 PO  11/6/17 13:00


 11/20/17 12:59  11/9/17 12:33


5 ML


 


 Warfarin Sodium


  (Coumadin Tab)  2 mg  DAILY@16


 PO  11/7/17 16:00


 12/7/17 15:59  11/8/17 16:06


2 MG


 


 Ciprofloxacin


  (Cipro Tab)  500 mg  BID


 PO  11/7/17 11:00


 11/12/17 10:59  11/9/17 08:07


500 MG


 


 Albuterol/


 Ipratropium


  (Duoneb)  3 ml  Q4H  PRN


 INH  11/9/17 06:00


 12/9/17 05:59

## 2017-11-09 NOTE — PROGRESS NOTE
Medicine Progress Note


Date & Time of Visit:


Nov 8, 2017 at 11:33.


Subjective


-reports some weakness


-persistent diarrhea


-no abdominal pain


-denies issues urinating





Objective





Last 8 Hrs








  Date Time  Temp Pulse Resp B/P (MAP) Pulse Ox O2 Delivery O2 Flow Rate FiO2


 


11/8/17 08:30      Room Air  


 


11/8/17 07:21 36.5 60 16 156/74 (101) 97 Room Air  








Physical Exam:


GEN: WNWD, in no acute distress, alert and appropriate, lying supine in bed, 

struggles to sit up without assistance. Deconditioned.  


HEENT: NC/AT, normal sclerae, mucous membranes moist


CARDIO: reg rate, S1/2 heard without m/g/r


LUNGS: CTA bilaterally, no crackles, rales or wheezes, good diaphragmatic 

excursion


ABD: soft, non-tender, non-distended, no rebound or guarding, +BS, no CVA 

tenderness appreciated. 


EXTREMITY: RP and DP palpable 2+ bilat, trace swelling bilaterally, hard cast 

to L lower leg, toes warm and well perfused bilaterally


N/M:  alert and appropriate, +bilateral hand tremors noted. Able to moves 

extremities equally.  Deconditioned. 


SKIN:  warm and dry


Laboratory Results:





Last 24 Hours








Test


  11/8/17


06:39


 


White Blood Count 10.08 K/uL 


 


Red Blood Count 3.60 M/uL 


 


Hemoglobin 9.8 g/dL 


 


Hematocrit 32.3 % 


 


Mean Corpuscular Volume 89.7 fL 


 


Mean Corpuscular Hemoglobin 27.2 pg 


 


Mean Corpuscular Hemoglobin


Concent 30.3 g/dl 


 


 


Platelet Count 209 K/uL 


 


Mean Platelet Volume 11.5 fL 


 


Neutrophils (%) (Auto) 77.9 % 


 


Lymphocytes (%) (Auto) 9.7 % 


 


Monocytes (%) (Auto) 5.6 % 


 


Eosinophils (%) (Auto) 1.7 % 


 


Basophils (%) (Auto) 0.4 % 


 


Neutrophils # (Auto) 7.86 K/uL 


 


Lymphocytes # (Auto) 0.98 K/uL 


 


Monocytes # (Auto) 0.56 K/uL 


 


Eosinophils # (Auto) 0.17 K/uL 


 


Basophils # (Auto) 0.04 K/uL 


 


RDW Standard Deviation 54.3 fL 


 


RDW Coefficient of Variation 16.4 % 


 


Immature Granulocyte % (Auto) 4.7 % 


 


Immature Granulocyte # (Auto) 0.47 K/uL 


 


Prothrombin Time 15.6 SECONDS 


 


Prothromb Time International


Ratio 1.4 


 


 


Sodium Level 141 mmol/L 


 


Potassium Level 3.2 mmol/L 


 


Chloride Level 105 mmol/L 


 


Carbon Dioxide Level 29 mmol/L 


 


Anion Gap 7.0 mmol/L 


 


Blood Urea Nitrogen 19 mg/dl 


 


Creatinine 1.12 mg/dl 


 


Est Creatinine Clear Calc


Drug Dose 57.9 ml/min 


 


 


Estimated GFR (


American) 68.6 


 


 


Estimated GFR (Non-


American 59.2 


 


 


BUN/Creatinine Ratio 17.3 


 


Random Glucose 75 mg/dl 


 


Calcium Level 7.4 mg/dl 


 


Phosphorus Level 1.8 mg/dl 


 


Magnesium Level 2.4 mg/dl 











Assessment & Plan


87 yo M with septic shock likely 2/2 pyelo.  Resuscitated in ICU, required 

norepi for two days.  Transferred to floor, antibiotics changed to Rocephin as 

culture results returned, then switched to Cipro.  Pt has prior ORIF from 

admission one month ago and is NWB; he continues to have significant weakness.  

While in the ICU he was diagnosed with c-diff and was placed on vancomycin.  He 

continues to have diarrhea. 





1.  Sepsis 2/2 E coli-resuscitated and off Levophed.  Leg is in cast but 

patient denies any pain.  Vanc and Zosyn switched to Rocephin IV as pt not 

quite tolerating PO switch to Cipro PO Blood cultures negative to date. 

Continues to improve clinically.


2.  c-diff diarrhea-cont Vanomycin.  


3.  Carotid disease-cont ASA 81


4.  PAF-on coumadin and subtherapeutic.  Cont coumadin and amiodarone.  No AV 

mary blocking agents and rate is controlled. 


5.  Tremors s/p deep brain stimulators-these are currently turned off.  


6.  NEGAR in setting CKD Stage III-resolved.  Baseline creat 1.3-1.6.  


7.  Hypothyroidism-cont Synthroid at home dose  


8.  Hypokalemia-replace


9.  Hypophosphatemia-replace





DVT proph-coumadin and heparin while subtherapeutic


Full Code per discussion with patient, wife and daughter on admission


Dispo-transfer to telemetry.  Push early ambulation as tolerated.  PT/OT.  





DO Silvano Segovia Hospitalist


Consultants:


ICU


Current Inpatient Medications:





Current Inpatient Medications








 Medications


  (Trade)  Dose


 Ordered  Sig/Deanne


 Route  Start Time


 Stop Time Status Last Admin


Dose Admin


 


 Acetaminophen


  (Tylenol Tab)  650 mg  Q4H  PRN


 PO  11/4/17 14:30


 12/4/17 14:29   


 


 


 Pantoprazole


 Sodium


  (Protonix Tab)  40 mg  DAILY


 PO  11/5/17 09:00


 12/5/17 08:59  11/8/17 07:39


40 MG


 


 Amiodarone HCl


  (Cordarone Tab)  200 mg  DAILY


 PO  11/5/17 09:00


 12/5/17 08:59  11/8/17 07:40


200 MG


 


 Aspirin


  (Ecotrin Tab)  81 mg  DAILY


 PO  11/5/17 09:00


 12/5/17 08:59  11/8/17 07:39


81 MG


 


 Atorvastatin


 Calcium


  (Lipitor Tab)  10 mg  DAILY


 PO  11/5/17 09:00


 12/5/17 08:59  11/8/17 07:39


10 MG


 


 Docusate Sodium


  (coLACE CAP)  100 mg  BID


 PO  11/4/17 21:00


 12/4/17 20:59   


 


 


 Acetaminophen/


 Hydrocodone Bitart


  (Norco 5/325 Tab)  1 tab  Q6H  PRN


 PO  11/4/17 14:45


 11/18/17 14:44   


 


 


 Levothyroxine


 Sodium


  (Synthroid Tab)  100 mcg  DAILYBB


 PO  11/5/17 06:00


 12/5/17 06:59  11/8/17 04:33


100 MCG


 


 Polyethylene


  (Miralax Powder


 Packet)  17 gm  DAILY  PRN


 PO  11/4/17 14:45


 12/4/17 14:44   


 


 


 Tamsulosin HCl


  (Flomax Cap)  0.4 mg  HS


 PO  11/4/17 21:00


 12/4/17 20:59  11/7/17 20:11


0.4 MG


 


 Heparin Sodium


  (Porcine)


  (Heparin Sq 5000


 Unit/0.5ml)  5,000 unit  Q12


 SQ  11/4/17 21:00


 12/4/17 20:59  11/8/17 07:44


5,000 UNIT


 


 Miscellaneous


 Information


  (Pending Order)  1 ea  DAILY@10


 N/A  11/5/17 10:00


 12/5/17 09:59   


 


 


 Parenteral


 Electrolyte


 Solution  1,000 ml @ 


 125 mls/hr  Q8H


 IV  11/4/17 19:00


 12/4/17 18:59  11/8/17 03:42


125 MLS/HR


 


 Vancomycin HCl


  (Vancomycin Oral


 Soln)  125 mg  QID


 PO  11/6/17 13:00


 11/20/17 12:59  11/8/17 07:39


125 MG


 


 Raspberry


  (Raspberry Syrup


 5ml Cup)  5 ml  QID


 PO  11/6/17 13:00


 11/20/17 12:59  11/8/17 07:39


5 ML


 


 Warfarin Sodium


  (Coumadin Tab)  2 mg  DAILY@16


 PO  11/7/17 16:00


 12/7/17 15:59  11/7/17 16:32


2 MG


 


 Ciprofloxacin


  (Cipro Tab)  500 mg  BID


 PO  11/7/17 11:00


 11/12/17 10:59  11/8/17 07:39


500 MG


 


 Potassium


 Chloride 10 meq/


 Prmx  100 ml @ 


 100 mls/hr  Q1H


 IV  11/8/17 10:00


 11/8/17 11:59  11/8/17 10:08


100 MLS/HR


 


 Sodium Phosphate


 30 mmol/Sodium


 Chloride  510 ml @ 


 88 mls/hr  ONE  ONCE


 IV  11/8/17 10:00


 11/8/17 15:47  11/8/17 10:30


88 MLS/HR

## 2017-11-10 VITALS — TEMPERATURE: 97.52 F | HEART RATE: 64 BPM | SYSTOLIC BLOOD PRESSURE: 153 MMHG | DIASTOLIC BLOOD PRESSURE: 78 MMHG

## 2017-11-10 VITALS — OXYGEN SATURATION: 94 % | HEART RATE: 62 BPM | DIASTOLIC BLOOD PRESSURE: 62 MMHG | SYSTOLIC BLOOD PRESSURE: 148 MMHG

## 2017-11-10 VITALS — OXYGEN SATURATION: 92 % | TEMPERATURE: 97.34 F

## 2017-11-10 LAB
ANION GAP SERPL CALC-SCNC: 7 MMOL/L (ref 3–11)
BUN SERPL-MCNC: 12 MG/DL (ref 7–18)
BUN/CREAT SERPL: 11.4 (ref 10–20)
CALCIUM SERPL-MCNC: 8.3 MG/DL (ref 8.5–10.1)
CHLORIDE SERPL-SCNC: 105 MMOL/L (ref 98–107)
CO2 SERPL-SCNC: 27 MMOL/L (ref 21–32)
CREAT CL PREDICTED SERPL C-G-VRATE: 60.1 ML/MIN
CREAT SERPL-MCNC: 1.08 MG/DL (ref 0.6–1.4)
EOSINOPHIL NFR BLD AUTO: 248 K/UL (ref 130–400)
GLUCOSE SERPL-MCNC: 88 MG/DL (ref 70–99)
HCT VFR BLD CALC: 33.8 % (ref 42–52)
INR PPP: 1.9 (ref 0.9–1.1)
MAGNESIUM SERPL-MCNC: 2.1 MG/DL (ref 1.8–2.4)
MCH RBC QN AUTO: 27.7 PG (ref 25–34)
MCHC RBC AUTO-ENTMCNC: 30.8 G/DL (ref 32–36)
MCV RBC AUTO: 89.9 FL (ref 80–100)
NRBC BLD AUTO-RTO: 0.2 %
PHOSPHATE SERPL-MCNC: 2.5 MG/DL (ref 2.5–4.9)
PMV BLD AUTO: 11.5 FL (ref 7.4–10.4)
POTASSIUM SERPL-SCNC: 4.1 MMOL/L (ref 3.5–5.1)
PROTHROMBIN TIME: 20.6 SECONDS (ref 9–12)
RBC # BLD AUTO: 3.76 M/UL (ref 4.7–6.1)
SODIUM SERPL-SCNC: 139 MMOL/L (ref 136–145)
WBC # BLD AUTO: 11.22 K/UL (ref 4.8–10.8)

## 2017-11-10 RX ADMIN — NYSTATIN SCH ML: 100000 SUSPENSION ORAL at 07:41

## 2017-11-10 RX ADMIN — HEPARIN SODIUM SCH UNIT: 10000 INJECTION, SOLUTION INTRAVENOUS; SUBCUTANEOUS at 08:00

## 2017-11-10 RX ADMIN — Medication SCH MG: at 07:43

## 2017-11-10 RX ADMIN — VANCOMYCIN HYDROCHLORIDE SCH MG: 1 INJECTION, POWDER, LYOPHILIZED, FOR SOLUTION INTRAVENOUS at 16:19

## 2017-11-10 RX ADMIN — PANTOPRAZOLE SCH MG: 40 TABLET, DELAYED RELEASE ORAL at 07:43

## 2017-11-10 RX ADMIN — CIPROFLOXACIN SCH MG: 500 TABLET, FILM COATED ORAL at 20:29

## 2017-11-10 RX ADMIN — NYSTATIN SCH ML: 100000 SUSPENSION ORAL at 20:28

## 2017-11-10 RX ADMIN — NYSTATIN SCH ML: 100000 SUSPENSION ORAL at 16:48

## 2017-11-10 RX ADMIN — VANCOMYCIN HYDROCHLORIDE SCH MG: 1 INJECTION, POWDER, LYOPHILIZED, FOR SOLUTION INTRAVENOUS at 20:28

## 2017-11-10 RX ADMIN — VANCOMYCIN HYDROCHLORIDE SCH MG: 1 INJECTION, POWDER, LYOPHILIZED, FOR SOLUTION INTRAVENOUS at 14:38

## 2017-11-10 RX ADMIN — CIPROFLOXACIN SCH MG: 500 TABLET, FILM COATED ORAL at 07:43

## 2017-11-10 RX ADMIN — LEVOTHYROXINE SODIUM SCH MCG: 100 TABLET ORAL at 05:22

## 2017-11-10 RX ADMIN — Medication SCH ML: at 20:28

## 2017-11-10 RX ADMIN — Medication SCH ML: at 16:19

## 2017-11-10 RX ADMIN — Medication SCH ML: at 07:41

## 2017-11-10 RX ADMIN — WARFARIN SCH MG: 2 TABLET ORAL at 16:19

## 2017-11-10 RX ADMIN — Medication SCH ML: at 14:38

## 2017-11-10 RX ADMIN — VANCOMYCIN HYDROCHLORIDE SCH MG: 1 INJECTION, POWDER, LYOPHILIZED, FOR SOLUTION INTRAVENOUS at 07:41

## 2017-11-10 RX ADMIN — TAMSULOSIN HYDROCHLORIDE SCH MG: 0.4 CAPSULE ORAL at 20:29

## 2017-11-10 RX ADMIN — ATORVASTATIN CALCIUM SCH MG: 10 TABLET, FILM COATED ORAL at 07:43

## 2017-11-10 RX ADMIN — AMIODARONE HYDROCHLORIDE SCH MG: 200 TABLET ORAL at 07:44

## 2017-11-10 RX ADMIN — NYSTATIN SCH ML: 100000 SUSPENSION ORAL at 14:38

## 2017-11-10 RX ADMIN — HEPARIN SODIUM SCH UNIT: 10000 INJECTION, SOLUTION INTRAVENOUS; SUBCUTANEOUS at 20:32

## 2017-11-10 NOTE — PROGRESS NOTE
Medicine Progress Note


Date & Time of Visit:


Nov 10, 2017 at 12:40


.


Subjective





No fever.


Still having loose stools with incontinence.


Still having urinary frequency.


No chest pain.


No cough or SOB.


No nausea, vomiting.


.





Objective





Last 8 Hrs








  Date Time  Temp Pulse Resp B/P (MAP) Pulse Ox O2 Delivery O2 Flow Rate FiO2


 


11/10/17 16:00      Room Air  


 


11/10/17 15:35 36.4 64 20 153/78 (103)    








Physical Exam:





General- lying in bed, no distress


ENT- 


Neck- no JVD


Lungs- clear


Heart- irregular


Abdomen- + BS, soft, nontender 


Extremities- 1+ pretibial edema; right ankle casted; brace applied to left 

ankle 


Neuro- alert


.


Laboratory Results:





Last 24 Hours








Test


  11/10/17


07:06


 


White Blood Count 11.22 K/uL 


 


Red Blood Count 3.76 M/uL 


 


Hemoglobin 10.4 g/dL 


 


Hematocrit 33.8 % 


 


Mean Corpuscular Volume 89.9 fL 


 


Mean Corpuscular Hemoglobin 27.7 pg 


 


Mean Corpuscular Hemoglobin


Concent 30.8 g/dl 


 


 


RDW Standard Deviation 53.3 fL 


 


RDW Coefficient of Variation 16.5 % 


 


Platelet Count 248 K/uL 


 


Mean Platelet Volume 11.5 fL 


 


Nucleated RBC Absolute Count


(auto) 0.02 K/uL 


 


 


Nucleated Red Blood Cells % 0.2 % 


 


Prothrombin Time 20.6 SECONDS 


 


Prothromb Time International


Ratio 1.9 


 


 


Sodium Level 139 mmol/L 


 


Potassium Level 4.1 mmol/L 


 


Chloride Level 105 mmol/L 


 


Carbon Dioxide Level 27 mmol/L 


 


Anion Gap 7.0 mmol/L 


 


Blood Urea Nitrogen 12 mg/dl 


 


Creatinine 1.08 mg/dl 


 


Est Creatinine Clear Calc


Drug Dose 60.1 ml/min 


 


 


Estimated GFR (


American) 71.6 


 


 


Estimated GFR (Non-


American 61.8 


 


 


BUN/Creatinine Ratio 11.4 


 


Random Glucose 88 mg/dl 


 


Calcium Level 8.3 mg/dl 


 


Phosphorus Level 2.5 mg/dl 


 


Magnesium Level 2.1 mg/dl 











Assessment & Plan





SEPTIC SHOCK SECONDARY TO URINARY TRACT INFECTION (present on admission)


Presented with dysuria.


Initial BP 81/55.


No fever.


WBC 26,950.


Lactate 2.99.


Received IV fluids.


Blood and urine cultures obtained.


Started on broad spectrum antibiotic therapy with piperacillin / tazo + 

vancomycin.


Persistent hypotension after fluid bolus.


Received norepinephrine for pressor support.


Admitted to ICU.


Lactate and hemodynamics improved.


Blood cultures negative.


Urine culture grew E coli.


No evidence of urinary tract obstruction per US and CT.


Transferred out of ICU.


Transitioned to oral therapy with ciprofloxacin.





CLOSTRIDIUM DIFFICILE COLITIS (present on admission)


Patient had loose stool within 24 hrs of admission.


Stool 11/6 +  for C diff.


Persistent loose stools.


Continue vancomycin.





ATRIAL FIBRILLATION


Continue amiodarone, warfarin.





CKD / NEGAR


CKD III with baseline creatinine 1.2-1.4 December 2016.


Serum creatinine 1.87 on admission and abdoul to 1.93.


Probable NEGAR secondary to sepsis.


Serum creatinine today = 1.08.


Follow.





HYPOKALEMIA


Potassium as low as 3.0.


Replaced.


K today = 4.1.


Follow.





HYPOPHOSPHATEMIA


Phosphorus as low as 3.1.2


Replaced.


Phos today = 2.5.


Follow.





RIGHT ANKLE FRACTURE


Management per Ortho.





VTE PROPHYLAXIS


SQ heparin / warfarin.





DISPOSITION


Expected return to [facility] for skilled care.


Internal Medicine follow-up with Dr. Rucker.


.


Consultants:


ICU


Current Inpatient Medications:





Current Inpatient Medications








 Medications


  (Trade)  Dose


 Ordered  Sig/Deanne


 Route  Start Time


 Stop Time Status Last Admin


Dose Admin


 


 Acetaminophen


  (Tylenol Tab)  650 mg  Q4H  PRN


 PO  11/4/17 14:30


 12/4/17 14:29   


 


 


 Pantoprazole


 Sodium


  (Protonix Tab)  40 mg  DAILY


 PO  11/5/17 09:00


 12/5/17 08:59  11/10/17 07:43


40 MG


 


 Amiodarone HCl


  (Cordarone Tab)  200 mg  DAILY


 PO  11/5/17 09:00


 12/5/17 08:59  11/10/17 07:44


200 MG


 


 Aspirin


  (Ecotrin Tab)  81 mg  DAILY


 PO  11/5/17 09:00


 12/5/17 08:59  11/10/17 07:43


81 MG


 


 Atorvastatin


 Calcium


  (Lipitor Tab)  10 mg  DAILY


 PO  11/5/17 09:00


 12/5/17 08:59  11/10/17 07:43


10 MG


 


 Acetaminophen/


 Hydrocodone Bitart


  (Norco 5/325 Tab)  1 tab  Q6H  PRN


 PO  11/4/17 14:45


 11/18/17 14:44   


 


 


 Levothyroxine


 Sodium


  (Synthroid Tab)  100 mcg  DAILYBB


 PO  11/5/17 06:00


 12/5/17 06:59  11/10/17 05:22


100 MCG


 


 Polyethylene


  (Miralax Powder


 Packet)  17 gm  DAILY  PRN


 PO  11/4/17 14:45


 12/4/17 14:44   


 


 


 Tamsulosin HCl


  (Flomax Cap)  0.4 mg  HS


 PO  11/4/17 21:00


 12/4/17 20:59  11/9/17 21:32


0.4 MG


 


 Heparin Sodium


  (Porcine)


  (Heparin Sq 5000


 Unit/0.5ml)  5,000 unit  Q12


 SQ  11/4/17 21:00


 12/4/17 20:59  11/10/17 08:00


5,000 UNIT


 


 Miscellaneous


 Information


  (Pending Order)  1 ea  DAILY@10


 N/A  11/5/17 10:00


 12/5/17 09:59  11/10/17 10:00


1 EA


 


 Vancomycin HCl


  (Vancomycin Oral


 Soln)  125 mg  QID


 PO  11/6/17 13:00


 11/20/17 12:59  11/10/17 16:19


125 MG


 


 Raspberry


  (Raspberry Syrup


 5ml Cup)  5 ml  QID


 PO  11/6/17 13:00


 11/20/17 12:59  11/10/17 16:19


5 ML


 


 Warfarin Sodium


  (Coumadin Tab)  2 mg  DAILY@16


 PO  11/7/17 16:00


 12/7/17 15:59  11/10/17 16:19


2 MG


 


 Ciprofloxacin


  (Cipro Tab)  500 mg  BID


 PO  11/7/17 11:00


 11/12/17 10:59  11/10/17 07:43


500 MG


 


 Albuterol/


 Ipratropium


  (Duoneb)  3 ml  Q4H  PRN


 INH  11/9/17 06:00


 12/9/17 05:59   


 


 


 Nystatin


  (Mycostatin Susp)  5 ml  QID


 PO  11/9/17 17:00


 11/19/17 16:59  11/10/17 16:48


5 ML

## 2017-11-11 VITALS
TEMPERATURE: 97.7 F | SYSTOLIC BLOOD PRESSURE: 148 MMHG | DIASTOLIC BLOOD PRESSURE: 74 MMHG | OXYGEN SATURATION: 96 % | HEART RATE: 106 BPM

## 2017-11-11 VITALS
HEART RATE: 65 BPM | DIASTOLIC BLOOD PRESSURE: 96 MMHG | SYSTOLIC BLOOD PRESSURE: 180 MMHG | OXYGEN SATURATION: 95 % | TEMPERATURE: 97.52 F

## 2017-11-11 VITALS — OXYGEN SATURATION: 92 %

## 2017-11-11 VITALS
SYSTOLIC BLOOD PRESSURE: 184 MMHG | DIASTOLIC BLOOD PRESSURE: 89 MMHG | OXYGEN SATURATION: 95 % | TEMPERATURE: 97.7 F | HEART RATE: 114 BPM

## 2017-11-11 LAB
INR PPP: 2.1 (ref 0.9–1.1)
PROTHROMBIN TIME: 23 SECONDS (ref 9–12)

## 2017-11-11 RX ADMIN — VANCOMYCIN HYDROCHLORIDE SCH MG: 1 INJECTION, POWDER, LYOPHILIZED, FOR SOLUTION INTRAVENOUS at 08:10

## 2017-11-11 RX ADMIN — Medication SCH ML: at 16:40

## 2017-11-11 RX ADMIN — LEVOTHYROXINE SODIUM SCH MCG: 100 TABLET ORAL at 06:16

## 2017-11-11 RX ADMIN — VANCOMYCIN HYDROCHLORIDE SCH MG: 1 INJECTION, POWDER, LYOPHILIZED, FOR SOLUTION INTRAVENOUS at 12:28

## 2017-11-11 RX ADMIN — Medication SCH MG: at 08:10

## 2017-11-11 RX ADMIN — NYSTATIN SCH ML: 100000 SUSPENSION ORAL at 21:00

## 2017-11-11 RX ADMIN — Medication SCH ML: at 12:28

## 2017-11-11 RX ADMIN — PANTOPRAZOLE SCH MG: 40 TABLET, DELAYED RELEASE ORAL at 08:11

## 2017-11-11 RX ADMIN — NYSTATIN SCH ML: 100000 SUSPENSION ORAL at 16:39

## 2017-11-11 RX ADMIN — ATORVASTATIN CALCIUM SCH MG: 10 TABLET, FILM COATED ORAL at 08:11

## 2017-11-11 RX ADMIN — CIPROFLOXACIN SCH MG: 500 TABLET, FILM COATED ORAL at 08:11

## 2017-11-11 RX ADMIN — VANCOMYCIN HYDROCHLORIDE SCH MG: 1 INJECTION, POWDER, LYOPHILIZED, FOR SOLUTION INTRAVENOUS at 21:00

## 2017-11-11 RX ADMIN — Medication SCH ML: at 08:10

## 2017-11-11 RX ADMIN — TAMSULOSIN HYDROCHLORIDE SCH MG: 0.4 CAPSULE ORAL at 21:00

## 2017-11-11 RX ADMIN — VANCOMYCIN HYDROCHLORIDE SCH MG: 1 INJECTION, POWDER, LYOPHILIZED, FOR SOLUTION INTRAVENOUS at 16:40

## 2017-11-11 RX ADMIN — Medication SCH ML: at 21:00

## 2017-11-11 RX ADMIN — NYSTATIN SCH ML: 100000 SUSPENSION ORAL at 12:28

## 2017-11-11 RX ADMIN — CIPROFLOXACIN SCH MG: 500 TABLET, FILM COATED ORAL at 23:20

## 2017-11-11 RX ADMIN — WARFARIN SCH MG: 2 TABLET ORAL at 15:41

## 2017-11-11 RX ADMIN — NYSTATIN SCH ML: 100000 SUSPENSION ORAL at 08:10

## 2017-11-11 RX ADMIN — AMIODARONE HYDROCHLORIDE SCH MG: 200 TABLET ORAL at 08:11

## 2017-11-11 NOTE — PROGRESS NOTE
Medicine Progress Note


Date & Time of Visit:


Nov 11, 2017 at 12:00


.


Subjective


No fever.


Diarrhea improved.


Still having urinary frequency, but voided volume improved.


No chest pain.


No cough or SOB.


No nausea, vomiting.


Wife visiting.


.





Objective





Last 8 Hrs








  Date Time  Temp Pulse Resp B/P (MAP) Pulse Ox O2 Delivery O2 Flow Rate FiO2


 


11/11/17 16:00      Room Air  


 


11/11/17 15:09 36.5 106 18 148/74 (98) 96 Room Air  








Physical Exam:





General- lying in bed, no distress


Neck- no JVD


Lungs- clear


Heart- irregular


Abdomen- + BS, soft, nontender 


Extremities- 1+ pretibial edema; right ankle casted; capillary refill right 

toes less than 2 seconds; brace applied to left ankle 


Neuro- alert


.


Laboratory Results:





Last 24 Hours








Test


  11/11/17


07:26


 


Prothrombin Time 23.0 SECONDS 


 


Prothromb Time International


Ratio 2.1 


 











Assessment & Plan





SEPTIC SHOCK SECONDARY TO URINARY TRACT INFECTION (present on admission)


Presented with dysuria.


Initial BP 81/55.


No fever.


WBC 26,950.


Lactate 2.99.


Received IV fluids.


Blood and urine cultures obtained.


Started on broad spectrum antibiotic therapy with piperacillin / tazo + 

vancomycin.


Persistent hypotension after fluid bolus.


Received norepinephrine for pressor support.


Admitted to ICU.


Lactate and hemodynamics improved.


Blood cultures negative.


Urine culture grew E coli.


No evidence of urinary tract obstruction per US and CT.


Transferred out of ICU.


Transitioned to oral therapy with ciprofloxacin; continue for 14 day course in 

light of possible prostatitis.





CLOSTRIDIUM DIFFICILE COLITIS (present on admission)


Patient had loose stool within 24 hrs of admission.


Stool 11/6 +  for C diff.


Persistent loose stools.


Continue vancomycin.





ATRIAL FIBRILLATION


Continue amiodarone, warfarin.





CKD / NEGAR


CKD III with baseline creatinine 1.2-1.4 December 2016.


Serum creatinine 1.87 on admission and abdoul to 1.93.


Probable NEGAR secondary to sepsis.


Serum creatinine 11/10 was 1.08.


Follow.





HYPOKALEMIA


Potassium as low as 3.0.


Replaced.


K 11/10 was 4.1.


Follow.





HYPOPHOSPHATEMIA


Phosphorus as low as 3.1.2


Replaced.


Phos 11/10 was 2.5.


Follow.





URINARY FREQUENCY


Difficult insertion of Johnson catheter in ED at time of presentation; patient 

hopes to avoid further catheterizations.  


Reported allergy to finasteride.


Continue tamsulosin for BPH.


Continue antibiotics for possible prostatitis.


Check postvoid residual by bedside ultrasound.





RIGHT ANKLE FRACTURE


Management per Ortho.





VTE PROPHYLAXIS


SQ heparin / warfarin.





DISPOSITION


Expected return to [facility] for [facility] care.


Internal Medicine follow-up with Dr. Rucker.


.


Consultants:


ICU


Current Inpatient Medications:





Current Inpatient Medications








 Medications


  (Trade)  Dose


 Ordered  Sig/Deanne


 Route  Start Time


 Stop Time Status Last Admin


Dose Admin


 


 Acetaminophen


  (Tylenol Tab)  650 mg  Q4H  PRN


 PO  11/4/17 14:30


 12/4/17 14:29   


 


 


 Pantoprazole


 Sodium


  (Protonix Tab)  40 mg  DAILY


 PO  11/5/17 09:00


 12/5/17 08:59  11/11/17 08:11


40 MG


 


 Amiodarone HCl


  (Cordarone Tab)  200 mg  DAILY


 PO  11/5/17 09:00


 12/5/17 08:59  11/11/17 08:11


200 MG


 


 Aspirin


  (Ecotrin Tab)  81 mg  DAILY


 PO  11/5/17 09:00


 12/5/17 08:59  11/11/17 08:10


81 MG


 


 Atorvastatin


 Calcium


  (Lipitor Tab)  10 mg  DAILY


 PO  11/5/17 09:00


 12/5/17 08:59  11/11/17 08:11


10 MG


 


 Acetaminophen/


 Hydrocodone Bitart


  (Norco 5/325 Tab)  1 tab  Q6H  PRN


 PO  11/4/17 14:45


 11/18/17 14:44   


 


 


 Levothyroxine


 Sodium


  (Synthroid Tab)  100 mcg  DAILYBB


 PO  11/5/17 06:00


 12/5/17 06:59  11/11/17 06:16


100 MCG


 


 Polyethylene


  (Miralax Powder


 Packet)  17 gm  DAILY  PRN


 PO  11/4/17 14:45


 12/4/17 14:44   


 


 


 Tamsulosin HCl


  (Flomax Cap)  0.4 mg  HS


 PO  11/4/17 21:00


 12/4/17 20:59  11/10/17 20:29


0.4 MG


 


 Miscellaneous


 Information


  (Pending Order)  1 ea  DAILY@10


 N/A  11/5/17 10:00


 12/5/17 09:59  11/10/17 10:00


1 EA


 


 Vancomycin HCl


  (Vancomycin Oral


 Soln)  125 mg  QID


 PO  11/6/17 13:00


 11/20/17 12:59  11/11/17 16:40


125 MG


 


 Raspberry


  (Raspberry Syrup


 5ml Cup)  5 ml  QID


 PO  11/6/17 13:00


 11/20/17 12:59  11/11/17 16:40


5 ML


 


 Warfarin Sodium


  (Coumadin Tab)  2 mg  DAILY@16


 PO  11/7/17 16:00


 12/7/17 15:59  11/11/17 15:41


2 MG


 


 Ciprofloxacin


  (Cipro Tab)  500 mg  BID


 PO  11/7/17 11:00


 11/12/17 10:59  11/11/17 08:11


500 MG


 


 Albuterol/


 Ipratropium


  (Duoneb)  3 ml  Q4H  PRN


 INH  11/9/17 06:00


 12/9/17 05:59   


 


 


 Nystatin


  (Mycostatin Susp)  5 ml  QID


 PO  11/9/17 17:00


 11/19/17 16:59  11/11/17 16:39


5 ML

## 2017-11-12 VITALS
HEART RATE: 54 BPM | DIASTOLIC BLOOD PRESSURE: 74 MMHG | SYSTOLIC BLOOD PRESSURE: 124 MMHG | TEMPERATURE: 97.52 F | OXYGEN SATURATION: 95 %

## 2017-11-12 VITALS
HEART RATE: 108 BPM | SYSTOLIC BLOOD PRESSURE: 166 MMHG | TEMPERATURE: 97.7 F | DIASTOLIC BLOOD PRESSURE: 99 MMHG | OXYGEN SATURATION: 94 %

## 2017-11-12 VITALS — OXYGEN SATURATION: 92 %

## 2017-11-12 VITALS
OXYGEN SATURATION: 95 % | DIASTOLIC BLOOD PRESSURE: 62 MMHG | TEMPERATURE: 97.52 F | HEART RATE: 101 BPM | SYSTOLIC BLOOD PRESSURE: 132 MMHG

## 2017-11-12 VITALS
TEMPERATURE: 97.88 F | SYSTOLIC BLOOD PRESSURE: 133 MMHG | DIASTOLIC BLOOD PRESSURE: 79 MMHG | HEART RATE: 62 BPM | OXYGEN SATURATION: 93 %

## 2017-11-12 LAB
ANION GAP SERPL CALC-SCNC: 3 MMOL/L (ref 3–11)
BUN SERPL-MCNC: 13 MG/DL (ref 7–18)
BUN/CREAT SERPL: 9.9 (ref 10–20)
CALCIUM SERPL-MCNC: 8.4 MG/DL (ref 8.5–10.1)
CHLORIDE SERPL-SCNC: 103 MMOL/L (ref 98–107)
CO2 SERPL-SCNC: 31 MMOL/L (ref 21–32)
CREAT CL PREDICTED SERPL C-G-VRATE: 51.5 ML/MIN
CREAT SERPL-MCNC: 1.26 MG/DL (ref 0.6–1.4)
EOSINOPHIL NFR BLD AUTO: 264 K/UL (ref 130–400)
GLUCOSE SERPL-MCNC: 86 MG/DL (ref 70–99)
HCT VFR BLD CALC: 36.1 % (ref 42–52)
INR PPP: 2.2 (ref 0.9–1.1)
MCH RBC QN AUTO: 27.8 PG (ref 25–34)
MCHC RBC AUTO-ENTMCNC: 30.7 G/DL (ref 32–36)
MCV RBC AUTO: 90.5 FL (ref 80–100)
PMV BLD AUTO: 11 FL (ref 7.4–10.4)
POTASSIUM SERPL-SCNC: 4.1 MMOL/L (ref 3.5–5.1)
PROTHROMBIN TIME: 24.8 SECONDS (ref 9–12)
RBC # BLD AUTO: 3.99 M/UL (ref 4.7–6.1)
SODIUM SERPL-SCNC: 137 MMOL/L (ref 136–145)
WBC # BLD AUTO: 10.55 K/UL (ref 4.8–10.8)

## 2017-11-12 RX ADMIN — VANCOMYCIN HYDROCHLORIDE SCH MG: 1 INJECTION, POWDER, LYOPHILIZED, FOR SOLUTION INTRAVENOUS at 12:47

## 2017-11-12 RX ADMIN — TAMSULOSIN HYDROCHLORIDE SCH MG: 0.4 CAPSULE ORAL at 20:19

## 2017-11-12 RX ADMIN — VANCOMYCIN HYDROCHLORIDE SCH MG: 1 INJECTION, POWDER, LYOPHILIZED, FOR SOLUTION INTRAVENOUS at 20:19

## 2017-11-12 RX ADMIN — NYSTATIN SCH ML: 100000 SUSPENSION ORAL at 20:19

## 2017-11-12 RX ADMIN — AMIODARONE HYDROCHLORIDE SCH MG: 200 TABLET ORAL at 08:46

## 2017-11-12 RX ADMIN — LEVOTHYROXINE SODIUM SCH MCG: 100 TABLET ORAL at 05:48

## 2017-11-12 RX ADMIN — VANCOMYCIN HYDROCHLORIDE SCH MG: 1 INJECTION, POWDER, LYOPHILIZED, FOR SOLUTION INTRAVENOUS at 08:45

## 2017-11-12 RX ADMIN — VANCOMYCIN HYDROCHLORIDE SCH MG: 1 INJECTION, POWDER, LYOPHILIZED, FOR SOLUTION INTRAVENOUS at 16:37

## 2017-11-12 RX ADMIN — Medication SCH ML: at 16:37

## 2017-11-12 RX ADMIN — NYSTATIN SCH ML: 100000 SUSPENSION ORAL at 12:47

## 2017-11-12 RX ADMIN — ATORVASTATIN CALCIUM SCH MG: 10 TABLET, FILM COATED ORAL at 08:46

## 2017-11-12 RX ADMIN — Medication SCH ML: at 08:45

## 2017-11-12 RX ADMIN — Medication SCH ML: at 20:19

## 2017-11-12 RX ADMIN — WARFARIN SCH MG: 2 TABLET ORAL at 15:33

## 2017-11-12 RX ADMIN — NYSTATIN SCH ML: 100000 SUSPENSION ORAL at 08:45

## 2017-11-12 RX ADMIN — CIPROFLOXACIN SCH MG: 500 TABLET, FILM COATED ORAL at 08:45

## 2017-11-12 RX ADMIN — NYSTATIN SCH ML: 100000 SUSPENSION ORAL at 16:37

## 2017-11-12 RX ADMIN — Medication SCH ML: at 12:47

## 2017-11-12 RX ADMIN — PANTOPRAZOLE SCH MG: 40 TABLET, DELAYED RELEASE ORAL at 08:46

## 2017-11-12 RX ADMIN — Medication SCH MG: at 08:46

## 2017-11-12 NOTE — PROGRESS NOTE
Medicine Progress Note


Date & Time of Visit:


Nov 12, 2017 at 10:35


.


Subjective





No fever.


Occasional cough.


No SOB.


No chest pain or palpitations.


No nausea or vomiting.


Persistent loose stools, but not as frequent.


Voiding less frequently.


Postvoid bladder scan yesterday showed residual of 150 ml.


Wife visiting.


.





Objective





Last 8 Hrs








  Date Time  Temp Pulse Resp B/P (MAP) Pulse Ox O2 Delivery O2 Flow Rate FiO2


 


11/12/17 08:00      Room Air  


 


11/12/17 07:40 36.5 108 22 166/79 (108) 94 Room Air  





    170/99 (122)    








Physical Exam:





General- lying in bed, no distress


ENT- oral thrush improved


Neck- no JVD


Lungs- few rales right base


Heart- RRR, rate ~ 70


Abdomen- + BS, soft, nontender 


Extremities- 1+ pretibial edema; right ankle casted; capillary refill right 

toes less than 2 seconds; brace applied to left ankle 


Neuro- alert


.


Laboratory Results:





Last 24 Hours








Test


  11/12/17


06:37


 


White Blood Count 10.55 K/uL 


 


Red Blood Count 3.99 M/uL 


 


Hemoglobin 11.1 g/dL 


 


Hematocrit 36.1 % 


 


Mean Corpuscular Volume 90.5 fL 


 


Mean Corpuscular Hemoglobin 27.8 pg 


 


Mean Corpuscular Hemoglobin


Concent 30.7 g/dl 


 


 


RDW Standard Deviation 54.1 fL 


 


RDW Coefficient of Variation 16.5 % 


 


Platelet Count 264 K/uL 


 


Mean Platelet Volume 11.0 fL 


 


Prothrombin Time 24.8 SECONDS 


 


Prothromb Time International


Ratio 2.2 


 


 


Sodium Level 137 mmol/L 


 


Potassium Level 4.1 mmol/L 


 


Chloride Level 103 mmol/L 


 


Carbon Dioxide Level 31 mmol/L 


 


Anion Gap 3.0 mmol/L 


 


Blood Urea Nitrogen 13 mg/dl 


 


Creatinine 1.26 mg/dl 


 


Est Creatinine Clear Calc


Drug Dose 51.5 ml/min 


 


 


Estimated GFR (


American) 59.5 


 


 


Estimated GFR (Non-


American 51.3 


 


 


BUN/Creatinine Ratio 9.9 


 


Random Glucose 86 mg/dl 


 


Calcium Level 8.4 mg/dl 











Assessment & Plan





SEPTIC SHOCK SECONDARY TO URINARY TRACT INFECTION (present on admission)


Presented with dysuria.


Initial BP 81/55.


No fever.


WBC 26,950.


Lactate 2.99.


Received IV fluids.


Blood and urine cultures obtained.


Started on broad spectrum antibiotic therapy with piperacillin / tazo + 

vancomycin.


Persistent hypotension after fluid bolus.


Received norepinephrine for pressor support.


Admitted to ICU.


Lactate and hemodynamics improved.


Blood cultures negative.


Urine culture grew E coli.


No evidence of urinary tract obstruction per US and CT.


Transferred out of ICU.


Transitioned to oral therapy with ciprofloxacin; continue for 14 day course in 

light of possible prostatitis.





CLOSTRIDIUM DIFFICILE COLITIS (present on admission)


Patient had loose stool within 24 hrs of admission.


Stool 11/6 +  for C diff.


Persistent loose stools, but improving.


Continue vancomycin.





PAROXYSMAL ATRIAL FIBRILLATION / SVT


History of PAF and SVT.


HR as high as 114 past 24 hrs.


Clinically stable. No CP, SOB.


Exam this morning = regular @ 70/min.


K normal this morning. Mg normal 11/10.


Check EKG for tachycardia or irregular rhythm.


Continue amiodarone, warfarin.





CKD / NEGAR


CKD III with baseline creatinine 1.2-1.4 December 2016.


Serum creatinine 1.87 on admission and abdoul to 1.93.


Probable NEGAR secondary to sepsis.


Serum creatinine today = 1.26.


Follow.





HYPOKALEMIA


Potassium as low as 3.0.


Replaced.


K today = 4.1.


Follow.





HYPOPHOSPHATEMIA


Phosphorus as low as 3.1.2


Replaced.


Phos 11/10 was 2.5.


Follow.





URINARY FREQUENCY


Difficult insertion of Johnson catheter in ED at time of presentation; patient 

hopes to avoid further catheterizations.  


Reported allergy to finasteride.


Continue tamsulosin for BPH.


Continue antibiotics for possible prostatitis.


Postvoid residual by bedside ultrasound 150 mls 11/12.


Symptoms improved.





COUGH


No fever.


Check chest x-ray.





RIGHT ANKLE FRACTURE


Management per Ortho.





VTE PROPHYLAXIS


SQ heparin / warfarin.





DISPOSITION


Expected return to [facility] for skilled care.


Internal Medicine follow-up with Dr. Rucker.


Cardiology follow-up with Dr. Hatch.


Urology follow-up with Dr. Dinero.


.


Consultants:


ICU


Current Inpatient Medications:





Current Inpatient Medications








 Medications


  (Trade)  Dose


 Ordered  Sig/Deanne


 Route  Start Time


 Stop Time Status Last Admin


Dose Admin


 


 Acetaminophen


  (Tylenol Tab)  650 mg  Q4H  PRN


 PO  11/4/17 14:30


 12/4/17 14:29   


 


 


 Pantoprazole


 Sodium


  (Protonix Tab)  40 mg  DAILY


 PO  11/5/17 09:00


 12/5/17 08:59  11/12/17 08:46


40 MG


 


 Amiodarone HCl


  (Cordarone Tab)  200 mg  DAILY


 PO  11/5/17 09:00


 12/5/17 08:59  11/12/17 08:46


200 MG


 


 Aspirin


  (Ecotrin Tab)  81 mg  DAILY


 PO  11/5/17 09:00


 12/5/17 08:59  11/12/17 08:46


81 MG


 


 Atorvastatin


 Calcium


  (Lipitor Tab)  10 mg  DAILY


 PO  11/5/17 09:00


 12/5/17 08:59  11/12/17 08:46


10 MG


 


 Acetaminophen/


 Hydrocodone Bitart


  (Norco 5/325 Tab)  1 tab  Q6H  PRN


 PO  11/4/17 14:45


 11/18/17 14:44   


 


 


 Levothyroxine


 Sodium


  (Synthroid Tab)  100 mcg  DAILYBB


 PO  11/5/17 06:00


 12/5/17 06:59  11/12/17 05:48


100 MCG


 


 Polyethylene


  (Miralax Powder


 Packet)  17 gm  DAILY  PRN


 PO  11/4/17 14:45


 12/4/17 14:44   


 


 


 Tamsulosin HCl


  (Flomax Cap)  0.4 mg  HS


 PO  11/4/17 21:00


 12/4/17 20:59  11/11/17 21:00


0.4 MG


 


 Miscellaneous


 Information


  (Pending Order)  1 ea  DAILY@10


 N/A  11/5/17 10:00


 12/5/17 09:59  11/10/17 10:00


1 EA


 


 Vancomycin HCl


  (Vancomycin Oral


 Soln)  125 mg  QID


 PO  11/6/17 13:00


 11/20/17 12:59  11/12/17 08:45


125 MG


 


 Raspberry


  (Raspberry Syrup


 5ml Cup)  5 ml  QID


 PO  11/6/17 13:00


 11/20/17 12:59  11/12/17 08:45


5 ML


 


 Warfarin Sodium


  (Coumadin Tab)  2 mg  DAILY@16


 PO  11/7/17 16:00


 12/7/17 15:59  11/11/17 15:41


2 MG


 


 Ciprofloxacin


  (Cipro Tab)  500 mg  BID


 PO  11/7/17 11:00


 11/12/17 10:59  11/12/17 08:45


500 MG


 


 Albuterol/


 Ipratropium


  (Duoneb)  3 ml  Q4H  PRN


 INH  11/9/17 06:00


 12/9/17 05:59   


 


 


 Nystatin


  (Mycostatin Susp)  5 ml  QID


 PO  11/9/17 17:00


 11/19/17 16:59  11/12/17 08:45


5 ML

## 2017-11-12 NOTE — DIAGNOSTIC IMAGING REPORT
CHEST ONE VIEW PORTABLE



HISTORY:  86 years-old Male cough acute cough



COMPARISON: Chest radiograph 11/4/2017



TECHNIQUE: Portable upright AP view of the chest



FINDINGS: 

Cardiac silhouette is again enlarged. Bilateral neurostimulator battery packs

are redemonstrated with leads projecting superiorly. There is atherosclerosis of

the aorta. No pneumothorax. Interstitial thickening is again seen bilaterally

appears generally symmetric. There are hazy bibasilar opacities with trace

bilateral pleural effusions. Mild pulmonary vascular congestion. Bones of the

chest appear grossly intact.



IMPRESSION: 

1. Cardiomegaly and pulmonary vascular congestion with unchanged interstitial

thickening bilaterally.

2. Trace bilateral pleural effusions with hazy bibasilar opacities suggest

atelectasis or pneumonia.







The above report was generated using voice recognition software. It may contain

grammatical, syntax or spelling errors.







Electronically signed by:  Sameer Sanchez M.D.

11/12/2017 11:44 AM



Dictated Date/Time:  11/12/2017 11:41 AM

## 2017-11-13 VITALS
HEART RATE: 70 BPM | TEMPERATURE: 97.88 F | OXYGEN SATURATION: 97 % | DIASTOLIC BLOOD PRESSURE: 74 MMHG | SYSTOLIC BLOOD PRESSURE: 121 MMHG

## 2017-11-13 VITALS
SYSTOLIC BLOOD PRESSURE: 123 MMHG | HEART RATE: 60 BPM | DIASTOLIC BLOOD PRESSURE: 67 MMHG | OXYGEN SATURATION: 93 % | TEMPERATURE: 97.7 F

## 2017-11-13 VITALS
HEART RATE: 61 BPM | OXYGEN SATURATION: 94 % | SYSTOLIC BLOOD PRESSURE: 102 MMHG | DIASTOLIC BLOOD PRESSURE: 63 MMHG | TEMPERATURE: 97.7 F

## 2017-11-13 VITALS — OXYGEN SATURATION: 94 %

## 2017-11-13 LAB
ANION GAP SERPL CALC-SCNC: 6 MMOL/L (ref 3–11)
BUN SERPL-MCNC: 12 MG/DL (ref 7–18)
BUN/CREAT SERPL: 9.2 (ref 10–20)
CALCIUM SERPL-MCNC: 8.1 MG/DL (ref 8.5–10.1)
CHLORIDE SERPL-SCNC: 100 MMOL/L (ref 98–107)
CO2 SERPL-SCNC: 31 MMOL/L (ref 21–32)
CREAT CL PREDICTED SERPL C-G-VRATE: 48 ML/MIN
CREAT SERPL-MCNC: 1.35 MG/DL (ref 0.6–1.4)
EOSINOPHIL NFR BLD AUTO: 285 K/UL (ref 130–400)
GLUCOSE SERPL-MCNC: 87 MG/DL (ref 70–99)
HCT VFR BLD CALC: 32.5 % (ref 42–52)
INR PPP: 2.3 (ref 0.9–1.1)
MAGNESIUM SERPL-MCNC: 2 MG/DL (ref 1.8–2.4)
MCH RBC QN AUTO: 28 PG (ref 25–34)
MCHC RBC AUTO-ENTMCNC: 31.4 G/DL (ref 32–36)
MCV RBC AUTO: 89.3 FL (ref 80–100)
PHOSPHATE SERPL-MCNC: 3.1 MG/DL (ref 2.5–4.9)
PMV BLD AUTO: 10.7 FL (ref 7.4–10.4)
POTASSIUM SERPL-SCNC: 4.2 MMOL/L (ref 3.5–5.1)
PROTHROMBIN TIME: 26 SECONDS (ref 9–12)
RBC # BLD AUTO: 3.64 M/UL (ref 4.7–6.1)
SODIUM SERPL-SCNC: 137 MMOL/L (ref 136–145)
WBC # BLD AUTO: 11.01 K/UL (ref 4.8–10.8)

## 2017-11-13 RX ADMIN — AMIODARONE HYDROCHLORIDE SCH MG: 200 TABLET ORAL at 08:14

## 2017-11-13 RX ADMIN — VANCOMYCIN HYDROCHLORIDE SCH MG: 1 INJECTION, POWDER, LYOPHILIZED, FOR SOLUTION INTRAVENOUS at 12:43

## 2017-11-13 RX ADMIN — Medication SCH ML: at 12:43

## 2017-11-13 RX ADMIN — Medication SCH ML: at 08:16

## 2017-11-13 RX ADMIN — NYSTATIN SCH ML: 100000 SUSPENSION ORAL at 12:44

## 2017-11-13 RX ADMIN — VANCOMYCIN HYDROCHLORIDE SCH MG: 1 INJECTION, POWDER, LYOPHILIZED, FOR SOLUTION INTRAVENOUS at 08:18

## 2017-11-13 RX ADMIN — NYSTATIN SCH ML: 100000 SUSPENSION ORAL at 20:34

## 2017-11-13 RX ADMIN — LEVOTHYROXINE SODIUM SCH MCG: 100 TABLET ORAL at 06:17

## 2017-11-13 RX ADMIN — FUROSEMIDE SCH MLS/MIN: 10 INJECTION, SOLUTION INTRAMUSCULAR; INTRAVENOUS at 08:20

## 2017-11-13 RX ADMIN — WARFARIN SCH MG: 2 TABLET ORAL at 16:12

## 2017-11-13 RX ADMIN — Medication SCH ML: at 20:34

## 2017-11-13 RX ADMIN — Medication SCH MG: at 08:14

## 2017-11-13 RX ADMIN — PANTOPRAZOLE SCH MG: 40 TABLET, DELAYED RELEASE ORAL at 08:14

## 2017-11-13 RX ADMIN — VANCOMYCIN HYDROCHLORIDE SCH MG: 1 INJECTION, POWDER, LYOPHILIZED, FOR SOLUTION INTRAVENOUS at 16:39

## 2017-11-13 RX ADMIN — ATORVASTATIN CALCIUM SCH MG: 10 TABLET, FILM COATED ORAL at 08:14

## 2017-11-13 RX ADMIN — TAMSULOSIN HYDROCHLORIDE SCH MG: 0.4 CAPSULE ORAL at 20:34

## 2017-11-13 RX ADMIN — NYSTATIN SCH ML: 100000 SUSPENSION ORAL at 08:16

## 2017-11-13 RX ADMIN — VANCOMYCIN HYDROCHLORIDE SCH MG: 1 INJECTION, POWDER, LYOPHILIZED, FOR SOLUTION INTRAVENOUS at 20:34

## 2017-11-13 RX ADMIN — Medication SCH ML: at 16:39

## 2017-11-13 RX ADMIN — NYSTATIN SCH ML: 100000 SUSPENSION ORAL at 16:39

## 2017-11-13 NOTE — PROGRESS NOTE
Medicine Progress Note


Date & Time of Visit:


Nov 13, 2017 at 14:40


.


Subjective





Working with PT at time of my assessment.


Good urine output with IV furosemide.


No SOB.


Cough improved.


No chest pain.


No nausea or vomiting.


Diarrhea improved.


.





Objective





Last 8 Hrs








  Date Time  Temp Pulse Resp B/P (MAP) Pulse Ox O2 Delivery O2 Flow Rate FiO2


 


11/13/17 16:00     94 Room Air  


 


11/13/17 15:38 36.5 61 18 102/63 (76) 94 Room Air  








Physical Exam:





General- sitting on side of bed, no distress


ENT- 


Neck- no JVD


Lungs- essentially clear


Heart- RRR


Abdomen- + BS, soft, nontender 


Extremities- 1+ pretibial edema; right ankle casted; brace applied to left 

ankle 


Neuro- alert


.


Laboratory Results:





Last 24 Hours








Test


  11/13/17


06:24


 


White Blood Count 11.01 K/uL 


 


Red Blood Count 3.64 M/uL 


 


Hemoglobin 10.2 g/dL 


 


Hematocrit 32.5 % 


 


Mean Corpuscular Volume 89.3 fL 


 


Mean Corpuscular Hemoglobin 28.0 pg 


 


Mean Corpuscular Hemoglobin


Concent 31.4 g/dl 


 


 


RDW Standard Deviation 54.9 fL 


 


RDW Coefficient of Variation 16.8 % 


 


Platelet Count 285 K/uL 


 


Mean Platelet Volume 10.7 fL 


 


Prothrombin Time 26.0 SECONDS 


 


Prothromb Time International


Ratio 2.3 


 


 


Sodium Level 137 mmol/L 


 


Potassium Level 4.2 mmol/L 


 


Chloride Level 100 mmol/L 


 


Carbon Dioxide Level 31 mmol/L 


 


Anion Gap 6.0 mmol/L 


 


Blood Urea Nitrogen 12 mg/dl 


 


Creatinine 1.35 mg/dl 


 


Est Creatinine Clear Calc


Drug Dose 48.0 ml/min 


 


 


Estimated GFR (


American) 54.7 


 


 


Estimated GFR (Non-


American 47.2 


 


 


BUN/Creatinine Ratio 9.2 


 


Random Glucose 87 mg/dl 


 


Calcium Level 8.1 mg/dl 


 


Phosphorus Level 3.1 mg/dl 


 


Magnesium Level 2.0 mg/dl 


 


Pro-B-Type Natriuretic Peptide 24242 pg/ml 











Assessment & Plan





SEPTIC SHOCK SECONDARY TO URINARY TRACT INFECTION OR C DIFF (present on 

admission)


Presented with dysuria.


Initial BP 81/55.


No fever.


WBC 26,950.


Lactate 2.99.


Received IV fluids.


Blood and urine cultures obtained.


Started on broad spectrum antibiotic therapy with piperacillin / tazo + 

vancomycin.


Persistent hypotension after fluid bolus.


Received norepinephrine for pressor support.


Admitted to ICU.


Lactate and hemodynamics improved.


Blood cultures negative.


Urine culture grew E coli.


No evidence of urinary tract obstruction per US and CT.


Transferred out of ICU.


Transitioned to oral therapy with ciprofloxacin; continue for 14 day course in 

light of possible prostatitis.





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


Patient had loose stool at NH prior to admission and within 24 hrs of admission.


Stool 11/6 +  for C diff.


Technically may be counted as hospital acquired infection since PCR wasn't done 

until 11/6, but clinically suspect that C diff was present on admission.


Receiving vancomycin with improvement.





PAROXYSMAL ATRIAL FIBRILLATION / SVT


History of PAF and SVT.


HR elevated at times.


Clinically stable. No CP, SOB.


Check EKG for tachycardia or irregular rhythm.


Continue amiodarone, warfarin.





FLUID OVERLOAD


Due to aggressive fluid resuscitation for septic shock.


Echo 2016 showed normal LVEF; no need to repeat at this time.


Continue diuretics.





CKD / NEGAR


CKD III with baseline creatinine 1.2-1.4 December 2016.


Serum creatinine 1.87 on admission and abdoul to 1.93.


Probable NEGAR secondary to sepsis.


Serum creatinine today = 1.35.


Follow.





HYPOKALEMIA


Potassium as low as 3.0.


Replaced.


K today = 4.2.


Follow.





HYPOPHOSPHATEMIA


Phosphorus as low as 3.1.2


Replaced.


Phos 11/10 was 2.5.


Follow.





URINARY FREQUENCY


Difficult insertion of Johnson catheter in ED at time of presentation; patient 

hopes to avoid further catheterizations.  


Reported allergy to finasteride.


Continue tamsulosin for BPH.


Continue antibiotics for possible prostatitis.


Postvoid residual by bedside ultrasound 150 mls 11/12.


Symptoms improved.





RIGHT ANKLE FRACTURE


Management per Ortho.





VTE PROPHYLAXIS


SQ heparin / warfarin.





DISPOSITION


Expected return to [facility] for skilled care.


Internal Medicine follow-up with Dr. Rucker.


Cardiology follow-up with Dr. Hatch.


Urology follow-up with Dr. Dinero.





Wife visiting and given update.


.


Consultants:


ICU


Current Inpatient Medications:





Current Inpatient Medications








 Medications


  (Trade)  Dose


 Ordered  Sig/Deanne


 Route  Start Time


 Stop Time Status Last Admin


Dose Admin


 


 Acetaminophen


  (Tylenol Tab)  650 mg  Q4H  PRN


 PO  11/4/17 14:30


 12/4/17 14:29   


 


 


 Pantoprazole


 Sodium


  (Protonix Tab)  40 mg  DAILY


 PO  11/5/17 09:00


 12/5/17 08:59  11/13/17 08:14


40 MG


 


 Amiodarone HCl


  (Cordarone Tab)  200 mg  DAILY


 PO  11/5/17 09:00


 12/5/17 08:59  11/13/17 08:14


200 MG


 


 Aspirin


  (Ecotrin Tab)  81 mg  DAILY


 PO  11/5/17 09:00


 12/5/17 08:59  11/13/17 08:14


81 MG


 


 Atorvastatin


 Calcium


  (Lipitor Tab)  10 mg  DAILY


 PO  11/5/17 09:00


 12/5/17 08:59  11/13/17 08:14


10 MG


 


 Acetaminophen/


 Hydrocodone Bitart


  (Norco 5/325 Tab)  1 tab  Q6H  PRN


 PO  11/4/17 14:45


 11/18/17 14:44   


 


 


 Levothyroxine


 Sodium


  (Synthroid Tab)  100 mcg  DAILYBB


 PO  11/5/17 06:00


 12/5/17 06:59  11/13/17 06:17


100 MCG


 


 Polyethylene


  (Miralax Powder


 Packet)  17 gm  DAILY  PRN


 PO  11/4/17 14:45


 12/4/17 14:44   


 


 


 Tamsulosin HCl


  (Flomax Cap)  0.4 mg  HS


 PO  11/4/17 21:00


 12/4/17 20:59  11/13/17 20:34


0.4 MG


 


 Vancomycin HCl


  (Vancomycin Oral


 Soln)  125 mg  QID


 PO  11/6/17 13:00


 11/20/17 12:59  11/13/17 20:34


125 MG


 


 Raspberry


  (Raspberry Syrup


 5ml Cup)  5 ml  QID


 PO  11/6/17 13:00


 11/20/17 12:59  11/13/17 20:34


5 ML


 


 Warfarin Sodium


  (Coumadin Tab)  2 mg  DAILY@16


 PO  11/7/17 16:00


 12/7/17 15:59  11/13/17 16:12


2 MG


 


 Albuterol/


 Ipratropium


  (Duoneb)  3 ml  Q4H  PRN


 INH  11/9/17 06:00


 12/9/17 05:59   


 


 


 Nystatin


  (Mycostatin Susp)  5 ml  QID


 PO  11/9/17 17:00


 11/19/17 16:59  11/13/17 20:34


5 ML


 


 Furosemide 20 mg/


 Syringe  2 ml @ 4


 mls/min  TODAY@0900


 IV  11/13/17 09:00


 12/13/17 08:59  11/13/17 08:20


4 MLS/MIN

## 2017-11-13 NOTE — DIAGNOSTIC IMAGING REPORT
CHEST ONE VIEW PORTABLE



CLINICAL HISTORY: Congestive failure    



COMPARISON STUDY:  11/12/2017



FINDINGS: The heart remains mildly enlarged. There is a retrocardiac opacity

likely representing a hiatal hernia. Bilateral neurostimulator as are

visualized. The right lead is fractured. This finding remains unchanged. There

is mild central pulmonary vascular congestion. Small pleural effusions are

suspected. The findings appear slightly improved.[ 



IMPRESSION: 

1. Improving mild pulmonary vascular congestion with small pleural effusions and

improving aeration the lung bases

2. The right neurostimulator lead appears fractured







Electronically signed by:  Jake Duval M.D.

11/13/2017 7:19 AM



Dictated Date/Time:  11/13/2017 7:17 AM

## 2017-11-14 VITALS — OXYGEN SATURATION: 94 %

## 2017-11-14 VITALS — DIASTOLIC BLOOD PRESSURE: 56 MMHG | OXYGEN SATURATION: 100 % | SYSTOLIC BLOOD PRESSURE: 112 MMHG | HEART RATE: 59 BPM

## 2017-11-14 VITALS
DIASTOLIC BLOOD PRESSURE: 62 MMHG | TEMPERATURE: 97.7 F | OXYGEN SATURATION: 94 % | SYSTOLIC BLOOD PRESSURE: 109 MMHG | HEART RATE: 64 BPM

## 2017-11-14 VITALS
HEART RATE: 56 BPM | SYSTOLIC BLOOD PRESSURE: 130 MMHG | OXYGEN SATURATION: 96 % | TEMPERATURE: 97.34 F | DIASTOLIC BLOOD PRESSURE: 68 MMHG

## 2017-11-14 VITALS
DIASTOLIC BLOOD PRESSURE: 58 MMHG | TEMPERATURE: 97.88 F | HEART RATE: 56 BPM | OXYGEN SATURATION: 95 % | SYSTOLIC BLOOD PRESSURE: 97 MMHG

## 2017-11-14 VITALS — OXYGEN SATURATION: 95 %

## 2017-11-14 LAB
ANION GAP SERPL CALC-SCNC: 7 MMOL/L (ref 3–11)
BUN SERPL-MCNC: 16 MG/DL (ref 7–18)
BUN/CREAT SERPL: 11.4 (ref 10–20)
CALCIUM SERPL-MCNC: 8 MG/DL (ref 8.5–10.1)
CHLORIDE SERPL-SCNC: 101 MMOL/L (ref 98–107)
CO2 SERPL-SCNC: 30 MMOL/L (ref 21–32)
CREAT CL PREDICTED SERPL C-G-VRATE: 47 ML/MIN
CREAT SERPL-MCNC: 1.38 MG/DL (ref 0.6–1.4)
EOSINOPHIL NFR BLD AUTO: 284 K/UL (ref 130–400)
GLUCOSE SERPL-MCNC: 84 MG/DL (ref 70–99)
HCT VFR BLD CALC: 32.6 % (ref 42–52)
INR PPP: 2.5 (ref 0.9–1.1)
MCH RBC QN AUTO: 27.7 PG (ref 25–34)
MCHC RBC AUTO-ENTMCNC: 31.3 G/DL (ref 32–36)
MCV RBC AUTO: 88.6 FL (ref 80–100)
PMV BLD AUTO: 10.9 FL (ref 7.4–10.4)
POTASSIUM SERPL-SCNC: 4.1 MMOL/L (ref 3.5–5.1)
PROTHROMBIN TIME: 27.4 SECONDS (ref 9–12)
RBC # BLD AUTO: 3.68 M/UL (ref 4.7–6.1)
SODIUM SERPL-SCNC: 138 MMOL/L (ref 136–145)
WBC # BLD AUTO: 9.35 K/UL (ref 4.8–10.8)

## 2017-11-14 RX ADMIN — FUROSEMIDE SCH MLS/MIN: 10 INJECTION, SOLUTION INTRAMUSCULAR; INTRAVENOUS at 08:02

## 2017-11-14 RX ADMIN — NYSTATIN SCH ML: 100000 SUSPENSION ORAL at 21:15

## 2017-11-14 RX ADMIN — LEVOTHYROXINE SODIUM SCH MCG: 100 TABLET ORAL at 06:01

## 2017-11-14 RX ADMIN — TAMSULOSIN HYDROCHLORIDE SCH MG: 0.4 CAPSULE ORAL at 21:15

## 2017-11-14 RX ADMIN — VANCOMYCIN HYDROCHLORIDE SCH MG: 1 INJECTION, POWDER, LYOPHILIZED, FOR SOLUTION INTRAVENOUS at 13:46

## 2017-11-14 RX ADMIN — VANCOMYCIN HYDROCHLORIDE SCH MG: 1 INJECTION, POWDER, LYOPHILIZED, FOR SOLUTION INTRAVENOUS at 08:00

## 2017-11-14 RX ADMIN — Medication SCH MG: at 08:01

## 2017-11-14 RX ADMIN — Medication SCH ML: at 08:00

## 2017-11-14 RX ADMIN — Medication SCH ML: at 21:14

## 2017-11-14 RX ADMIN — VANCOMYCIN HYDROCHLORIDE SCH MG: 1 INJECTION, POWDER, LYOPHILIZED, FOR SOLUTION INTRAVENOUS at 21:14

## 2017-11-14 RX ADMIN — NYSTATIN SCH ML: 100000 SUSPENSION ORAL at 08:01

## 2017-11-14 RX ADMIN — Medication SCH ML: at 13:46

## 2017-11-14 RX ADMIN — ATORVASTATIN CALCIUM SCH MG: 10 TABLET, FILM COATED ORAL at 08:00

## 2017-11-14 RX ADMIN — PANTOPRAZOLE SCH MG: 40 TABLET, DELAYED RELEASE ORAL at 08:00

## 2017-11-14 RX ADMIN — VANCOMYCIN HYDROCHLORIDE SCH MG: 1 INJECTION, POWDER, LYOPHILIZED, FOR SOLUTION INTRAVENOUS at 17:08

## 2017-11-14 RX ADMIN — NYSTATIN SCH ML: 100000 SUSPENSION ORAL at 13:46

## 2017-11-14 RX ADMIN — WARFARIN SCH MG: 2 TABLET ORAL at 16:17

## 2017-11-14 RX ADMIN — NYSTATIN SCH ML: 100000 SUSPENSION ORAL at 17:08

## 2017-11-14 RX ADMIN — AMIODARONE HYDROCHLORIDE SCH MG: 200 TABLET ORAL at 08:01

## 2017-11-14 RX ADMIN — Medication SCH ML: at 17:08

## 2017-11-14 NOTE — PROGRESS NOTE
Medicine Progress Note


Date & Time of Visit:


Nov 14, 2017 at 09:10


.


Subjective





Afebrile.


No chest pain.


No cough or SOB.


No nausea or vomiting.


Stools no longer loose.


Johnson catheter inserted last night for urinary frequency.


.





Objective





Last 8 Hrs








  Date Time  Temp Pulse Resp B/P (MAP) Pulse Ox O2 Delivery O2 Flow Rate FiO2


 


11/14/17 08:00     94 Room Air  


 


11/14/17 07:31 36.5 64 18 109/62 (78) 94 Room Air  








Physical Exam:





General- lying in bed, no distress


Neck- no JVD


Lungs- clear


Heart- RRR, no gallop


Abdomen- + BS, soft, nontender 


- Johnson cath draining clear urine


Extremities- 1+ pretibial edema; right ankle casted; capillary refill right 

toes < 2 sec; brace applied to left ankle 


Neuro- alert


.


Laboratory Results:





Last 24 Hours








Test


  11/14/17


06:40


 


White Blood Count 9.35 K/uL 


 


Red Blood Count 3.68 M/uL 


 


Hemoglobin 10.2 g/dL 


 


Hematocrit 32.6 % 


 


Mean Corpuscular Volume 88.6 fL 


 


Mean Corpuscular Hemoglobin 27.7 pg 


 


Mean Corpuscular Hemoglobin


Concent 31.3 g/dl 


 


 


RDW Standard Deviation 53.9 fL 


 


RDW Coefficient of Variation 16.6 % 


 


Platelet Count 284 K/uL 


 


Mean Platelet Volume 10.9 fL 


 


Prothrombin Time 27.4 SECONDS 


 


Prothromb Time International


Ratio 2.5 


 


 


Sodium Level 138 mmol/L 


 


Potassium Level 4.1 mmol/L 


 


Chloride Level 101 mmol/L 


 


Carbon Dioxide Level 30 mmol/L 


 


Anion Gap 7.0 mmol/L 


 


Blood Urea Nitrogen 16 mg/dl 


 


Creatinine 1.38 mg/dl 


 


Est Creatinine Clear Calc


Drug Dose 47.0 ml/min 


 


 


Estimated GFR (


American) 53.3 


 


 


Estimated GFR (Non-


American 46.0 


 


 


BUN/Creatinine Ratio 11.4 


 


Random Glucose 84 mg/dl 


 


Calcium Level 8.0 mg/dl 











Assessment & Plan





SEPTIC SHOCK (present on admission)


Presented with dysuria and diarrhea


Initial BP 81/55.


No fever.


WBC 26,950.


Lactate 2.99.


Received IV fluids.


Blood and urine cultures obtained.


Started on broad spectrum antibiotic therapy with piperacillin / tazo + 

vancomycin.


Persistent hypotension after fluid bolus.


Received norepinephrine for pressor support.


Admitted to ICU.


Lactate and hemodynamics improved.


Blood cultures negative.


Management of UTI and C diff as discussed separately below.


Transferred out of ICU


Transitioned to oral therapy with ciprofloxacin; continue for 14 day course in 

light of possible prostatitis.





URINARY TRACT INFECTION (present on admission)


Had dysuria at time of admission.


UA showed WBC's and bacteria.


Urine culture grew E coli; blood cultures negative.


No evidence of urinary tract obstruction per US and CT.


Transitioned to oral therapy with ciprofloxacin; continue for 14 day course in 

light of possible prostatitis.





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


Patient had loose stool at NH prior to admission and within 24 hrs of admission.


Stool 11/6 +  for C diff.


Technically may be counted as hospital acquired infection since PCR wasn't done 

until 11/6, but clinically suspect that C diff was present on admission.


Unclear whether UTI or C diff was cause of sepsis.


Receiving vancomycin with improvement.


Requires prolonged course of antibiotic therapy for UTI.


Will continue vancomycin for C diff until ciprofloxacin finished and then for 7 

additional days to minimize risk of recurrence.





PAROXYSMAL ATRIAL FIBRILLATION / SVT


History of PAF and SVT.


Continue amiodarone, warfarin.





FLUID OVERLOAD


Due to aggressive fluid resuscitation for septic shock.


Echo 2016 showed normal LVEF; no need to repeat at this time.


Chest x-ray 11/13 showed mild pulmonary vascular congestion, improving.


Continue diuretics.





CKD / NEGAR


CKD III with baseline creatinine 1.2-1.4 December 2016.


Serum creatinine 1.87 on admission and abdoul to 1.93.


Probable NEGAR secondary to sepsis.


Serum creatinine today = 1.38.


Follow.





HYPOKALEMIA


Potassium as low as 3.0.


Replaced.


K today = 4.1.


Follow.





HYPOPHOSPHATEMIA


Phosphorus as low as 3.1.2


Replaced.


Phos 11/10 was 2.5.


Follow.





URINARY FREQUENCY


Difficult insertion of Johnson catheter in ED at time of presentation; patient 

hopes to avoid further catheterizations.  


Reported allergy to finasteride.


Continue tamsulosin for BPH.


Continue antibiotics for possible prostatitis.


Postvoid residual by bedside ultrasound 150 mls 11/12.


Symptoms improved, but worsened with IV furosemide.


Johnson catheter reinserted due to urinary frequency, but best to remove.





RIGHT ANKLE FRACTURE


Management per Ortho.





VTE PROPHYLAXIS


SQ heparin / warfarin.





DISPOSITION


Return to [facility] for skilled care.


Internal Medicine follow-up with Dr. Rucker.


Cardiology follow-up with Dr. Hatch.


Urology follow-up with Dr. Dinero.





Daughter Anshul given update by phone.


.


Consultants:


ICU


Current Inpatient Medications:





Current Inpatient Medications








 Medications


  (Trade)  Dose


 Ordered  Sig/Deanne


 Route  Start Time


 Stop Time Status Last Admin


Dose Admin


 


 Acetaminophen


  (Tylenol Tab)  650 mg  Q4H  PRN


 PO  11/4/17 14:30


 12/4/17 14:29   


 


 


 Pantoprazole


 Sodium


  (Protonix Tab)  40 mg  DAILY


 PO  11/5/17 09:00


 12/5/17 08:59  11/14/17 08:00


40 MG


 


 Amiodarone HCl


  (Cordarone Tab)  200 mg  DAILY


 PO  11/5/17 09:00


 12/5/17 08:59  11/14/17 08:01


200 MG


 


 Aspirin


  (Ecotrin Tab)  81 mg  DAILY


 PO  11/5/17 09:00


 12/5/17 08:59  11/14/17 08:01


81 MG


 


 Atorvastatin


 Calcium


  (Lipitor Tab)  10 mg  DAILY


 PO  11/5/17 09:00


 12/5/17 08:59  11/14/17 08:00


10 MG


 


 Acetaminophen/


 Hydrocodone Bitart


  (Norco 5/325 Tab)  1 tab  Q6H  PRN


 PO  11/4/17 14:45


 11/18/17 14:44   


 


 


 Levothyroxine


 Sodium


  (Synthroid Tab)  100 mcg  DAILYBB


 PO  11/5/17 06:00


 12/5/17 06:59  11/14/17 06:01


100 MCG


 


 Polyethylene


  (Miralax Powder


 Packet)  17 gm  DAILY  PRN


 PO  11/4/17 14:45


 12/4/17 14:44   


 


 


 Tamsulosin HCl


  (Flomax Cap)  0.4 mg  HS


 PO  11/4/17 21:00


 12/4/17 20:59  11/13/17 20:34


0.4 MG


 


 Vancomycin HCl


  (Vancomycin Oral


 Soln)  125 mg  QID


 PO  11/6/17 13:00


 11/20/17 12:59  11/14/17 08:00


125 MG


 


 Raspberry


  (Raspberry Syrup


 5ml Cup)  5 ml  QID


 PO  11/6/17 13:00


 11/20/17 12:59  11/14/17 08:00


5 ML


 


 Warfarin Sodium


  (Coumadin Tab)  2 mg  DAILY@16


 PO  11/7/17 16:00


 12/7/17 15:59  11/13/17 16:12


2 MG


 


 Albuterol/


 Ipratropium


  (Duoneb)  3 ml  Q4H  PRN


 INH  11/9/17 06:00


 12/9/17 05:59   


 


 


 Nystatin


  (Mycostatin Susp)  5 ml  QID


 PO  11/9/17 17:00


 11/19/17 16:59  11/14/17 08:01


5 ML


 


 Furosemide 20 mg/


 Syringe  2 ml @ 4


 mls/min  TODAY@0900


 IV  11/13/17 09:00


 12/13/17 08:59  11/14/17 08:02


4 MLS/MIN


 


 Miconazole Nitrate


  (Desenex Powder)  1 appln  PRN  PRN


 EXT  11/14/17 01:45


 12/14/17 01:44

## 2017-11-15 VITALS
HEART RATE: 53 BPM | OXYGEN SATURATION: 97 % | TEMPERATURE: 97.88 F | SYSTOLIC BLOOD PRESSURE: 99 MMHG | DIASTOLIC BLOOD PRESSURE: 63 MMHG

## 2017-11-15 VITALS — OXYGEN SATURATION: 94 %

## 2017-11-15 VITALS
DIASTOLIC BLOOD PRESSURE: 72 MMHG | TEMPERATURE: 97.52 F | OXYGEN SATURATION: 96 % | HEART RATE: 54 BPM | SYSTOLIC BLOOD PRESSURE: 118 MMHG

## 2017-11-15 VITALS — OXYGEN SATURATION: 97 %

## 2017-11-15 VITALS
HEART RATE: 75 BPM | OXYGEN SATURATION: 94 % | SYSTOLIC BLOOD PRESSURE: 110 MMHG | TEMPERATURE: 97.88 F | DIASTOLIC BLOOD PRESSURE: 61 MMHG

## 2017-11-15 LAB
INR PPP: 2.4 (ref 0.9–1.1)
PROTHROMBIN TIME: 27 SECONDS (ref 9–12)

## 2017-11-15 RX ADMIN — VANCOMYCIN HYDROCHLORIDE SCH MG: 1 INJECTION, POWDER, LYOPHILIZED, FOR SOLUTION INTRAVENOUS at 14:20

## 2017-11-15 RX ADMIN — Medication SCH ML: at 08:03

## 2017-11-15 RX ADMIN — NYSTATIN SCH ML: 100000 SUSPENSION ORAL at 17:00

## 2017-11-15 RX ADMIN — Medication SCH ML: at 20:28

## 2017-11-15 RX ADMIN — VANCOMYCIN HYDROCHLORIDE SCH MG: 1 INJECTION, POWDER, LYOPHILIZED, FOR SOLUTION INTRAVENOUS at 17:00

## 2017-11-15 RX ADMIN — Medication SCH ML: at 14:20

## 2017-11-15 RX ADMIN — TAMSULOSIN HYDROCHLORIDE SCH MG: 0.4 CAPSULE ORAL at 20:28

## 2017-11-15 RX ADMIN — NYSTATIN SCH ML: 100000 SUSPENSION ORAL at 08:03

## 2017-11-15 RX ADMIN — FUROSEMIDE SCH MLS/MIN: 10 INJECTION, SOLUTION INTRAMUSCULAR; INTRAVENOUS at 08:15

## 2017-11-15 RX ADMIN — Medication SCH MG: at 08:04

## 2017-11-15 RX ADMIN — NYSTATIN SCH ML: 100000 SUSPENSION ORAL at 14:20

## 2017-11-15 RX ADMIN — VANCOMYCIN HYDROCHLORIDE SCH MG: 1 INJECTION, POWDER, LYOPHILIZED, FOR SOLUTION INTRAVENOUS at 08:03

## 2017-11-15 RX ADMIN — WARFARIN SCH MG: 2 TABLET ORAL at 15:40

## 2017-11-15 RX ADMIN — LEVOTHYROXINE SODIUM SCH MCG: 100 TABLET ORAL at 06:02

## 2017-11-15 RX ADMIN — PANTOPRAZOLE SCH MG: 40 TABLET, DELAYED RELEASE ORAL at 08:04

## 2017-11-15 RX ADMIN — ATORVASTATIN CALCIUM SCH MG: 10 TABLET, FILM COATED ORAL at 08:04

## 2017-11-15 RX ADMIN — AMIODARONE HYDROCHLORIDE SCH MG: 200 TABLET ORAL at 08:04

## 2017-11-15 RX ADMIN — Medication SCH ML: at 17:00

## 2017-11-15 RX ADMIN — VANCOMYCIN HYDROCHLORIDE SCH MG: 1 INJECTION, POWDER, LYOPHILIZED, FOR SOLUTION INTRAVENOUS at 20:28

## 2017-11-15 RX ADMIN — NYSTATIN SCH ML: 100000 SUSPENSION ORAL at 20:28

## 2017-11-16 VITALS — OXYGEN SATURATION: 93 %

## 2017-11-16 VITALS
TEMPERATURE: 97.7 F | DIASTOLIC BLOOD PRESSURE: 56 MMHG | OXYGEN SATURATION: 93 % | SYSTOLIC BLOOD PRESSURE: 100 MMHG | HEART RATE: 64 BPM

## 2017-11-16 VITALS
HEART RATE: 66 BPM | DIASTOLIC BLOOD PRESSURE: 56 MMHG | TEMPERATURE: 97.7 F | SYSTOLIC BLOOD PRESSURE: 100 MMHG | OXYGEN SATURATION: 93 %

## 2017-11-16 VITALS — HEART RATE: 64 BPM

## 2017-11-16 RX ADMIN — VANCOMYCIN HYDROCHLORIDE SCH MG: 1 INJECTION, POWDER, LYOPHILIZED, FOR SOLUTION INTRAVENOUS at 12:17

## 2017-11-16 RX ADMIN — NYSTATIN SCH ML: 100000 SUSPENSION ORAL at 08:13

## 2017-11-16 RX ADMIN — LEVOTHYROXINE SODIUM SCH MCG: 100 TABLET ORAL at 06:26

## 2017-11-16 RX ADMIN — VANCOMYCIN HYDROCHLORIDE SCH MG: 1 INJECTION, POWDER, LYOPHILIZED, FOR SOLUTION INTRAVENOUS at 08:13

## 2017-11-16 RX ADMIN — WARFARIN SCH MG: 2 TABLET ORAL at 15:42

## 2017-11-16 RX ADMIN — NYSTATIN SCH ML: 100000 SUSPENSION ORAL at 12:16

## 2017-11-16 RX ADMIN — Medication SCH ML: at 08:13

## 2017-11-16 RX ADMIN — Medication SCH ML: at 12:17

## 2017-11-16 RX ADMIN — PANTOPRAZOLE SCH MG: 40 TABLET, DELAYED RELEASE ORAL at 08:13

## 2017-11-16 RX ADMIN — ATORVASTATIN CALCIUM SCH MG: 10 TABLET, FILM COATED ORAL at 08:13

## 2017-11-16 RX ADMIN — Medication SCH MG: at 08:12

## 2017-11-16 RX ADMIN — AMIODARONE HYDROCHLORIDE SCH MG: 200 TABLET ORAL at 08:13

## 2017-11-16 NOTE — DISCHARGE INSTRUCTIONS
Discharge Instructions


Date of Service


Nov 16, 2017.





Admission


Reason for Admission:  Septic Shock





Discharge


Discharge Diagnosis / Problem:  Septic shock 2/2 E coli UTI





Discharge Goals


Goal(s):  Prevent Disease Progression





Activity Recommendations


Activity Limitations:  per Instructions/Follow-up section





.





Instructions / Follow-Up


Instructions / Follow-Up


Please take all medications as instructed. 





Please note your coumadin dose has been changed.  You will need to have your 

INR checked within two days of arrival to [facility] and then as needed to 

adjust levels of coumadin and achieve a goal INR 2-3. 





Please continue vancomycin for another 4 days.





It is strongly recommended that you set up a follow-up appointment with your 

primary care provider within one week of discharge to ensure they are aware of 

all changes that occurred during this hospitalization. 





Please follow-up with your Orthopedic surgeon per their instructions post-

operatively. 





It was a pleasure taking care of you!  Call if you have any questions or 

problems.  You can reach a [facility] hospitalist on duty at [facility] 24 hours a day by calling [phone].





Take care of yourself.





Grace Ervin DO


[facility] Hospitalist





Current Hospital Diet


Patient's current hospital diet: AHA Diet (Heart Healthy)





Discharge Diet


Recommended Diet:  AHA Diet (Heart Healthy)





Pending Studies


Studies pending at discharge:  no





Laboratory Results





Lipid Panel








Test


  11/1/17


07:23 Range/Units


 


 


Triglycerides Level 81  0-150  mg/dl


 


Cholesterol Level 76  0-200  mg/dl


 


HDL Cholesterol 33   mg/dl


 


Cholesterol/HDL Ratio 2.3   


 


LDL Cholesterol, Calculated 27   mg/dl











Medical Emergencies








.


Who to Call and When:





Medical Emergencies:  If at any time you feel your situation is an emergency, 

please call 911 immediately.





.





Non-Emergent Contact


Non-Emergency issues call your:  Primary Care Provider





.


.








"Provider Documentation" section prepared by Grace Ervin.








.





VTE Core Measure


Inpt VTE Proph given/why not?:  Unfractionated heparin SQ, Warfarin (Coumadin)

## 2017-11-16 NOTE — DISCHARGE SUMMARY
Discharge Summary


Date of Service


Nov 16, 2017.





Discharge Summary


Admission Date:


Nov 4, 2017 at 14:30


Discharge Date:  Nov 16, 2017


Discharge Disposition:  Skilled nursing facility


Principal Diagnosis:


1.  Septic shock 2/2 E coli


2.  c-diff diarrhea 


3.  Carotid disease


4.  PAF


5.  Tremors s/p deep brain stimulators


6.  NEGAR in setting CKD Stage III-resolved.  Baseline creat 1.3-1.6.  


7.  Hypothyroidism


8.  Hypokalemia


9.  Hypophosphatemia


10.  R ankle fracture s/p ORIF one month ago


Procedures:


None.


Vaccinations:


None.


Consultations:


Critical Care Medicine


.


Pending Studies/Follow-Up:


see instructions below.





Medication Reconciliation


New Medications:  


Vancomycin HCl (Vancomycin HCl) 100 Mg/Ml Inj


125 MG PO QID for 4 Days, #16 EA


Please complete 4 more days, last day of treatment 11/20.


Warfarin Sod (Coumadin) 2 Mg Tab


2 MG PO DAILY@16 for 30 Days, #30 TAB





 


Continued Medications:  


Acetaminophen (Acetaminophen ER) 650 Mg Tab


650 MG PO Q4 PRN for Pain or Fever





Acetaminophen (Tylenol) 650 Mg Supp


650 MG RE Q4 PRN for Pain





Amiodarone Hcl (Cordarone) 200 Mg Tab


200 MG PO DAILY, TAB





Aspirin (Aspirin EC Low Dose) 81 Mg Ectab


81 MG PO DAILY, #30 2 Refills





Atorvastatin (Lipitor) 10 Mg Tab


10 MG PO DAILY, TAB





Docusate Sodium (Docusate Sodium) 100 Mg Cap


100 MG PO BID





Hydrocodone/Acetaminophen 5MG/325MG (Norco 5MG/325MG)  Tab


1-2 TAB PO Q6H PRN for Pain for 7 Days, #30 TAB


PRN PAIN


Levothyroxine Sodium (Synthroid) 100 Mcg Tab


100 MCG PO DAILY, TAB





Omeprazole (Prilosec) 40 Mg Cap


40 MG PO DAILYBB, CAP





Polyethylene (Miralax) 17 Gm Pow


17 GM PO DAILY PRN for Constipation for 30 Days, 1 Refill





Probiotic Product (Probiotic) 1 Cap Cap


1 CAP PO DAILY





Tamsulosin Hcl (Flomax) 0.4 Mg Cap


0.4 MG PO HS, CAP





 


Discontinued Medications:  


Warfarin Sod (Coumadin) 1 Mg Tab


1 MG PO 3XWK


TAKES MON,WED,FRI


Warfarin Sod (Jantoven) 1 Mg Tab


0.5 MG PO 4XWK


TAKES SUN,TUES,THURS,SAT








Admission Information


HPI (per Admitting provider):


85 yo M presents with worsening chills and fevers this morning.  He had some 

dysuria recently but mostly rigors has been noted by he and other family 

members while the patient has been at [facility].  He was admitted one month 

ago wtih a R ankle fracture and on discharge went to [facility] until 

recently, transferred to [facility] for a few month stay as a transition to 

home.  He reports tolerating PO, denies any pain in his legs, abdomen, chest or 

anywhere else.  Has notable abdominal discomfort on exam and bilateral CVA 

tenderness.  He denies any nausea, vomiting or diarrhea.  He and family report 

some PO intake here and there, and patient states that he has been eating.  He 

is mentating normally.  Mucous membranes are very dry on exam and he appears 

dehydrated.  He was evaluated by myself and the ICU physician, DR. Nigel Balbuena

, simultaneously.


Physical Exam (per Admitting):  


   General Appearance:  WD/WN, no apparent distress, + pertinent finding (lying 

supine, appears very dry and deconditioned.  Elderly)


   Head:  normocephalic, atraumatic


   Eyes:  normal inspection, PERRL, EOMI, sclerae normal


   ENT:  TMs normal, + pertinent finding (very dry mucous membranes)


   Neck:  supple, trachea midline


   Respiratory/Chest:  lungs clear, normal breath sounds, no respiratory 

distress, no accessory muscle use


   Cardiovascular:  no edema, normal peripheral pulses (except in L ankle where 

there is a hard cast present.), + tachycardia, + pertinent finding


   Abdomen/GI:  normal bowel sounds, soft, + tenderness, + pertinent finding (

mild diffuse tenderness to palpation, bilateral CVA tenderness)


   Back:  normal inspection, + left CVA tenderness, + right CVA tenderness


   Extremities/Musculoskelatal:  + pertinent finding (pt moves all extremities 

equally, has aircast/brace on L ankle and hard cast on R ankle and foot. )


   Neurologic/Psych:  CNs II-XII nml as tested, no motor/sensory deficits, alert

, oriented x 3


   Skin:  normal color, warm/dry





Hospital Course


85 yo M with septic shock likely 2/2 pyelonephritis.  Resuscitated in ICU, 

required norepinephrine for two days despite aggressive fluid resuscitation.  

Transferred to floor, antibiotics changed to Rocephin as culture results 

returned, then switched to Cipro.  Pt has prior ORIF from admission one month 

ago and is NWB; he continued to have significant weakness for several days.  

While in the ICU he was diagnosed with c-diff and was placed on vancomycin.  He 

continued to have diarrhea and notably had diarrhea prior to arrival to the 

hospital.  Weakness and diarrhea eventually improved after a couple of days and 

efforts to get him moving without weight-bearing on his R leg were undertaken 

multiple times daily.  He was still NWB on this leg per Ortho instruction when 

he left the hospital but was otherwise mentating at baseline, tolerating PO 

without issue and was asymptomatic having finished his antibiotic course for 

the urine infection.  As his stools were still soft without eric diarrhea he 

was sent out with 4 more days of vancomycin to complete the 14-day course.  He 

remained hemodynamically stable while on the floor and afebrile.  Physical exam 

the day of discharge was unremarkable and stable from prior days.  He was sent 

to [facility] in stable condition with close Ortho follow-up and recommended 

PCP follow-up within a week of discharge.


Total time spent on discharge = 60 minutes


This includes examination of the patient, discharge planning, medication 

reconciliation, and communication with other providers.





Discharge Instructions


 [place] [address]


 


 


 Discharge Medical


 


Patient Name: Eric Francis JR Unit Number: [id number]


YOB: 1931 Patient Status: Admitted Inpatient


Attending Doctor: Grace Ervin DO Account Number: [id number]


   


 DI: Medical v4 


Discharge Instructions


Date of Service


Nov 16, 2017.





Admission


Reason for Admission:  Septic Shock





Discharge


Discharge Diagnosis / Problem:  Septic shock 2/2 E coli UTI





Discharge Goals


Goal(s):  Prevent Disease Progression





Activity Recommendations


Activity Limitations:  per Instructions/Follow-up section





.





Instructions / Follow-Up


Instructions / Follow-Up


Please take all medications as instructed. 





Please note your coumadin dose has been changed.  You will need to have your 

INR checked within two days of arrival to [facility] and then as needed to 

adjust levels of coumadin and achieve a goal INR 2-3. 





Please continue vancomycin for another 4 days.





It is strongly recommended that you set up a follow-up appointment with your 

primary care provider within one week of discharge to ensure they are aware of 

all changes that occurred during this hospitalization. 





Please follow-up with your Orthopedic surgeon per their instructions post-

operatively. 





It was a pleasure taking care of you!  Call if you have any questions or 

problems.  You can reach a [facility] hospitalist on duty at [facility] 24 hours a day by calling [phone].





Take care of yourself.





DO [facility]





Current Hospital Diet


Patient's current hospital diet: AHA Diet (Heart Healthy)





Discharge Diet


Recommended Diet:  AHA Diet (Heart Healthy)





Pending Studies


Studies pending at discharge:  no





Laboratory Results





Lipid Panel








Test


  11/1/17


07:23 Range/Units


 


 


Triglycerides Level 81  0-150  mg/dl


 


Cholesterol Level 76  0-200  mg/dl


 


HDL Cholesterol 33   mg/dl


 


Cholesterol/HDL Ratio 2.3   


 


LDL Cholesterol, Calculated 27   mg/dl











Medical Emergencies








.


Who to Call and When:





Medical Emergencies:  If at any time you feel your situation is an emergency, 

please call 911 immediately.





.





Non-Emergent Contact


Non-Emergency issues call your:  Primary Care Provider





.


.








"Provider Documentation" section prepared by Grace Ervin.








.





VTE Core Measure


Inpt VTE Proph given/why not?:  Unfractionated heparin SQ, Warfarin (Coumadin)





Additional Copies To


Dilan Rucker MD

## 2017-11-17 ENCOUNTER — HOSPITAL ENCOUNTER (OUTPATIENT)
Dept: HOSPITAL 45 - C.RDSM | Age: 82
End: 2017-11-17
Attending: ORTHOPAEDIC SURGERY
Payer: COMMERCIAL

## 2017-11-17 DIAGNOSIS — Z96.7: Primary | ICD-10-CM
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
## 2017-11-17 DIAGNOSIS — S82.891A: ICD-10-CM

## 2017-11-27 ENCOUNTER — HOSPITAL ENCOUNTER (OUTPATIENT)
Dept: HOSPITAL 45 - C.LABCC | Age: 82
Discharge: NURSING, REHAB OR LONG-TERM CARE | End: 2017-11-27
Attending: INTERNAL MEDICINE
Payer: COMMERCIAL

## 2017-11-27 DIAGNOSIS — I48.91: ICD-10-CM

## 2017-11-27 DIAGNOSIS — N18.9: ICD-10-CM

## 2017-11-27 DIAGNOSIS — D64.9: Primary | ICD-10-CM

## 2017-11-27 LAB
ALBUMIN/GLOB SERPL: 0.6 {RATIO} (ref 0.9–2)
ALP SERPL-CCNC: 90 U/L (ref 45–117)
ALT SERPL-CCNC: 26 U/L (ref 12–78)
ANION GAP SERPL CALC-SCNC: 7 MMOL/L (ref 3–11)
AST SERPL-CCNC: 23 U/L (ref 15–37)
BASOPHILS # BLD: 0.03 K/UL (ref 0–0.2)
BASOPHILS NFR BLD: 0.5 %
BUN SERPL-MCNC: 19 MG/DL (ref 7–18)
BUN/CREAT SERPL: 14.5 (ref 10–20)
CALCIUM SERPL-MCNC: 8.2 MG/DL (ref 8.5–10.1)
CHLORIDE SERPL-SCNC: 102 MMOL/L (ref 98–107)
CO2 SERPL-SCNC: 28 MMOL/L (ref 21–32)
COMPLETE: YES
CREAT SERPL-MCNC: 1.3 MG/DL (ref 0.6–1.4)
EOSINOPHIL NFR BLD AUTO: 285 K/UL (ref 130–400)
GLOBULIN SER-MCNC: 3.3 GM/DL (ref 2.5–4)
GLUCOSE SERPL-MCNC: 78 MG/DL (ref 70–99)
HCT VFR BLD CALC: 31.3 % (ref 42–52)
IG%: 0.2 %
IMM GRANULOCYTES NFR BLD AUTO: 12.9 %
LYMPHOCYTES # BLD: 0.78 K/UL (ref 1.2–3.4)
MCH RBC QN AUTO: 27 PG (ref 25–34)
MCHC RBC AUTO-ENTMCNC: 30.4 G/DL (ref 32–36)
MCV RBC AUTO: 88.9 FL (ref 80–100)
MONOCYTES NFR BLD: 9.5 %
NEUTROPHILS # BLD AUTO: 4.5 %
NEUTROPHILS NFR BLD AUTO: 72.4 %
PMV BLD AUTO: 10.9 FL (ref 7.4–10.4)
POTASSIUM SERPL-SCNC: 4 MMOL/L (ref 3.5–5.1)
RBC # BLD AUTO: 3.52 M/UL (ref 4.7–6.1)
SODIUM SERPL-SCNC: 137 MMOL/L (ref 136–145)
WBC # BLD AUTO: 6.03 K/UL (ref 4.8–10.8)

## 2017-11-29 ENCOUNTER — HOSPITAL ENCOUNTER (OUTPATIENT)
Dept: HOSPITAL 45 - C.LABCC | Age: 82
Discharge: NURSING, REHAB OR LONG-TERM CARE | End: 2017-11-29
Attending: INTERNAL MEDICINE
Payer: COMMERCIAL

## 2017-11-29 DIAGNOSIS — D64.9: Primary | ICD-10-CM

## 2017-11-29 DIAGNOSIS — N39.0: ICD-10-CM

## 2017-11-29 LAB
APPEARANCE UR: CLEAR
BILIRUB UR-MCNC: (no result) MG/DL
COLOR UR: YELLOW
MANUAL MICROSCOPIC REQUIRED?: NO
NITRITE UR QL STRIP: (no result)
PH UR STRIP: 5 [PH] (ref 4.5–7.5)
REVIEW REQ?: NO
SP GR UR STRIP: 1.02 (ref 1–1.03)
URINE BILL WITH OR WITHOUT MIC: (no result)
URINE EPITHELIAL CELL AUTO: (no result) /LPF (ref 0–5)
UROBILINOGEN UR-MCNC: (no result) MG/DL

## 2017-12-01 ENCOUNTER — HOSPITAL ENCOUNTER (OUTPATIENT)
Dept: HOSPITAL 45 - C.LABCC | Age: 82
Discharge: NURSING, REHAB OR LONG-TERM CARE | End: 2017-12-01
Attending: INTERNAL MEDICINE
Payer: COMMERCIAL

## 2017-12-01 DIAGNOSIS — D64.9: Primary | ICD-10-CM

## 2017-12-01 LAB
EOSINOPHIL NFR BLD AUTO: 253 K/UL (ref 130–400)
FERRITIN SERPL-MCNC: 312.4 NG/ML (ref 8–388)
HCT VFR BLD CALC: 32.2 % (ref 42–52)
MCH RBC QN AUTO: 26.6 PG (ref 25–34)
MCHC RBC AUTO-ENTMCNC: 30.4 G/DL (ref 32–36)
MCV RBC AUTO: 87.5 FL (ref 80–100)
PMV BLD AUTO: 11.2 FL (ref 7.4–10.4)
RBC # BLD AUTO: 3.68 M/UL (ref 4.7–6.1)
TIBC SERPL-MCNC: 242 MCG/DL (ref 250–450)
WBC # BLD AUTO: 5.6 K/UL (ref 4.8–10.8)

## 2018-01-18 ENCOUNTER — HOSPITAL ENCOUNTER (OUTPATIENT)
Dept: HOSPITAL 45 - C.PATHSPEC | Age: 83
Discharge: HOME | End: 2018-01-18
Attending: PHYSICIAN ASSISTANT
Payer: COMMERCIAL

## 2018-01-18 DIAGNOSIS — C44.319: ICD-10-CM

## 2018-01-18 DIAGNOSIS — L57.0: Primary | ICD-10-CM

## 2022-07-21 NOTE — PROGRESS NOTE
Medicine Progress Note


Date & Time of Visit:


Nov 15, 2017 at 19:20


.


Subjective





No fever.


No chest pain or palpitations.


No cough or SOB.


No nausea or vomiting.


Diarrhea improved.


Ongoing urinary frequency.


.





Objective





Last 8 Hrs








  Date Time  Temp Pulse Resp B/P (MAP) Pulse Ox O2 Delivery O2 Flow Rate FiO2


 


11/15/17 16:00     97 Room Air  


 


11/15/17 15:32 36.6 53 18 99/63 (75) 97 Room Air  








Physical Exam:





General- lying in bed, no distress


Neck- no JVD


Lungs- clear


Heart- RRR, no gallop


Abdomen- + BS, soft, nontender 


- Johnson cath draining clear urine


Extremities- 1+ pretibial edema; right ankle casted; capillary refill right 

toes < 2 sec; brace applied to left ankle 


Neuro- alert


.


Laboratory Results:





Last 24 Hours








Test


  11/15/17


07:47


 


Prothrombin Time 27.0 SECONDS 


 


Prothromb Time International


Ratio 2.4 


 











Assessment & Plan





SEPTIC SHOCK (present on admission)


Presented with dysuria and diarrhea


Initial BP 81/55.


No fever.


WBC 26,950.


Lactate 2.99.


Received IV fluids.


Blood and urine cultures obtained.


Started on broad spectrum antibiotic therapy with piperacillin / tazo + 

vancomycin.


Persistent hypotension after fluid bolus.


Received norepinephrine for pressor support.


Admitted to ICU.


Lactate and hemodynamics improved.


Blood cultures negative.


Management of UTI and C diff as discussed separately below.


Transferred out of ICU


Transitioned to oral therapy with ciprofloxacin; continue for 14 day course in 

light of possible prostatitis.





URINARY TRACT INFECTION (present on admission)


Had dysuria at time of admission.


UA showed WBC's and bacteria.


Urine culture grew E coli; blood cultures negative.


No evidence of urinary tract obstruction per US and CT.


Transitioned to oral therapy with ciprofloxacin; continue for 14 day course in 

light of possible prostatitis.





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


Patient had loose stool at NH prior to admission and within 24 hrs of admission.


Stool 11/6 +  for C diff.


Technically may be counted as hospital acquired infection since PCR wasn't done 

until 11/6, but clinically suspect that C diff was present on admission.


Unclear whether UTI or C diff was cause of sepsis.


Receiving vancomycin with improvement.


Requires prolonged course of antibiotic therapy for UTI.


Will continue vancomycin for C diff until ciprofloxacin finished and then for 7 

additional days to minimize risk of recurrence.





PAROXYSMAL ATRIAL FIBRILLATION / SVT


History of PAF and SVT.


Continue amiodarone, warfarin.





FLUID OVERLOAD


Due to aggressive fluid resuscitation for septic shock.


Echo 2016 showed normal LVEF; no need to repeat at this time.


Chest x-ray 11/13 showed mild pulmonary vascular congestion, improving.


Received diuretics with improvement; will change furosemide to PRN.





CKD / NEGAR


CKD III with baseline creatinine 1.2-1.4 December 2016.


Serum creatinine 1.87 on admission and abdoul to 1.93.


Probable NEGAR secondary to sepsis.


Serum creatinine yesterday = 1.38.


Follow.





HYPOKALEMIA


Potassium as low as 3.0.


Replaced.


K yesterday = 4.1.


Follow.





HYPOPHOSPHATEMIA


Phosphorus as low as 3.1.2


Replaced.


Phos 11/10 was 2.5.


Follow.





URINARY FREQUENCY


Difficult insertion of Johnson catheter in ED at time of presentation; patient 

hopes to avoid further catheterizations.  


Reported allergy to finasteride.


Continue tamsulosin for BPH.


Continue antibiotics for possible prostatitis.


Postvoid residual by bedside ultrasound 150 mls 11/12.


Best to avoid urinary catheters if possible.





RIGHT ANKLE FRACTURE


Management per Ortho.





VTE PROPHYLAXIS


SQ heparin / warfarin.





DISPOSITION


Expected return to [facility] for skilled care.


Internal Medicine follow-up with Dr. Rucker.


Cardiology follow-up with Dr. Hatch.


Urology follow-up with Dr. Dinero.


.


Consultants:


Critical Care Medicine


.


Current Inpatient Medications:





Current Inpatient Medications








 Medications


  (Trade)  Dose


 Ordered  Sig/Deanne


 Route  Start Time


 Stop Time Status Last Admin


Dose Admin


 


 Acetaminophen


  (Tylenol Tab)  650 mg  Q4H  PRN


 PO  11/4/17 14:30


 12/4/17 14:29   


 


 


 Pantoprazole


 Sodium


  (Protonix Tab)  40 mg  DAILY


 PO  11/5/17 09:00


 12/5/17 08:59  11/15/17 08:04


40 MG


 


 Amiodarone HCl


  (Cordarone Tab)  200 mg  DAILY


 PO  11/5/17 09:00


 12/5/17 08:59  11/15/17 08:04


200 MG


 


 Aspirin


  (Ecotrin Tab)  81 mg  DAILY


 PO  11/5/17 09:00


 12/5/17 08:59  11/15/17 08:04


81 MG


 


 Atorvastatin


 Calcium


  (Lipitor Tab)  10 mg  DAILY


 PO  11/5/17 09:00


 12/5/17 08:59  11/15/17 08:04


10 MG


 


 Acetaminophen/


 Hydrocodone Bitart


  (Norco 5/325 Tab)  1 tab  Q6H  PRN


 PO  11/4/17 14:45


 11/18/17 14:44   


 


 


 Levothyroxine


 Sodium


  (Synthroid Tab)  100 mcg  DAILYBB


 PO  11/5/17 06:00


 12/5/17 06:59  11/15/17 06:02


100 MCG


 


 Polyethylene


  (Miralax Powder


 Packet)  17 gm  DAILY  PRN


 PO  11/4/17 14:45


 12/4/17 14:44   


 


 


 Tamsulosin HCl


  (Flomax Cap)  0.4 mg  HS


 PO  11/4/17 21:00


 12/4/17 20:59  11/15/17 20:28


0.4 MG


 


 Vancomycin HCl


  (Vancomycin Oral


 Soln)  125 mg  QID


 PO  11/6/17 13:00


 11/20/17 12:59  11/15/17 20:28


125 MG


 


 Raspberry


  (Raspberry Syrup


 5ml Cup)  5 ml  QID


 PO  11/6/17 13:00


 11/20/17 12:59  11/15/17 20:28


5 ML


 


 Warfarin Sodium


  (Coumadin Tab)  2 mg  DAILY@16


 PO  11/7/17 16:00


 12/7/17 15:59  11/15/17 15:40


2 MG


 


 Albuterol/


 Ipratropium


  (Duoneb)  3 ml  Q4H  PRN


 INH  11/9/17 06:00


 12/9/17 05:59   


 


 


 Nystatin


  (Mycostatin Susp)  5 ml  QID


 PO  11/9/17 17:00


 11/19/17 16:59  11/15/17 20:28


5 ML


 


 Furosemide 20 mg/


 Syringe  2 ml @ 4


 mls/min  TODAY@0900


 IV  11/13/17 09:00


 12/13/17 08:59  11/15/17 08:15


4 MLS/MIN


 


 Miconazole Nitrate


  (Desenex Powder)  1 appln  PRN  PRN


 EXT  11/14/17 01:45


 12/14/17 01:44 POD # 1